# Patient Record
Sex: MALE | Race: WHITE | NOT HISPANIC OR LATINO | Employment: FULL TIME | URBAN - NONMETROPOLITAN AREA
[De-identification: names, ages, dates, MRNs, and addresses within clinical notes are randomized per-mention and may not be internally consistent; named-entity substitution may affect disease eponyms.]

---

## 2017-01-09 ENCOUNTER — OFFICE VISIT (OUTPATIENT)
Dept: CHIROPRACTIC MEDICINE | Facility: OTHER | Age: 42
End: 2017-01-09
Attending: NURSE PRACTITIONER
Payer: COMMERCIAL

## 2017-01-09 DIAGNOSIS — M99.03 SEGMENTAL AND SOMATIC DYSFUNCTION OF LUMBAR REGION: Primary | ICD-10-CM

## 2017-01-09 DIAGNOSIS — M99.02 SEGMENTAL AND SOMATIC DYSFUNCTION OF THORACIC REGION: ICD-10-CM

## 2017-01-09 DIAGNOSIS — M54.50 ACUTE BILATERAL LOW BACK PAIN WITHOUT SCIATICA: ICD-10-CM

## 2017-01-09 DIAGNOSIS — M99.01 SEGMENTAL AND SOMATIC DYSFUNCTION OF CERVICAL REGION: ICD-10-CM

## 2017-01-09 PROCEDURE — 98941 CHIROPRACT MANJ 3-4 REGIONS: CPT | Performed by: CHIROPRACTOR

## 2017-01-11 NOTE — PROGRESS NOTES
Subjective Finding:    Chief compalint: Patient presents with:  Back Pain  Neck Pain  , Pain Scale: 5/10, Intensity: dull and ache, Duration: 2 weeks, Radiating: no.    Date of injury:     Activities that the pain restricts:   Home/household/hobbies/social activities: yes.  Work duties: no.  Sleep: no.  Makes symptoms better: rest.  Makes symptoms worse: activity and lumbar flexion.  Have you seen anyone else for the symptoms? No.  Work related: no.  Automobile related injury: no.    Objective and Assessment:    Posture Analysis:   High shoulder: .  Head tilt: .  High iliac crest: .  Head carriage: neutral.  Thoracic Kyphosis: neutral.  Lumbar Lordosis: forward.    Lumbar Range of Motion: extension decreased.  Cervical Range of Motion: .  Thoracic Range of Motion: .  Extremity Range of Motion: .    Palpation:   Quad lumb: bilateral, referred pain: no    Segmental dysfunction pre-treatment and treatment area: C3, T6, T7, L3, L4 and L5.    Assessment post-treatment:  Cervical: .  Thoracic: ROM increased.  Lumbar: ROM increased.    Comments: .      Complicating Factors: .    Plan / Procedure:    Treatment plan: PRN.  Instructed patient: stretch as instructed at visit.  Short term goals: reduce pain and increase ROM.  Long term goals: restore normal function.  Prognosis: excellent.

## 2017-01-16 ENCOUNTER — APPOINTMENT (OUTPATIENT)
Dept: OCCUPATIONAL MEDICINE | Facility: OTHER | Age: 42
End: 2017-01-16

## 2017-03-03 DIAGNOSIS — M54.2 NECK PAIN: ICD-10-CM

## 2017-03-06 RX ORDER — HYDROCODONE BITARTRATE AND ACETAMINOPHEN 5; 325 MG/1; MG/1
TABLET ORAL
Qty: 60 TABLET | Refills: 0 | Status: SHIPPED | OUTPATIENT
Start: 2017-03-06 | End: 2017-07-31

## 2017-03-07 DIAGNOSIS — M54.2 NECK PAIN: ICD-10-CM

## 2017-03-07 NOTE — TELEPHONE ENCOUNTER
Patient would like Rx sent via  to the [facility] then walked to [facility]

## 2017-03-08 RX ORDER — HYDROCODONE BITARTRATE AND ACETAMINOPHEN 5; 325 MG/1; MG/1
TABLET ORAL
OUTPATIENT
Start: 2017-03-08

## 2017-04-07 ENCOUNTER — HOSPITAL ENCOUNTER (EMERGENCY)
Facility: HOSPITAL | Age: 42
Discharge: HOME | End: 2017-04-07
Attending: NURSE PRACTITIONER | Admitting: NURSE PRACTITIONER
Payer: COMMERCIAL

## 2017-04-07 VITALS
TEMPERATURE: 96.6 F | DIASTOLIC BLOOD PRESSURE: 80 MMHG | SYSTOLIC BLOOD PRESSURE: 131 MMHG | RESPIRATION RATE: 16 BRPM | OXYGEN SATURATION: 99 %

## 2017-04-07 DIAGNOSIS — J22 LOWER RESPIRATORY INFECTION (E.G., BRONCHITIS, PNEUMONIA, PNEUMONITIS, PULMONITIS): ICD-10-CM

## 2017-04-07 PROCEDURE — 99213 OFFICE O/P EST LOW 20 MIN: CPT | Mod: 25

## 2017-04-07 PROCEDURE — 99213 OFFICE O/P EST LOW 20 MIN: CPT | Performed by: NURSE PRACTITIONER

## 2017-04-07 PROCEDURE — 71020 ZZHC CHEST TWO VIEWS, FRONT/LAT: CPT | Mod: TC

## 2017-04-07 RX ORDER — PREDNISONE 20 MG/1
TABLET ORAL
Qty: 10 TABLET | Refills: 0 | Status: SHIPPED | OUTPATIENT
Start: 2017-04-07 | End: 2017-05-17

## 2017-04-07 RX ORDER — ALBUTEROL SULFATE 90 UG/1
2 AEROSOL, METERED RESPIRATORY (INHALATION) EVERY 4 HOURS PRN
Qty: 1 INHALER | Refills: 0 | Status: SHIPPED | OUTPATIENT
Start: 2017-04-07 | End: 2018-11-20

## 2017-04-07 RX ORDER — CODEINE PHOSPHATE AND GUAIFENESIN 10; 100 MG/5ML; MG/5ML
1-2 SOLUTION ORAL EVERY 4 HOURS PRN
Qty: 180 ML | Refills: 0 | Status: SHIPPED | OUTPATIENT
Start: 2017-04-07 | End: 2017-05-17

## 2017-04-07 RX ORDER — AZITHROMYCIN 250 MG/1
TABLET, FILM COATED ORAL
Qty: 6 TABLET | Refills: 0 | Status: SHIPPED | OUTPATIENT
Start: 2017-04-07 | End: 2017-04-12

## 2017-04-07 RX ORDER — BENZONATATE 200 MG/1
200 CAPSULE ORAL 3 TIMES DAILY PRN
Qty: 40 CAPSULE | Refills: 0 | Status: SHIPPED | OUTPATIENT
Start: 2017-04-07 | End: 2017-05-17

## 2017-04-07 ASSESSMENT — ENCOUNTER SYMPTOMS
CHILLS: 1
COUGH: 1
WHEEZING: 0
NAUSEA: 0
SINUS PRESSURE: 1
VOMITING: 0
APPETITE CHANGE: 0
FEVER: 1
FATIGUE: 1
DIARRHEA: 0
ABDOMINAL PAIN: 0
SHORTNESS OF BREATH: 1
SORE THROAT: 1
ARTHRALGIAS: 1
MYALGIAS: 1

## 2017-04-07 NOTE — ED PROVIDER NOTES
History     Chief Complaint   Patient presents with     URI     c/o cough and cold symptoms x 2 weeks     HPI  Jese Joe is a 42 year old male who presents today with a CC of cough, sore throat from coughing, mild shortness of breath, chills, sweats x 2+ weeks.  He notes that it started as a head cold and it has moved down to a dry hacking cough in his chest.  He has been using OTC cough medicine, elder berry, fisherman's friends.  He has been pushing fluids.  He was exposed to his family who was ill with the same symptoms.  His children were just treated with Azithromycin with improvement.      I have reviewed the Medications, Allergies, Past Medical and Surgical History, and Social History in the Epic system.    Review of Systems   Constitutional: Positive for chills, fatigue and fever. Negative for appetite change.   HENT: Positive for congestion, sinus pressure and sore throat. Negative for ear pain.    Respiratory: Positive for cough and shortness of breath. Negative for wheezing.    Gastrointestinal: Negative for abdominal pain, diarrhea, nausea and vomiting.   Musculoskeletal: Positive for arthralgias and myalgias.       Physical Exam   BP: 131/80  Heart Rate: 70  Temp: 96.6  F (35.9  C)  Resp: 16  SpO2: 99 %    Physical Exam   Constitutional: He is oriented to person, place, and time. He appears well-developed and well-nourished. He is cooperative.   HENT:   Head: Normocephalic and atraumatic.   Right Ear: Tympanic membrane, external ear and ear canal normal.   Left Ear: Tympanic membrane, external ear and ear canal normal.   Mouth/Throat: Uvula is midline. Posterior oropharyngeal edema and posterior oropharyngeal erythema present.   Eyes: Conjunctivae are normal.   Neck: Normal range of motion. Neck supple.   Cardiovascular: Normal rate and regular rhythm.    Pulmonary/Chest: Effort normal. He has wheezes.   Frequent harsh cough noted in exam room   Musculoskeletal: Normal range of motion.    Neurological: He is alert and oriented to person, place, and time.   Skin: Skin is warm and dry.   Psychiatric: He has a normal mood and affect. His behavior is normal.   Nursing note and vitals reviewed.      ED Course     ED Course     Procedures      Assessments & Plan (with Medical Decision Making)     I have reviewed the nursing notes.    I have reviewed the findings, diagnosis, plan and need for follow up with the patient.  ASSESSMENT / PLAN:  (J22) Lower respiratory infection (e.g., bronchitis, pneumonia, pneumonitis, pulmonitis)  Comment: symptomatic x 2+ weeks, symptoms continue to worsen  Plan:  Patient verbally educated and given appropriate education sheets for each of their diagnoses and has no questions.   Symptomatic treatments such as those listed below are recommended as needed:   Take OTC motrin or tylenol as directed on the bottle as needed.   Cool mist humidifier at bedside    May try safely elevating HOB or sleeping in recliner   Take OTC cold medicine as directed on bottle - check ingredients, many are multi symptom and may contain tylenol or motrin   Frequent sips of non-caffeine fluids, rest, wash hands often   Sinus rinses such as a netti pot may help with sinus symptoms   Return to ED/UC if symptoms worsen or concerns develop: shortness of breath,     chest pain, unable to control fever < 103 with medications, persistent vomiting, signs/symptoms of dehydration.   If symptoms persist follow up with PCP for re-evaluation.        Discharge Medication List as of 4/7/2017 12:33 PM      START taking these medications    Details   predniSONE (DELTASONE) 20 MG tablet Take two tablets (= 40mg) each day for 5 (five) days, Disp-10 tablet, R-0, E-Prescribe      guaiFENesin-codeine (ROBITUSSIN AC) 100-10 MG/5ML SOLN solution Take 5-10 mLs by mouth every 4 hours as needed for cough, Disp-180 mL, R-0, Local Print      azithromycin (ZITHROMAX Z-DENISE) 250 MG tablet Two tablets on the first day, then one  tablet daily for the next 4 days, Disp-6 tablet, R-0, E-Prescribe      albuterol (PROAIR HFA/PROVENTIL HFA/VENTOLIN HFA) 108 (90 BASE) MCG/ACT Inhaler Inhale 2 puffs into the lungs every 4 hours as needed for shortness of breath / dyspnea or wheezing, Disp-1 Inhaler, R-0, E-Prescribe      benzonatate (TESSALON) 200 MG capsule Take 1 capsule (200 mg) by mouth 3 times daily as needed for cough, Disp-40 capsule, R-0, E-Prescribe             Final diagnoses:   Lower respiratory infection (e.g., bronchitis, pneumonia, pneumonitis, pulmonitis)       4/7/2017   [facility]     Val Mcgee NP  04/09/17 3238

## 2017-04-07 NOTE — ED AVS SNAPSHOT
[facility]    [address] [address]    Phone:  [phone]                                       Jese Joe   MRN: [id number]    Department:  [facility]   Date of Visit:  4/7/2017           Patient Information     Date Of Birth          1975        Your diagnoses for this visit were:     Lower respiratory infection (e.g., bronchitis, pneumonia, pneumonitis, pulmonitis)        You were seen by Val Mcgee NP.      Follow-up Information     Follow up with [facility].    Specialty:  EMERGENCY MEDICINE    Why:  As needed, If symptoms worsen, or concerns develop    Contact information:    [address] [address] [phone]    Additional information:    From [facility]: Take [address]. Turn left at [address]. Turn left at the first stoplight on [address]. At the first stop sign, take a right onto [address]. Take a left into the parking lot and continue through until you reach the North enterance of the building.       From [place]: Take [address]. Take the [address] ramp towards Nursery. Turn left onto [address]. Take a slight right onto [address]. Turn left at the first stoplight on [address]. At the first stop sign, take a right onto [address]. Take a left into the parking lot and continue through until you reach the North enterance of the building.       From [place]: Take [address]. Take a right at [address]. At the first stop sign, take a right onto [address]. Take a left into the parking lot and continue through until you reach the North enterance of the building.         Follow up with Andrei Philip MD.    Specialty:  Family Practice    Why:  As needed, if symptoms do not improve    Contact information:    JENNIE VÁZQUEZ [facility]  [address]  [facility] [address]  [phone]        Discharge  References/Attachments     BRONCHITIS, ACUTE (ENGLISH)         Review of your medicines      START taking        Dose / Directions Last dose taken    albuterol 108 (90 BASE) MCG/ACT Inhaler   Commonly known as:  PROAIR HFA/PROVENTIL HFA/VENTOLIN HFA   Dose:  2 puff   Quantity:  1 Inhaler        Inhale 2 puffs into the lungs every 4 hours as needed for shortness of breath / dyspnea or wheezing   Refills:  0        azithromycin 250 MG tablet   Commonly known as:  ZITHROMAX Z-DENISE   Quantity:  6 tablet        Two tablets on the first day, then one tablet daily for the next 4 days   Refills:  0        benzonatate 200 MG capsule   Commonly known as:  TESSALON   Dose:  200 mg   Quantity:  40 capsule        Take 1 capsule (200 mg) by mouth 3 times daily as needed for cough   Refills:  0        guaiFENesin-codeine 100-10 MG/5ML Soln solution   Commonly known as:  ROBITUSSIN AC   Dose:  1-2 tsp.   Quantity:  180 mL        Take 5-10 mLs by mouth every 4 hours as needed for cough   Refills:  0        predniSONE 20 MG tablet   Commonly known as:  DELTASONE   Quantity:  10 tablet        Take two tablets (= 40mg) each day for 5 (five) days   Refills:  0          Our records show that you are taking the medicines listed below. If these are incorrect, please call your family doctor or clinic.        Dose / Directions Last dose taken    HYDROcodone-acetaminophen 5-325 MG per tablet   Commonly known as:  NORCO   Quantity:  60 tablet        TAKE 1 TABLET BY MOUTH EVERY 4-6 HOURS AS NEEDED FOR PAIN   Refills:  0        IBUPROFEN PO   Dose:  800 mg        Take 800 mg by mouth   Refills:  0        ondansetron 4 MG ODT tab   Commonly known as:  ZOFRAN-ODT   Quantity:  20 tablet        DISSOLVE 1 TABLET IN MOUTH EVERY 6 HOURS AS NEEDED FOR NAUSEA   Refills:  0        TYLENOL PO   Dose:  1000 mg        Take 1,000 mg by mouth   Refills:  0                Prescriptions were sent or printed at these locations (5 Prescriptions)                  "  [id number] - [place], [place] - [address] AT [facility] [address]   [address], [place] [address]    Telephone:  [phone]   Fax:  [phone]   Hours:                  E-Prescribed (4 of 5)         predniSONE (DELTASONE) 20 MG tablet               azithromycin (ZITHROMAX Z-DENISE) 250 MG tablet               albuterol (PROAIR HFA/PROVENTIL HFA/VENTOLIN HFA) 108 (90 BASE) MCG/ACT Inhaler               benzonatate (TESSALON) 200 MG capsule                 Printed at Department/Unit printer (1 of 5)         guaiFENesin-codeine (ROBITUSSIN AC) 100-10 MG/5ML SOLN solution                Procedures and tests performed during your visit     Chest XR,  PA & LAT      Orders Needing Specimen Collection     None      Pending Results     Date and Time Order Name Status Description    2017 1158 Chest XR,  PA & LAT In process             Pending Culture Results     No orders found from 2017 to 2017.            Thank you for choosing [facility]       Thank you for choosing [facility] for your care. Our goal is always to provide you with excellent care. Hearing back from our patients is one way we can continue to improve our services. Please take a few minutes to complete the written survey that you may receive in the mail after you visit with us. Thank you!        [employer] Information     [employer] lets you send messages to your doctor, view your test results, renew your prescriptions, schedule appointments and more. To sign up, go to [email or web address] . Click on \"Log in\" on the left side of the screen, which will take you to the Welcome page. Then click on \"Sign up Now\" on the right side of the page.     You will be asked to enter the access code listed below, as well as some personal information. Please follow the directions to create your username and password.     Your access code is: [email or web address]  Expires: 2017 12:31 PM     Your access code will  in 90 days. If you need help " or a new code, please call your [facility] or [phone].        Care EveryWhere ID     This is your Care EveryWhere ID. This could be used by other organizations to access your [facility] medical records  [id number]        After Visit Summary       This is your record. Keep this with you and show to your community pharmacist(s) and doctor(s) at your next visit.

## 2017-04-07 NOTE — ED AVS SNAPSHOT
[facility]    [address] [address]    Phone:  [phone]                                       Jese Joe   MRN: [id number]    Department:  [facility]   Date of Visit:  4/7/2017           After Visit Summary Signature Page     I have received my discharge instructions, and my questions have been answered. I have discussed any challenges I see with this plan with the nurse or doctor.    ..........................................................................................................................................  Patient/Patient Representative Signature      ..........................................................................................................................................  Patient Representative Print Name and Relationship to Patient    ..................................................               ................................................  Date                                            Time    ..........................................................................................................................................  Reviewed by Signature/Title    ...................................................              ..............................................  Date                                                            Time

## 2017-04-07 NOTE — ED NOTES
Patient presents with complaints of URI; cough/cold symptoms x 2 weeks.  States now it feels like it is in his chest; voice is hoarse, cough noted; mask given.

## 2017-04-07 NOTE — ED NOTES
Pt presents today alone for c/o a cough and congestion for about 2 weeks that keeps getting worse and says he feels it's moved it his chest.

## 2017-05-17 ENCOUNTER — HOSPITAL ENCOUNTER (EMERGENCY)
Facility: HOSPITAL | Age: 42
Discharge: HOME | End: 2017-05-17
Attending: NURSE PRACTITIONER | Admitting: NURSE PRACTITIONER
Payer: COMMERCIAL

## 2017-05-17 VITALS
RESPIRATION RATE: 18 BRPM | DIASTOLIC BLOOD PRESSURE: 94 MMHG | TEMPERATURE: 96.4 F | SYSTOLIC BLOOD PRESSURE: 139 MMHG | OXYGEN SATURATION: 98 %

## 2017-05-17 DIAGNOSIS — S61.412A LACERATION OF HAND, LEFT, INITIAL ENCOUNTER: ICD-10-CM

## 2017-05-17 PROCEDURE — 12001 RPR S/N/AX/GEN/TRNK 2.5CM/<: CPT | Performed by: NURSE PRACTITIONER

## 2017-05-17 PROCEDURE — 90715 TDAP VACCINE 7 YRS/> IM: CPT | Performed by: PHYSICIAN ASSISTANT

## 2017-05-17 PROCEDURE — 12001 RPR S/N/AX/GEN/TRNK 2.5CM/<: CPT

## 2017-05-17 PROCEDURE — 40000268 ZZH STATISTIC NO CHARGES

## 2017-05-17 PROCEDURE — 25000125 ZZHC RX 250: Performed by: PHYSICIAN ASSISTANT

## 2017-05-17 PROCEDURE — 90471 IMMUNIZATION ADMIN: CPT

## 2017-05-17 RX ORDER — CEPHALEXIN 500 MG/1
500 CAPSULE ORAL 4 TIMES DAILY
Qty: 28 CAPSULE | Refills: 0 | Status: SHIPPED | OUTPATIENT
Start: 2017-05-17 | End: 2017-05-24

## 2017-05-17 RX ADMIN — CLOSTRIDIUM TETANI TOXOID ANTIGEN (FORMALDEHYDE INACTIVATED), CORYNEBACTERIUM DIPHTHERIAE TOXOID ANTIGEN (FORMALDEHYDE INACTIVATED), BORDETELLA PERTUSSIS TOXOID ANTIGEN (GLUTARALDEHYDE INACTIVATED), BORDETELLA PERTUSSIS FILAMENTOUS HEMAGGLUTININ ANTIGEN (FORMALDEHYDE INACTIVATED), BORDETELLA PERTUSSIS PERTACTIN ANTIGEN, AND BORDETELLA PERTUSSIS FIMBRIAE 2/3 ANTIGEN 0.5 ML: 5; 2; 2.5; 5; 3; 5 INJECTION, SUSPENSION INTRAMUSCULAR at 11:38

## 2017-05-17 ASSESSMENT — ENCOUNTER SYMPTOMS
CONSTITUTIONAL NEGATIVE: 1
WOUND: 1

## 2017-05-17 NOTE — ED PROVIDER NOTES
History     Chief Complaint   Patient presents with     Laceration     lt hand lac     Patient is a 42 year old male presenting with skin laceration. The history is provided by the patient. No  was used.   Laceration   Location:  Hand  Hand laceration location:  L hand  Depth:  Through dermis  Quality: straight    Bleeding: controlled    Injury mechanism: Elastic cord.  Pain details:     Severity:  Mild  Foreign body present:  No foreign bodies  Tetanus status:  Out of date    I have reviewed the Medications, Allergies, Past Medical and Surgical History, and Social History in the Epic system.    Review of Systems   Constitutional: Negative.    Musculoskeletal:        AFROM left hand and fingers   Skin: Positive for wound.       Physical Exam   BP: 139/94  Heart Rate: 69  Temp: 96.4  F (35.8  C)  Resp: 18  SpO2: 98 %  Physical Exam   Constitutional: He is oriented to person, place, and time. He appears well-developed and well-nourished. No distress.   HENT:   Head: Normocephalic and atraumatic.   Eyes: Conjunctivae are normal. No scleral icterus.   Neck: Normal range of motion.   Cardiovascular: Normal rate.    Pulmonary/Chest: Effort normal.   Musculoskeletal:        Left hand: He exhibits tenderness and laceration. He exhibits no swelling. Normal sensation noted. Normal strength noted.        Hands:  Neurological: He is alert and oriented to person, place, and time.   Skin: He is not diaphoretic.   Psychiatric: He has a normal mood and affect. His behavior is normal.   Nursing note and vitals reviewed.      ED Course     Medications   Tdap (tetanus-diphtheria-acell pertussis) (ADACEL) injection 0.5 mL (0.5 mLs Intramuscular Given 5/17/17 1138)     ED Course     Laceration repair  Date/Time: 5/17/2017 11:40 AM  Performed by: FEI ALCAZAR  Authorized by: FEI ALCAZAR   Consent: Verbal consent obtained.  Risks and benefits: risks, benefits and alternatives were  discussed  Consent given by: patient  Patient understanding: patient states understanding of the procedure being performed  Patient identity confirmed: verbally with patient and arm band  Body area: upper extremity  Location details: left hand  Laceration length: 2.5 cm  Foreign bodies: no foreign bodies  Tendon involvement: none  Nerve involvement: none  Anesthesia: local infiltration    Anesthesia:  Anesthesia: local infiltration  Local Anesthetic: lidocaine 1% with epinephrine   Anesthetic total: 3 mL  Sedation:  Patient sedated: no    Preparation: Patient was prepped and draped in the usual sterile fashion.  Skin closure: 4-0 nylon  Technique: simple  Approximation: close  Approximation difficulty: simple  Dressing: large band-aid, secured with coban.  Patient tolerance: Patient tolerated the procedure well with no immediate complications        Assessments & Plan (with Medical Decision Making)     I have reviewed the nursing notes.  I have reviewed the findings, diagnosis, plan and need for follow up with the patient.  Wound care instructions given.   Plan: Keep clean and dry x 24 hours, then may shower as usual. Do not soak or swim  Take Tylenol per package directions for pain  Call tomorrow to make a follow up appointment for suture removal in 7 days  Watch for signs and symptoms of infection: Increasing redness, pain, warmth, fever and follow up here or with PCP if any arise  Due to location on hand, given written script for Keflex to start abx if redness, swelling or drainage develop.     Discharge Medication List as of 5/17/2017 12:03 PM      START taking these medications    Details   cephALEXin (KEFLEX) 500 MG capsule Take 1 capsule (500 mg) by mouth 4 times daily for 7 days, Disp-28 capsule, R-0, Local Print             Final diagnoses:   Laceration of hand, left, initial encounter       5/17/2017   [facility]     Ryanne Philip NP  05/17/17 2988

## 2017-05-17 NOTE — ED AVS SNAPSHOT
[facility]    [address] [address]    Phone:  [phone]                                       Jese Joe   MRN: [id number]    Department[facility]  [facility]   Date of Visit:  5/17/2017           After Visit Summary Signature Page     I have received my discharge instructions, and my questions have been answered. I have discussed any challenges I see with this plan with the nurse or doctor.    ..........................................................................................................................................  Patient/Patient Representative Signature      ..........................................................................................................................................  Patient Representative Print Name and Relationship to Patient    ..................................................               ................................................  Date                                            Time    ..........................................................................................................................................  Reviewed by Signature/Title    ...................................................              ..............................................  Date                                                            Time

## 2017-05-17 NOTE — ED AVS SNAPSHOT
[facility]    [address]    [facility] [address]    Phone:  [phone]                                       Jese Joe   MRN: [id number]    Department:  [facility]   Date of Visit:  5/17/2017           Patient Information     Date Of Birth          1975        Your diagnoses for this visit were:     Laceration of hand, left, initial encounter        You were seen by Ryanne Philip NP.      Follow-up Information     Follow up with Andrei Philip MD In 1 week.    Specialty:  Family Practice    Why:   For suture removal    Contact information:     [facility]  [address] [facility] [address] [phone]          Follow up with [facility].    Specialty:  EMERGENCY MEDICINE    Why:  If symptoms worsen    Contact information:    [address]  [facility] [address] [phone]    Additional information:    From [place] Area: Take [address]. Turn left at [address]. Turn left at the first stoplight on [address]. At the first stop sign, take a right onto [address]. Take a left into the parking lot and continue through until you reach the North enterance of the building.       From [place]: Take [address]. Take the [address] ramp towards [place]. Turn left onto [address]. Take a slight right onto [address]. Turn left at the first stoplight on [address]. At the first stop sign, take a right onto [address]. Take a left into the parking lot and continue through until you reach the North enterance of the building.       From [place]: Take [address]. Take a right at [address]. At the first stop sign, take a right onto [address]. Take a left into the parking lot and continue through until you reach the North enterance of the building.         Discharge Instructions         Keep hand clean and dry. For 2-3 days, apply thin layer of  abx ointment around the laceration, and cover with a bandage.   Avoid heavy lifting and straining the incision.   You had 6 sutures placed today, they will need to come out in 7-10 days.   Monitor for infection, follow up if concerns develop.       Hand Laceration: All Closures  A laceration is a cut through the skin. You have a cut on the hand. Deep cuts usually require stitches (sutures) or staples. Minor cuts may be closed with surgical tape or skin adhesive.        Home care    Your healthcare provider may prescribe an antibiotic. This is to help prevent infection. Follow all instructions for taking this medicine. Take the medicine every day until it is gone or you are told to stop. You should not have any left over.    The healthcare provider may prescribe medicines for pain. Follow instructions for taking them.    Follow the healthcare provider s instructions on how to care for the cut.    Keep the wound clean and dry. Do not get the wound wet until you are told it is okay to do so. If the bandage gets wet, remove it. Gently pat the wound dry with a clean cloth. Then put on a clean, dry bandage..    To help prevent infection, wash your hands with soap and water before and after caring for the wound.     Caring for sutures or staples: Once you no longer need to keep them dry, clean the wound daily. First, remove the bandage. Then wash the area gently with soap and warm water, or as directed by the health care provider. Use a wet cotton swab to loosen and remove any blood or crust that forms. After cleaning, apply a thin layer of antibiotic ointment if advised. Then put on a new bandage unless you are told not to.    Caring for skin glue: Don t put apply liquid, ointment, or cream on the wound while the glue is in place. Avoid activities that cause heavy sweating. Protect the wound from sunlight. Do not scratch, rub, or pick at the adhesive film. Do not place tape directly over the film. The glue should peel off  within 5 to 10 days.     Caring for surgical tape: Keep the area dry. If it gets wet, blot it dry with a clean towel. Surgical tape usually falls off within 7 to 10 days. If it has not fallen off after 10 days, you can take it off yourself. Put mineral oil or petroleum jelly on a cotton ball and gently rub the tape until it is removed.    Once you can get the wound wet, you may shower as usual but do not soak the wound in water (no tub baths or swimming)    Even with proper treatment, a wound infection may sometimes occur. Check the wound daily for signs of infection listed below.  Follow-up care  Follow up with your healthcare provider as advised. If you have stitches or staples, be sure to return as directed to have them removed.  When to seek medical advice  Call your healthcare provider right away if any of these occur:    Wound bleeding not controlled by direct pressure    Signs of infection, including increasing pain in the wound, increasing wound redness or swelling, or pus or bad odor coming from the wound    Fever of 100.4 F (38. C) or as directed by your health care provider    Stitches or staples come apart or fall out or surgical tape falls off before 7 days    Wound edges re-open    Wound changes colors    Numbness or weakness in the affected hand     Decreased movement of the hand    2280-2747 [employer]. [address] [place] [address]. All rights reserved. This information is not intended as a substitute for professional medical care. Always follow your healthcare professional's instructions.          Future Appointments        Provider Department Dept Phone Center    5/18/2017 1:15 PM Andrei Philip MD [facility] [phone] [place] [facility]         Review of your medicines      START taking        Dose / Directions Last dose taken    cephALEXin 500 MG capsule   Commonly known as:  KEFLEX   Dose:  500 mg   Quantity:  28 capsule        Take 1 capsule (500 mg)  by mouth 4 times daily for 7 days   Refills:  0          Our records show that you are taking the medicines listed below. If these are incorrect, please call your family doctor or clinic.        Dose / Directions Last dose taken    albuterol 108 (90 BASE) MCG/ACT Inhaler   Commonly known as:  PROAIR HFA/PROVENTIL HFA/VENTOLIN HFA   Dose:  2 puff   Quantity:  1 Inhaler        Inhale 2 puffs into the lungs every 4 hours as needed for shortness of breath / dyspnea or wheezing   Refills:  0        HYDROcodone-acetaminophen 5-325 MG per tablet   Commonly known as:  NORCO   Quantity:  60 tablet        TAKE 1 TABLET BY MOUTH EVERY 4-6 HOURS AS NEEDED FOR PAIN   Refills:  0        IBUPROFEN PO   Dose:  800 mg        Take 800 mg by mouth   Refills:  0        ondansetron 4 MG ODT tab   Commonly known as:  ZOFRAN-ODT   Quantity:  20 tablet        DISSOLVE 1 TABLET IN MOUTH EVERY 6 HOURS AS NEEDED FOR NAUSEA   Refills:  0        TYLENOL PO   Dose:  1000 mg        Take 1,000 mg by mouth   Refills:  0                Prescriptions were sent or printed at these locations (1 Prescription)                   Other Prescriptions                Printed at Department/Unit printer (1 of 1)         cephALEXin (KEFLEX) 500 MG capsule                Orders Needing Specimen Collection     None      Pending Results     No orders found from 5/15/2017 to 5/18/2017.            Pending Culture Results     No orders found from 5/15/2017 to 5/18/2017.            Thank you for choosing [facility]       Thank you for choosing [facility] for your care. Our goal is always to provide you with excellent care. Hearing back from our patients is one way we can continue to improve our services. Please take a few minutes to complete the written survey that you may receive in the mail after you visit with us. Thank you!        [employer] Information     [employer] lets you send messages to your doctor, view your test results, renew your prescriptions, schedule  "appointments and more. To sign up, go to [email or web address] . Click on \"Log in\" on the left side of the screen, which will take you to the Welcome page. Then click on \"Sign up Now\" on the right side of the page.     You will be asked to enter the access code listed below, as well as some personal information. Please follow the directions to create your username and password.     Your access code is: [email or web address]  Expires: 2017 12:31 PM     Your access code will  in 90 days. If you need help or a new code, please call your [facility] clinic or [phone].        Care EveryWhere ID     This is your Care EveryWhere ID. This could be used by other organizations to access your [facility] medical records  [id number]        After Visit Summary       This is your record. Keep this with you and show to your community pharmacist(s) and doctor(s) at your next visit.                  "

## 2017-05-17 NOTE — DISCHARGE INSTRUCTIONS
Keep hand clean and dry. For 2-3 days, apply thin layer of abx ointment around the laceration, and cover with a bandage.   Avoid heavy lifting and straining the incision.   You had 6 sutures placed today, they will need to come out in 7-10 days.   Monitor for infection, follow up if concerns develop.       Hand Laceration: All Closures  A laceration is a cut through the skin. You have a cut on the hand. Deep cuts usually require stitches (sutures) or staples. Minor cuts may be closed with surgical tape or skin adhesive.        Home care    Your healthcare provider may prescribe an antibiotic. This is to help prevent infection. Follow all instructions for taking this medicine. Take the medicine every day until it is gone or you are told to stop. You should not have any left over.    The healthcare provider may prescribe medicines for pain. Follow instructions for taking them.    Follow the healthcare provider s instructions on how to care for the cut.    Keep the wound clean and dry. Do not get the wound wet until you are told it is okay to do so. If the bandage gets wet, remove it. Gently pat the wound dry with a clean cloth. Then put on a clean, dry bandage..    To help prevent infection, wash your hands with soap and water before and after caring for the wound.     Caring for sutures or staples: Once you no longer need to keep them dry, clean the wound daily. First, remove the bandage. Then wash the area gently with soap and warm water, or as directed by the health care provider. Use a wet cotton swab to loosen and remove any blood or crust that forms. After cleaning, apply a thin layer of antibiotic ointment if advised. Then put on a new bandage unless you are told not to.    Caring for skin glue: Don t put apply liquid, ointment, or cream on the wound while the glue is in place. Avoid activities that cause heavy sweating. Protect the wound from sunlight. Do not scratch, rub, or pick at the adhesive film. Do  not place tape directly over the film. The glue should peel off within 5 to 10 days.     Caring for surgical tape: Keep the area dry. If it gets wet, blot it dry with a clean towel. Surgical tape usually falls off within 7 to 10 days. If it has not fallen off after 10 days, you can take it off yourself. Put mineral oil or petroleum jelly on a cotton ball and gently rub the tape until it is removed.    Once you can get the wound wet, you may shower as usual but do not soak the wound in water (no tub baths or swimming)    Even with proper treatment, a wound infection may sometimes occur. Check the wound daily for signs of infection listed below.  Follow-up care  Follow up with your healthcare provider as advised. If you have stitches or staples, be sure to return as directed to have them removed.  When to seek medical advice  Call your healthcare provider right away if any of these occur:    Wound bleeding not controlled by direct pressure    Signs of infection, including increasing pain in the wound, increasing wound redness or swelling, or pus or bad odor coming from the wound    Fever of 100.4 F (38. C) or as directed by your health care provider    Stitches or staples come apart or fall out or surgical tape falls off before 7 days    Wound edges re-open    Wound changes colors    Numbness or weakness in the affected hand     Decreased movement of the hand    7636-4918 [employer]. [address] [place] [address]. All rights reserved. This information is not intended as a substitute for professional medical care. Always follow your healthcare professional's instructions.

## 2017-05-24 ENCOUNTER — HOSPITAL ENCOUNTER (EMERGENCY)
Facility: HOSPITAL | Age: 42
Discharge: HOME | End: 2017-05-24
Payer: COMMERCIAL

## 2017-05-24 VITALS
SYSTOLIC BLOOD PRESSURE: 135 MMHG | DIASTOLIC BLOOD PRESSURE: 79 MMHG | HEART RATE: 75 BPM | TEMPERATURE: 97.9 F | OXYGEN SATURATION: 98 % | RESPIRATION RATE: 16 BRPM

## 2017-05-24 DIAGNOSIS — T81.31XA WOUND DEHISCENCE, INITIAL ENCOUNTER: ICD-10-CM

## 2017-05-24 PROCEDURE — 99213 OFFICE O/P EST LOW 20 MIN: CPT

## 2017-05-24 PROCEDURE — 99212 OFFICE O/P EST SF 10 MIN: CPT

## 2017-05-24 NOTE — ED AVS SNAPSHOT
[facility]    [address] [address]    Phone:  [phone]                                       Jese Joe   MRN: [id number]    Department:  [facility]   Date of Visit:  5/24/2017           Patient Information     Date Of Birth          1975        Your diagnoses for this visit were:     Wound dehiscence, initial encounter        You were seen by Kellee Devries APRN FNP.      Follow-up Information     Follow up with Andrei Philip MD In 1 week.    Specialty:  Family Practice    Why:  if not improving or back to baseline    Contact information:    [facility]  [address] [place] [address] [phone]          Follow up with [facility].    Specialty:  EMERGENCY MEDICINE    Why:  As needed, If symptoms worsen    Contact information:    [address] [address] [phone]    Additional information:    From [facility]: Take [address]. Turn left at [address]. Turn left at the first stoplight on [address]. At the first stop sign, take a right onto [address]. Take a left into the parking lot and continue through until you reach the North enterance of the building.       From [place]: Take [address]. Take the [address] ramp towards [place]. Turn left onto [address]. Take a slight right onto [address]. Turn left at the first stoplight on [address]. At the first stop sign, take a right onto [address]. Take a left into the parking lot and continue through until you reach the North enterance of the building.       From [place]: Take [address]. Take a right at [address]. At the first stop sign, take a right onto [address]. Take a left into the parking lot and continue through until you reach the North enterance of the building.         Discharge Instructions       Keep wound clean and dry  Cover with  bacitracin oint and coban - change dressing daily  Thumb spica until wound has healed  Take out remaining stitches in 5 days  Return to  as needed         Review of your medicines      Our records show that you are taking the medicines listed below. If these are incorrect, please call your family doctor or clinic.        Dose / Directions Last dose taken    albuterol 108 (90 BASE) MCG/ACT Inhaler   Commonly known as:  PROAIR HFA/PROVENTIL HFA/VENTOLIN HFA   Dose:  2 puff   Quantity:  1 Inhaler        Inhale 2 puffs into the lungs every 4 hours as needed for shortness of breath / dyspnea or wheezing   Refills:  0        cephALEXin 500 MG capsule   Commonly known as:  KEFLEX   Dose:  500 mg   Quantity:  28 capsule        Take 1 capsule (500 mg) by mouth 4 times daily for 7 days   Refills:  0        HYDROcodone-acetaminophen 5-325 MG per tablet   Commonly known as:  NORCO   Quantity:  60 tablet        TAKE 1 TABLET BY MOUTH EVERY 4-6 HOURS AS NEEDED FOR PAIN   Refills:  0        IBUPROFEN PO   Dose:  800 mg        Take 800 mg by mouth   Refills:  0        ondansetron 4 MG ODT tab   Commonly known as:  ZOFRAN-ODT   Quantity:  20 tablet        DISSOLVE 1 TABLET IN MOUTH EVERY 6 HOURS AS NEEDED FOR NAUSEA   Refills:  0        TYLENOL PO   Dose:  1000 mg        Take 1,000 mg by mouth   Refills:  0                Orders Needing Specimen Collection     None      Pending Results     No orders found from 5/22/2017 to 5/25/2017.            Pending Culture Results     No orders found from 5/22/2017 to 5/25/2017.            Thank you for choosing [facility]       Thank you for choosing [facility] for your care. Our goal is always to provide you with excellent care. Hearing back from our patients is one way we can continue to improve our services. Please take a few minutes to complete the written survey that you may receive in the mail after you visit with us. Thank you!        [employer] Information     [employer] lets you send messages  "to your doctor, view your test results, renew your prescriptions, schedule appointments and more. To sign up, go to [email or web address] . Click on \"Log in\" on the left side of the screen, which will take you to the Welcome page. Then click on \"Sign up Now\" on the right side of the page.     You will be asked to enter the access code listed below, as well as some personal information. Please follow the directions to create your username and password.     Your access code is: [email or web address]  Expires: 2017 12:31 PM     Your access code will  in 90 days. If you need help or a new code, please call your [facility] clinic or [phone].        Care EveryWhere ID     This is your Care EveryWhere ID. This could be used by other organizations to access your [facility] medical records  [id number]        After Visit Summary       This is your record. Keep this with you and show to your community pharmacist(s) and doctor(s) at your next visit.                  "

## 2017-05-24 NOTE — ED AVS SNAPSHOT
[facility]    [address] [address]    Phone:  [phone]                                       Jese Joe   MRN: [id number]    Department:  [facility]   Date of Visit:  5/24/2017           After Visit Summary Signature Page     I have received my discharge instructions, and my questions have been answered. I have discussed any challenges I see with this plan with the nurse or doctor.    ..........................................................................................................................................  Patient/Patient Representative Signature      ..........................................................................................................................................  Patient Representative Print Name and Relationship to Patient    ..................................................               ................................................  Date                                            Time    ..........................................................................................................................................  Reviewed by Signature/Title    ...................................................              ..............................................  Date                                                            Time

## 2017-05-25 NOTE — ED PROVIDER NOTES
History     Chief Complaint   Patient presents with     Wound Dehiscence     Left hand after injury 1 weeks ago     HPI  Jese Joe is a 42 year old male who presents to  for evaluation of wound left hand. He is 1 week out from laceration injury, repair per interrupted sutures. Today, while at work, the wound opened in the middle aspect, 3 of the sutures pulled through. Denies pain, no drainage.     I have reviewed the Medications, Allergies, Past Medical and Surgical History, and Social History in the Epic system.    Review of Systems   Constitutional: Negative for fever.   HENT: Negative for congestion.    Eyes: Negative for redness.   Respiratory: Negative for shortness of breath.    Cardiovascular: Negative for chest pain.   Gastrointestinal: Negative for abdominal pain.   Genitourinary: Negative for difficulty urinating.   Musculoskeletal: Negative for arthralgias and neck stiffness.   Skin: Positive for wound. Negative for color change.        Left thumb web space laceration, wound dehiscence, see HPI   Neurological: Negative for headaches.   Psychiatric/Behavioral: Negative for confusion.       Physical Exam   BP: 135/79  Pulse: 75  Temp: 97.9  F (36.6  C)  Resp: 16  SpO2: 98 %  Physical Exam   Constitutional: He is oriented to person, place, and time. No distress.   HENT:   Head: Normocephalic and atraumatic.   Eyes: Conjunctivae are normal.   Cardiovascular: Normal rate and regular rhythm.    Pulmonary/Chest: Effort normal. No respiratory distress.   Musculoskeletal: Normal range of motion.   Neurological: He is alert and oriented to person, place, and time.   Skin: Skin is warm and dry. Laceration noted. He is not diaphoretic.   2 cm laceration left thumb web space, 1 cm mid wound dehiscence. 3 un functioning suture removed, skin is pink, good granulation, no drainage.    Nursing note and vitals reviewed.      ED Course       Procedures       Assessments & Plan (with Medical Decision Making)   Pt  is 1 week s/p laceration repair left thumb web space with wound dehiscence. No infection, wound care provided, dressed, and splint given for protection. Epic discharge instructions given. Pt discharged in stable condition.     I have reviewed the nursing notes.    I have reviewed the findings, diagnosis, plan and need for follow up with the patient.    Discharge Medication List as of 5/24/2017  9:16 PM          Final diagnoses:   Wound dehiscence, initial encounter     Keep wound clean and dry  Cover with bacitracin oint and coban - change dressing daily  Thumb spica until wound has healed  Take out remaining stitches in 5 days  Return to  as needed    5/24/2017   [facility]     Kellee Devries APRN [facility]  05/30/17 0924

## 2017-05-30 ASSESSMENT — ENCOUNTER SYMPTOMS
COLOR CHANGE: 0
NECK STIFFNESS: 0
ARTHRALGIAS: 0
SHORTNESS OF BREATH: 0
CONFUSION: 0
DIFFICULTY URINATING: 0
FEVER: 0
HEADACHES: 0
WOUND: 1
EYE REDNESS: 0
ABDOMINAL PAIN: 0

## 2017-07-23 ENCOUNTER — HOSPITAL ENCOUNTER (EMERGENCY)
Facility: HOSPITAL | Age: 42
Discharge: HOME | End: 2017-07-23
Attending: NURSE PRACTITIONER | Admitting: NURSE PRACTITIONER
Payer: COMMERCIAL

## 2017-07-23 VITALS
TEMPERATURE: 97.3 F | RESPIRATION RATE: 16 BRPM | DIASTOLIC BLOOD PRESSURE: 87 MMHG | SYSTOLIC BLOOD PRESSURE: 127 MMHG | OXYGEN SATURATION: 99 %

## 2017-07-23 DIAGNOSIS — S46.911A SHOULDER STRAIN, RIGHT, INITIAL ENCOUNTER: ICD-10-CM

## 2017-07-23 DIAGNOSIS — M62.838 SPASM OF MUSCLE: ICD-10-CM

## 2017-07-23 PROCEDURE — 99214 OFFICE O/P EST MOD 30 MIN: CPT | Mod: 25

## 2017-07-23 PROCEDURE — 96372 THER/PROPH/DIAG INJ SC/IM: CPT

## 2017-07-23 PROCEDURE — 99213 OFFICE O/P EST LOW 20 MIN: CPT | Performed by: NURSE PRACTITIONER

## 2017-07-23 PROCEDURE — 25000128 H RX IP 250 OP 636: Performed by: NURSE PRACTITIONER

## 2017-07-23 PROCEDURE — 73030 X-RAY EXAM OF SHOULDER: CPT | Mod: TC,RT

## 2017-07-23 RX ORDER — PREDNISONE 20 MG/1
TABLET ORAL
Qty: 10 TABLET | Refills: 0 | Status: SHIPPED | OUTPATIENT
Start: 2017-07-23 | End: 2017-07-31

## 2017-07-23 RX ORDER — KETOROLAC TROMETHAMINE 30 MG/ML
60 INJECTION, SOLUTION INTRAMUSCULAR; INTRAVENOUS ONCE
Status: COMPLETED | OUTPATIENT
Start: 2017-07-23 | End: 2017-07-23

## 2017-07-23 RX ORDER — CYCLOBENZAPRINE HCL 10 MG
10 TABLET ORAL 3 TIMES DAILY PRN
Qty: 20 TABLET | Refills: 0 | Status: SHIPPED | OUTPATIENT
Start: 2017-07-23 | End: 2017-07-31

## 2017-07-23 RX ADMIN — KETOROLAC TROMETHAMINE 60 MG: 30 INJECTION, SOLUTION INTRAMUSCULAR; INTRAVENOUS at 12:55

## 2017-07-23 ASSESSMENT — ENCOUNTER SYMPTOMS
APPETITE CHANGE: 0
CHILLS: 0
FEVER: 0
ACTIVITY CHANGE: 1
NAUSEA: 0
DIARRHEA: 0
VOMITING: 0
DYSURIA: 0
NUMBNESS: 1
TROUBLE SWALLOWING: 0
PSYCHIATRIC NEGATIVE: 1

## 2017-07-23 NOTE — ED PROVIDER NOTES
"  History     Chief Complaint   Patient presents with     Shoulder Pain     c/o right shoulder pain     The history is provided by the patient. No  was used.     Jese Joe is a 42 year old male who presents with right shoulder pain. He has an old hockey injury to right shoulder that \"flares up.\" Pain is worse when his right arm is dependant. Pain has a sharp characteristic. No recent injury or trauma to right arm. He's taken ibuprofen and Norco with mild effectiveness. Last MRI of right shoulder was 3-4 years ago. Denies fever, chills, or night sweats. Eating and drinking well. Bowel and bladder are working well.     I have reviewed the Medications, Allergies, Past Medical and Surgical History, and Social History in the Epic system.      Review of Systems   Constitutional: Positive for activity change. Negative for appetite change, chills and fever.   HENT: Negative for trouble swallowing.    Gastrointestinal: Negative for diarrhea, nausea and vomiting.   Genitourinary: Negative for dysuria.   Musculoskeletal:        Right shoulder pain.    Skin: Negative for rash.   Neurological: Positive for numbness.        Numbness and tingling to right scapula down to right elbow region.    Psychiatric/Behavioral: Negative.        Physical Exam   BP: 127/87  Heart Rate: 104  Temp: 97.3  F (36.3  C)  Resp: 16  SpO2: 99 %  Physical Exam   Constitutional: He is oriented to person, place, and time. He appears well-developed and well-nourished. No distress.   HENT:   Head: Normocephalic.   Mouth/Throat: Oropharynx is clear and moist.   Neck: Normal range of motion. Neck supple.   Cardiovascular: Normal rate, regular rhythm, normal heart sounds and intact distal pulses.    No murmur heard.  Pulmonary/Chest: Effort normal. No respiratory distress. He has no wheezes. He has no rales.   Abdominal: Soft. He exhibits no distension.   Musculoskeletal: Normal range of motion. He exhibits tenderness. He exhibits no " edema or deformity.   CMS intact to right upper extremity. Limited ROM to 90 degrees on forward flexion and 60 degrees on backward extension. Negative can test. Tenderness with abduction. No tenderness with adduction. Right radial pulse +2. NO step offs to spinal column. NO TTP to spinal column. Tenderness to right scapula region that extends to the right sternocleidomastoid. Bicep tendon reflex intact.    Lymphadenopathy:     He has no cervical adenopathy.   Neurological: He is alert and oriented to person, place, and time.   Skin: Skin is warm and dry. No rash noted. He is not diaphoretic. No erythema.   No rash, erythema, bruising, or warmth to posterior back.    Psychiatric: He has a normal mood and affect. His behavior is normal.   Nursing note and vitals reviewed.      ED Course     ED Course     Procedures    I personally reviewed the x-rays and there is NO fracture or dislocation. Radiology review pending and nurse will notify patient if there is any change in the treatment plan.    Results for orders placed or performed during the hospital encounter of 07/23/17   XR Shoulder Right G/E 3 Views    Narrative    RIGHT SHOULDER FOUR VIEWS    HISTORY:  Pain.    FINDINGS:  The acromioclavicular joint is congruent.  The glenohumeral  joint is also congruent.  There is no evidence of fracture or  dislocation.  Soft tissues are unremarkable.    IMPRESSION:  NO EVIDENCE OF ACUTE OR SUBACUTE ABNORMALITY.  Exam Date: Jul 23, 2017 12:28:33 AM  Author: GERDA COTTON  This report is final and signed       Medications   ketorolac (TORADOL) injection 60 mg (60 mg Intramuscular Given 7/23/17 1255)     Monitored for 20 minutes and tolerated well.     Assessments & Plan (with Medical Decision Making)     Discussed plan of care. He verbalized understanding. All questions answered.     I have reviewed the nursing notes.    I have reviewed the findings, diagnosis, plan and need for follow up with the patient.  Discharged in  stable condition.     Discharge Medication List as of 7/23/2017  1:12 PM      START taking these medications    Details   predniSONE (DELTASONE) 20 MG tablet Take two tablets (= 40mg) each day for 5 (five) days, Disp-10 tablet, R-0, E-Prescribe      cyclobenzaprine (FLEXERIL) 10 MG tablet Take 1 tablet (10 mg) by mouth 3 times daily as needed for muscle spasms, Disp-20 tablet, R-0, E-Prescribe             Final diagnoses:   Shoulder strain, right, initial encounter   Spasm of muscle     Take tylenol and or ibuprofen for pain. Follow dosing on package.   Take Norco for pain not helped by tylenol and or ibuprofen. Do not drive or participate in activities that require alertness.   Take Flexeril as directed as needed for muscle spasm. Do not drive or participate in activities that require alertness.   Take Prednisone as directed. Take in the morning.   Apply ice to right side of back for 20 minutes every 1-2 hours. Protect skin.   Wear sling until follow up with PCP.   Work note.   Follow up with PCP this week.   Return to urgent care or emergency department with any increase in symptoms or concerns.     JUAN DIEGO Harrington  7/23/2017  12:33 PM  [facility]       Adilene Fuller NP  07/24/17 3518

## 2017-07-23 NOTE — DISCHARGE INSTRUCTIONS
Take tylenol and or ibuprofen for pain. Follow dosing on package.   Take Norco for pain not helped by tylenol and or ibuprofen. Do not drive or participate in activities that require alertness.   Take Flexeril as directed as needed for muscle spasm. Do not drive or participate in activities that require alertness.   Take Prednisone as directed. Take in the morning.   Apply ice to right side of back for 20 minutes every 1-2 hours. Protect skin.   Wear sling until follow up with PCP.   Work note.   Follow up with PCP this week.   Return to urgent care or emergency department with any increase in symptoms or concerns.

## 2017-07-23 NOTE — ED AVS SNAPSHOT
[facility]    [address] [address]    Phone:  [phone]                                       Jese Joe   MRN: [id number]    Department:  [facility]   Date of Visit:  7/23/2017           After Visit Summary Signature Page     I have received my discharge instructions, and my questions have been answered. I have discussed any challenges I see with this plan with the nurse or doctor.    ..........................................................................................................................................  Patient/Patient Representative Signature      ..........................................................................................................................................  Patient Representative Print Name and Relationship to Patient    ..................................................               ................................................  Date                                            Time    ..........................................................................................................................................  Reviewed by Signature/Title    ...................................................              ..............................................  Date                                                            Time

## 2017-07-23 NOTE — ED AVS SNAPSHOT
[facility]    [address] [address]    Phone:  [phone]                                       Jese Joe   MRN: [id number]    Department:  [facility]   Date of Visit:  7/23/2017           Patient Information     Date Of Birth          1975        Your diagnoses for this visit were:     Shoulder strain, right, initial encounter     Spasm of muscle        You were seen by Adilene Fuller NP.      Follow-up Information     Follow up with Andrei Philip MD In 1 week.    Specialty:  Family Practice    Why:  For re-evaluation.     Contact information:     [facility]  [address] [place] [address] [phone]          Follow up with [facility].    Specialty:  EMERGENCY MEDICINE    Why:  As needed, If symptoms worsen    Contact information:    [address] [address] [phone]    Additional information:    From [facility]: Take [address]. Turn left at [address]. Turn left at the first stoplight on [address]. At the first stop sign, take a right onto [address]. Take a left into the parking lot and continue through until you reach the North enterance of the building.       From [place]: Take [address]. Take the [address] ramp towards [place]. Turn left onto [address]. Take a slight right onto [address]. Turn left at the first stoplight on [address]. At the first stop sign, take a right onto [address]. Take a left into the parking lot and continue through until you reach the North enterance of the building.       From [place]: Take [address]. Take a right at [address]. At the first stop sign, take a right onto [address]. Take a left into the parking lot and continue through until you reach the North enterance of the building.         Discharge Instructions       Take tylenol and or ibuprofen for  pain. Follow dosing on package.   Take Norco for pain not helped by tylenol and or ibuprofen. Do not drive or participate in activities that require alertness.   Take Flexeril as directed as needed for muscle spasm. Do not drive or participate in activities that require alertness.   Take Prednisone as directed. Take in the morning.   Apply ice to right side of back for 20 minutes every 1-2 hours. Protect skin.   Wear sling until follow up with PCP.   Work note.   Follow up with PCP this week.   Return to urgent care or emergency department with any increase in symptoms or concerns.     Discharge References/Attachments     MUSCLE STRAIN, EXTREMITY (ENGLISH)    STRAINS AND SPRAINS, SELF-CARE FOR (ENGLISH)    STRAINS AND SPRAINS, TREATING (ENGLISH)    BACK SPASM, NO TRAUMA (ENGLISH)      Future Appointments        Provider Department Dept Phone Center    7/26/2017 8:45 AM Cuauhtemoc Aguirre MD [facility] [phone] [place]         Review of your medicines      START taking        Dose / Directions Last dose taken    cyclobenzaprine 10 MG tablet   Commonly known as:  FLEXERIL   Dose:  10 mg   Quantity:  20 tablet        Take 1 tablet (10 mg) by mouth 3 times daily as needed for muscle spasms   Refills:  0        predniSONE 20 MG tablet   Commonly known as:  DELTASONE   Quantity:  10 tablet        Take two tablets (= 40mg) each day for 5 (five) days   Refills:  0          Our records show that you are taking the medicines listed below. If these are incorrect, please call your family doctor or clinic.        Dose / Directions Last dose taken    albuterol 108 (90 BASE) MCG/ACT Inhaler   Commonly known as:  PROAIR HFA/PROVENTIL HFA/VENTOLIN HFA   Dose:  2 puff   Quantity:  1 Inhaler        Inhale 2 puffs into the lungs every 4 hours as needed for shortness of breath / dyspnea or wheezing   Refills:  0        HYDROcodone-acetaminophen 5-325 MG per tablet   Commonly known as:  NORCO   Quantity:  60 tablet      "   TAKE 1 TABLET BY MOUTH EVERY 4-6 HOURS AS NEEDED FOR PAIN   Refills:  0        IBUPROFEN PO   Dose:  800 mg        Take 800 mg by mouth   Refills:  0        ondansetron 4 MG ODT tab   Commonly known as:  ZOFRAN-ODT   Quantity:  20 tablet        DISSOLVE 1 TABLET IN MOUTH EVERY 6 HOURS AS NEEDED FOR NAUSEA   Refills:  0        TYLENOL PO   Dose:  1000 mg        Take 1,000 mg by mouth   Refills:  0                Prescriptions were sent or printed at these locations (2 Prescriptions)                   [id number] - [place] - [address] AT [facility] [address]   [address], [place] [address]    Telephone:  [phone]   Fax:  [phone]   Hours:                  E-Prescribed (2 of 2)         predniSONE (DELTASONE) 20 MG tablet               cyclobenzaprine (FLEXERIL) 10 MG tablet                Procedures and tests performed during your visit     Shoulder XR, G/E 3 views, left    Sling    XR Shoulder Right G/E 3 Views      Orders Needing Specimen Collection     None      Pending Results     Date and Time Order Name Status Description    7/23/2017 1219 XR Shoulder Right G/E 3 Views In process     7/23/2017 1205 Shoulder XR, G/E 3 views, left In process             Pending Culture Results     No orders found from 7/21/2017 to 7/24/2017.            Thank you for choosing [facility]       Thank you for choosing [facility] for your care. Our goal is always to provide you with excellent care. Hearing back from our patients is one way we can continue to improve our services. Please take a few minutes to complete the written survey that you may receive in the mail after you visit with us. Thank you!        Agenda Information     Agenda lets you send messages to your doctor, view your test results, renew your prescriptions, schedule appointments and more. To sign up, go to [email or web address] . Click on \"Log in\" on the left side of the screen, which will take you to the Welcome page. " "Then click on \"Sign up Now\" on the right side of the page.     You will be asked to enter the access code listed below, as well as some personal information. Please follow the directions to create your username and password.     Your access code is: [email or web address]  Expires: 10/21/2017  1:11 PM     Your access code will  in 90 days. If you need help or a new code, please call your [facility] clinic or [phone].        Care EveryWhere ID     This is your Care EveryWhere ID. This could be used by other organizations to access your [facility] medical records  [id number]        Equal Access to Services     [facility] Center[facility]ISHA : Yulissa Lala, nora french, archana deal, lazaro pepe. So [place] [phone].    ATENCIÓN: Si habla español, tiene a spangler disposición servicios gratuitos de asistencia lingüística. Llame al [phone].    We comply with applicable federal civil rights laws and [place] laws. We do not discriminate on the basis of race, color, national origin, age, disability sex, sexual orientation or gender identity.            After Visit Summary       This is your record. Keep this with you and show to your community pharmacist(s) and doctor(s) at your next visit.                  "

## 2017-07-23 NOTE — ED NOTES
Patient presents with complaints of right shoulder pain.  States he has an old surgery injury and on Thursday pain became much worse.  Unable to lift arm and is having back spasms with this as well.

## 2017-07-23 NOTE — LETTER
[facility]  [address] [address]  Phone: [phone]    July 23, 2017        Jese Joe  [address]   [address] [facility] [address]          To whom it may concern:    RE: Jese Yanceylibrado    Patient was seen and treated today at our clinic.    Please excuse from work on 7-22-17 & 7-23-17.     Sincerely,        JUAN DIEGO Harrington  7/23/2017  1:13 PM  URGENT CARE CLINIC

## 2017-07-23 NOTE — ED NOTES
Pt presents today alone for c/o right shoulder pain from an old hockey injury but has never had it hurt this bad says this last week has really bothering him.

## 2017-07-26 ENCOUNTER — OFFICE VISIT (OUTPATIENT)
Dept: CARE COORDINATION | Facility: OTHER | Age: 42
End: 2017-07-26
Attending: UROLOGY
Payer: COMMERCIAL

## 2017-07-26 ENCOUNTER — AMBULATORY - GICH (OUTPATIENT)
Dept: SCHEDULING | Facility: OTHER | Age: 42
End: 2017-07-26

## 2017-07-26 VITALS
HEIGHT: 72 IN | BODY MASS INDEX: 24.11 KG/M2 | SYSTOLIC BLOOD PRESSURE: 140 MMHG | TEMPERATURE: 98.4 F | DIASTOLIC BLOOD PRESSURE: 70 MMHG | WEIGHT: 178 LBS | HEART RATE: 76 BPM

## 2017-07-26 DIAGNOSIS — Z30.09 ENCOUNTER FOR VASECTOMY COUNSELING: Primary | ICD-10-CM

## 2017-07-26 PROCEDURE — 99213 OFFICE O/P EST LOW 20 MIN: CPT | Performed by: UROLOGY

## 2017-07-26 ASSESSMENT — PAIN SCALES - GENERAL: PAINLEVEL: MODERATE PAIN (4)

## 2017-07-26 NOTE — MR AVS SNAPSHOT
"              After Visit Summary   7/26/2017    Jese Joe    MRN: [id number]           Patient Information     Date Of Birth          1975        Visit Information        Provider Department      7/26/2017 8:45 AM Cuauhtemoc Aguirre MD [facility]        Today's Diagnoses     Encounter for vasectomy counseling    -  1       Follow-ups after your visit        Your next 10 appointments already scheduled     Jul 31, 2017  2:15 PM CDT   (Arrive by 2:00 PM)   SHORT with Andrei Philip MD   [facility]    [address]  [facility] [address]   [phone]              Who to contact     If you have questions or need follow up information about today's clinic visit or your schedule please contact [facility] directly at [phone].  Normal or non-critical lab and imaging results will be communicated to you by MyChart, letter or phone within 4 business days after the clinic has received the results. If you do not hear from us within 7 days, please contact the clinic through MyChart or phone. If you have a critical or abnormal lab result, we will notify you by phone as soon as possible.  Submit refill requests through "[employer] or call your pharmacy and they will forward the refill request to us. Please allow 3 business days for your refill to be completed.          Additional Information About Your Visit        MyChart Information     "[employer] lets you send messages to your doctor, view your test results, renew your prescriptions, schedule appointments and more. To sign up, go to [email or web address] . Click on \"Log in\" on the left side of the screen, which will take you to the Welcome page. Then click on \"Sign up Now\" on the right side of the page.     You will be asked to enter the access code listed below, as well as some personal information. Please follow the directions to create your username and password.     Your access code " "is: [email or web address]  Expires: 10/21/2017  1:11 PM     Your access code will  in 90 days. If you need help or a new code, please call your [facility] clinic or [phone].        Care EveryWhere ID     This is your Care EveryWhere ID. This could be used by other organizations to access your [facility] medical records  [id number]        Your Vitals Were     Pulse Temperature Height BMI (Body Mass Index)          76 98.4  F (36.9  C) (Tympanic) 1.816 m (5' 11.5\") 24.48 kg/m2         Blood Pressure from Last 3 Encounters:   17 140/70   17 127/87   17 135/79    Weight from Last 3 Encounters:   17 80.7 kg (178 lb)   04/23/15 86.2 kg (190 lb)   01/15/15 88.5 kg (195 lb)              Today, you had the following     No orders found for display       Primary Care Provider Office Phone # Fax #    Andrei Philip [phone]       [facility] [address] [facility] [address]  [facility] [address]        Equal Access to Services     [facility]ISHA AH: Hadii aad ku hadasho Soomaali, waaxda luqadaha, qaybta kaalmada adeegyada, waxay robbin hayarturon alton soto lajazmyn ah. So [facility] [phone].    ATENCIÓN: Si habla español, tiene a spangler disposición servicios gratuitos de asistencia lingüística. LlMercy Health Anderson Hospital [phone].    We comply with applicable federal civil rights laws and [place] laws. We do not discriminate on the basis of race, color, national origin, age, disability sex, sexual orientation or gender identity.            Thank you!     Thank you for choosing [facility] [place]  for your care. Our goal is always to provide you with excellent care. Hearing back from our patients is one way we can continue to improve our services. Please take a few minutes to complete the written survey that you may receive in the mail after your visit with us. Thank you!             Your Updated Medication List - Protect others around you: Learn how to safely use, store and throw away your medicines at " www.disposemymeds.org.          This list is accurate as of: 7/26/17  9:05 AM.  Always use your most recent med list.                   Brand Name Dispense Instructions for use Diagnosis    albuterol 108 (90 BASE) MCG/ACT Inhaler    PROAIR HFA/PROVENTIL HFA/VENTOLIN HFA    1 Inhaler    Inhale 2 puffs into the lungs every 4 hours as needed for shortness of breath / dyspnea or wheezing        cyclobenzaprine 10 MG tablet    FLEXERIL    20 tablet    Take 1 tablet (10 mg) by mouth 3 times daily as needed for muscle spasms        HYDROcodone-acetaminophen 5-325 MG per tablet    NORCO    60 tablet    TAKE 1 TABLET BY MOUTH EVERY 4-6 HOURS AS NEEDED FOR PAIN    Neck pain       IBUPROFEN PO      Take 800 mg by mouth        ondansetron 4 MG ODT tab    ZOFRAN-ODT    20 tablet    DISSOLVE 1 TABLET IN MOUTH EVERY 6 HOURS AS NEEDED FOR NAUSEA    Nausea       predniSONE 20 MG tablet    DELTASONE    10 tablet    Take two tablets (= 40mg) each day for 5 (five) days        TYLENOL PO      Take 1,000 mg by mouth

## 2017-07-26 NOTE — NURSING NOTE
"Chief Complaint   Patient presents with     Consult     Vasectomy.  Referred by coworker Juan A Laureano.       Initial /70 (BP Location: Right arm, Patient Position: Chair, Cuff Size: Adult Regular)  Pulse 76  Temp 98.4  F (36.9  C) (Tympanic)  Ht 5' 11.5\" (1.816 m)  Wt 178 lb (80.7 kg)  BMI 24.48 kg/m2 Estimated body mass index is 24.48 kg/(m^2) as calculated from the following:    Height as of this encounter: 5' 11.5\" (1.816 m).    Weight as of this encounter: 178 lb (80.7 kg).  Medication Reconciliation: complete   BERNARDO PIERCE    Review of Systems (currently do you have any of the following)    Weight loss No      Recent fever/chills No     Current skin rash No      Heat intolerance No      Chest pain No      Shortness of breath No     Constipation No      Nausea No       Kidney/side pain No      Frequent headaches No     Night sweats No  Recent hair loss No  Cold intolerance No  Palpitations No  Wheezing No  Diarrhea No  Vomiting No  Back pain Yes  Dizziness No  Calf pain No    Family History    Do you have parents, brothers or sisters with kidney cancer? No  Do you have parents, brothers or sisters with bladder cancer? No   BERNARDO PIERCE            "

## 2017-07-26 NOTE — PROGRESS NOTES
"Type of Visit  NPV    Chief Complaint  Elective sterilization    HPI  Mr. Joe is a 42 year old male who presents with desire for irreversible, elective sterilization.    He has 3 kids.    His significant other is supportive of this decision.    He has been considering this decision for 3 years.  He denies erectile dysfunction.      Past Medical History  None    Past Surgical History  He  has a past surgical history that includes NO SURGICAL HISTORY[.    Medications  He has a current medication list which includes the following prescription(s): prednisone, cyclobenzaprine, albuterol, hydrocodone-acetaminophen, acetaminophen, ondansetron, and ibuprofen.    Allergies  Allergies   Allergen Reactions     Cleocin Hives       Social History  He  reports that he quit smoking about 8 years ago. His smoking use included Cigarettes. He has a 4.00 pack-year smoking history. He has never used smokeless tobacco. He reports that he drinks alcohol. He reports that he does not use illicit drugs.  No drug abuse.    Family History  Family History   Problem Relation Age of Onset     DIABETES Maternal Grandfather      HEART DISEASE Paternal Grandfather      MI       Review of Systems  I personally reviewed the ROS with the patient.    Nursing Notes:   Bernardo Pierce LPN  7/26/2017  8:46 AM  Signed  Chief Complaint   Patient presents with     Consult     Vasectomy.  Referred by coworker Juan A Laureano.       Initial /70 (BP Location: Right arm, Patient Position: Chair, Cuff Size: Adult Regular)  Pulse 76  Temp 98.4  F (36.9  C) (Tympanic)  Ht 5' 11.5\" (1.816 m)  Wt 178 lb (80.7 kg)  BMI 24.48 kg/m2 Estimated body mass index is 24.48 kg/(m^2) as calculated from the following:    Height as of this encounter: 5' 11.5\" (1.816 m).    Weight as of this encounter: 178 lb (80.7 kg).  Medication Reconciliation: complete   BERNARDO PIERCE    Review of Systems (currently do you have any of the following)    Weight loss No      Recent " "fever/chills No     Current skin rash No      Heat intolerance No      Chest pain No      Shortness of breath No     Constipation No      Nausea No       Kidney/side pain No      Frequent headaches No     Night sweats No  Recent hair loss No  Cold intolerance No  Palpitations No  Wheezing No  Diarrhea No  Vomiting No  Back pain Yes  Dizziness No  Calf pain No    Family History    Do you have parents, brothers or sisters with kidney cancer? No  Do you have parents, brothers or sisters with bladder cancer? No   BERNARDO N STAN              Physical Exam  Vitals:    07/26/17 0843   BP: 140/70   BP Location: Right arm   Patient Position: Chair   Cuff Size: Adult Regular   Pulse: 76   Temp: 98.4  F (36.9  C)   TempSrc: Tympanic   Weight: 178 lb (80.7 kg)   Height: 5' 11.5\" (1.816 m)     Constitutional: NAD, WDWN.   Head: NCAT  Eyes: Conjunctivae normal  Cardiovascular: Regular rate.  Pulmonary/Chest: Respirations are even and non-labored bilaterally.  Abdominal: Soft. No distension, tenderness, masses or guarding. No CVA tenderness.  Neurological: A + O x 3.  Cranial Nerves II-XII grossly intact.  Extremities: MARV x 4, Warm. No clubbing.  No cyanosis.    Skin: Pink, warm and dry.  No rashes noted.  Psychiatric:  Normal mood and affect  Genitourinary:  Phallus normal without lesions, testicles descended bilaterally, no masses.    Bilateral vasa palpable    Assessment  Mr. Joe is a 42 year old male who presents today requesting permanent, irreversible surgical sterilization.  He was instructed to trim his pubic hair the night prior with a clippers.  The vasectomy was discussed in detail with the patient today including the risks which are failure of the procedure, infection, bleeding and/or development of chronic testicular pain.      Furthermore, the patient was told he would remain fertile following the procedure until he provided a semen analysis that met the definition of infertile (no sperm present or <100,000 " nonmotile sperm/mL)  This is usually planned for 3 months after the procedure.  The patient expressed understanding and desire to proceed.    Plan  Rx for Norco prior to and after the procedure.     He understands he needs a  the day of the procedure if he chooses to take the narcotic.  Provided vasectomy hand out again outlining the above risks and benefits as well as need for follow up semen analysis

## 2017-07-31 ENCOUNTER — OFFICE VISIT (OUTPATIENT)
Dept: FAMILY MEDICINE | Facility: OTHER | Age: 42
End: 2017-07-31
Attending: FAMILY MEDICINE
Payer: COMMERCIAL

## 2017-07-31 VITALS
BODY MASS INDEX: 24.24 KG/M2 | OXYGEN SATURATION: 98 % | SYSTOLIC BLOOD PRESSURE: 114 MMHG | TEMPERATURE: 96.7 F | HEIGHT: 72 IN | WEIGHT: 179 LBS | DIASTOLIC BLOOD PRESSURE: 72 MMHG | HEART RATE: 72 BPM

## 2017-07-31 DIAGNOSIS — M25.511 RIGHT SHOULDER PAIN, UNSPECIFIED CHRONICITY: ICD-10-CM

## 2017-07-31 DIAGNOSIS — M54.2 CERVICALGIA: Primary | ICD-10-CM

## 2017-07-31 DIAGNOSIS — M54.2 NECK PAIN: ICD-10-CM

## 2017-07-31 DIAGNOSIS — Z71.89 ACP (ADVANCE CARE PLANNING): Chronic | ICD-10-CM

## 2017-07-31 PROCEDURE — 99214 OFFICE O/P EST MOD 30 MIN: CPT | Performed by: FAMILY MEDICINE

## 2017-07-31 PROCEDURE — 72050 X-RAY EXAM NECK SPINE 4/5VWS: CPT | Mod: TC | Performed by: RADIOLOGY

## 2017-07-31 RX ORDER — CYCLOBENZAPRINE HCL 10 MG
10 TABLET ORAL 3 TIMES DAILY PRN
Qty: 60 TABLET | Refills: 0 | Status: SHIPPED | OUTPATIENT
Start: 2017-07-31 | End: 2018-11-20

## 2017-07-31 RX ORDER — HYDROCODONE BITARTRATE AND ACETAMINOPHEN 5; 325 MG/1; MG/1
TABLET ORAL
Qty: 60 TABLET | Refills: 0 | Status: SHIPPED | OUTPATIENT
Start: 2017-07-31 | End: 2017-09-11

## 2017-07-31 ASSESSMENT — ANXIETY QUESTIONNAIRES
5. BEING SO RESTLESS THAT IT IS HARD TO SIT STILL: NOT AT ALL
6. BECOMING EASILY ANNOYED OR IRRITABLE: NOT AT ALL
1. FEELING NERVOUS, ANXIOUS, OR ON EDGE: NOT AT ALL
2. NOT BEING ABLE TO STOP OR CONTROL WORRYING: NOT AT ALL
3. WORRYING TOO MUCH ABOUT DIFFERENT THINGS: NOT AT ALL

## 2017-07-31 ASSESSMENT — PATIENT HEALTH QUESTIONNAIRE - PHQ9: 5. POOR APPETITE OR OVEREATING: NOT AT ALL

## 2017-07-31 ASSESSMENT — PAIN SCALES - GENERAL: PAINLEVEL: SEVERE PAIN (7)

## 2017-07-31 NOTE — PROGRESS NOTES
Jese Joe    July 31, 2017    Chief Complaint   Patient presents with     RECHECK     U-C f/u; shoulder/back pain       SUBJECTIVE:  Here for f/u.  Has ongoing right shoulder and pain up in the neck. He is getting numb on the first 3 fingers in the right hand as well, which is new.  Longstanding shoulder issues. Strained the upper back/neck recently as well which may or may not be contributing.  See below.     No past medical history on file.    Past Surgical History:   Procedure Laterality Date     NO SURGICAL HISTORY[         Current Outpatient Prescriptions   Medication Sig Dispense Refill     HYDROcodone-acetaminophen (NORCO) 5-325 MG per tablet TAKE 1 TABLET BY MOUTH EVERY 4-6 HOURS AS NEEDED FOR PAIN 60 tablet 0     cyclobenzaprine (FLEXERIL) 10 MG tablet Take 1 tablet (10 mg) by mouth 3 times daily as needed for muscle spasms 60 tablet 0     albuterol (PROAIR HFA/PROVENTIL HFA/VENTOLIN HFA) 108 (90 BASE) MCG/ACT Inhaler Inhale 2 puffs into the lungs every 4 hours as needed for shortness of breath / dyspnea or wheezing 1 Inhaler 0     Acetaminophen (TYLENOL PO) Take 1,000 mg by mouth       ondansetron (ZOFRAN-ODT) 4 MG disintegrating tablet DISSOLVE 1 TABLET IN MOUTH EVERY 6 HOURS AS NEEDED FOR NAUSEA 20 tablet 0     IBUPROFEN PO Take 800 mg by mouth         Allergies   Allergen Reactions     Cleocin Hives       Family History   Problem Relation Age of Onset     DIABETES Maternal Grandfather      HEART DISEASE Paternal Grandfather      MI       Social History     Social History     Marital status:      Spouse name: N/A     Number of children: N/A     Years of education: N/A     Occupational History     Not on file.     Social History Main Topics     Smoking status: Former Smoker     Packs/day: 1.00     Years: 4.00     Types: Cigarettes     Quit date: 5/23/2009     Smokeless tobacco: Never Used     Alcohol use Yes      Comment: occasionally     Drug use: No     Sexual activity: Yes     Partners:  Female     Other Topics Concern      Service No     Blood Transfusions Yes     Permits if needed     Caffeine Concern Yes     coffee, soda > 6 cups a day     Occupational Exposure No     Hobby Hazards No     Sleep Concern No     Stress Concern No     Weight Concern No     Special Diet No     Back Care Yes     upper neck     Exercise No     Seat Belt Yes     Self-Exams Yes     Parent/Sibling W/ Cabg, Mi Or Angioplasty Before 65f 55m? No     Social History Narrative       5 point ROS negative except as noted above in HPI, including Gen., Resp., CV, GI &  system review.     OBJECTIVE:  B/P: 114/72, T: 96.7, P: 72, R: Data Unavailable    GENERAL APPEARANCE: Alert, no acute distress  MSK:   preserved bilaterally.  Pain with movement of the neck.  Significant pain in the right shoulder with abduction laterally.      SKIN: no suspicious lesions or rashes to visualized skin  NEURO: Alert, oriented x 3, speech and mentation normal    ASSESSMENT and PLAN:  (M54.2) Cervicalgia  (primary encounter diagnosis)  Comment: likely shoulder.   Plan: XR CERVICAL SPINE G/E 4 VIEWS (Clinic         Performed), cyclobenzaprine (FLEXERIL) 10 MG         tablet        Discussed.  Getting the baseline film and focusing on the shoulder.      (M25.511) Right shoulder pain, unspecified chronicity  Comment: very painful.  Plan: MR Shoulder Right w/o Contrast         MRI update.  This has been a chronic issue.     (M54.2) Neck pain  Comment: as above.   Plan: HYDROcodone-acetaminophen (NORCO) 5-325 MG per         tablet, cyclobenzaprine (FLEXERIL) 10 MG tablet        As above.  Sent the meds without issue.      He is off work until we can get this figured out.  Plan PT pending MRI results and xray results.

## 2017-07-31 NOTE — NURSING NOTE
"Chief Complaint   Patient presents with     RECHECK     U-C f/u; shoulder/back pain       Initial /72 (BP Location: Left arm, Cuff Size: Adult Regular)  Pulse 72  Temp 96.7  F (35.9  C) (Tympanic)  Ht 5' 11.5\" (1.816 m)  Wt 179 lb (81.2 kg)  SpO2 98%  BMI 24.62 kg/m2 Estimated body mass index is 24.62 kg/(m^2) as calculated from the following:    Height as of this encounter: 5' 11.5\" (1.816 m).    Weight as of this encounter: 179 lb (81.2 kg).  Medication Reconciliation: complete   Indira Santoro LPN      "

## 2017-07-31 NOTE — MR AVS SNAPSHOT
After Visit Summary   7/31/2017    Jese Joe    MRN: [id number]           Patient Information     Date Of Birth          1975        Visit Information        Provider Department      7/31/2017 2:15 PM Andrei Philip MD [facility]        Today's Diagnoses     Cervicalgia    -  1    ACP (advance care planning)        Right shoulder pain, unspecified chronicity        Neck pain          Care Instructions    F/u with ongoing concerns.           Follow-ups after your visit        Your next 10 appointments already scheduled     Aug 02, 2017  9:30 AM CDT   Radiology with HI MRI   HI MRI ([facility] )    [address] [address] [phone]            Aug 07, 2017  2:15 PM CDT   (Arrive by 2:00 PM)   SHORT with Andrei Philip MD   [facility] ([facility] )    [address]  [facility] [address]   [phone]              Who to contact     If you have questions or need follow up information about today's clinic visit or your schedule please contact [facility] directly at [phone].  Normal or non-critical lab and imaging results will be communicated to you by MyChart, letter or phone within 4 business days after the clinic has received the results. If you do not hear from us within 7 days, please contact the clinic through Red Seraphimhart or phone. If you have a critical or abnormal lab result, we will notify you by phone as soon as possible.  Submit refill requests through Instahealth or call your pharmacy and they will forward the refill request to us. Please allow 3 business days for your refill to be completed.          Additional Information About Your Visit        MyChart Information     [employer] gives you secure access to your electronic health record. If you see a primary care provider, you can also send messages to your care team and make appointments. If you have questions, please call your  "primary care clinic.  If you do not have a primary care provider, please call [phone] and they will assist you.        Care EveryWhere ID     This is your Care EveryWhere ID. This could be used by other organizations to access your [place] medical records  [id number]        Your Vitals Were     Pulse Temperature Height Pulse Oximetry BMI (Body Mass Index)       72 96.7  F (35.9  C) (Tympanic) 5' 11.5\" (1.816 m) 98% 24.62 kg/m2        Blood Pressure from Last 3 Encounters:   07/31/17 114/72   07/26/17 140/70   07/23/17 127/87    Weight from Last 3 Encounters:   07/31/17 179 lb (81.2 kg)   07/26/17 178 lb (80.7 kg)   04/23/15 190 lb (86.2 kg)              We Performed the Following     XR CERVICAL SPINE G/E 4 VIEWS (Clinic Performed)          Today's Medication Changes          These changes are accurate as of: 7/31/17  3:19 PM.  If you have any questions, ask your nurse or doctor.               These medicines have changed or have updated prescriptions.        Dose/Directions    HYDROcodone-acetaminophen 5-325 MG per tablet   Commonly known as:  NORCO   This may have changed:  See the new instructions.   Used for:  Neck pain   Changed by:  Andrei Philip MD        TAKE 1 TABLET BY MOUTH EVERY 4-6 HOURS AS NEEDED FOR PAIN   Quantity:  60 tablet   Refills:  0            Where to get your medicines      These medications [facility] to [facility] [address] [place] [address] [address] AT [facility] [address], [facility] [address]     Phone:  [phone]     cyclobenzaprine 10 MG tablet         Some of these will need a paper prescription and others can be bought over the counter.  Ask your nurse if you have questions.     Bring a paper prescription for each of these medications     HYDROcodone-acetaminophen 5-325 MG per tablet                Primary Care Provider Office Phone # Fax #    Andrei Philip [phone]       [place] [address]  [facility] [address]        Equal Access to Services     BRENNAN TELLO : Hadii tacos patel angelo Sokurtisali, waaxda luqadaha, qaybta kashilohkurt deal, lazaro connollymanitye pepe. So [facility] [phone].    ATENCIÓN: Si habla español, tiene a spangler disposición servicios gratuitos de asistencia lingüística. Luisaame al [phone].    We comply with applicable federal civil rights laws and [place] laws. We do not discriminate on the basis of race, color, national origin, age, disability sex, sexual orientation or gender identity.            Thank you!     Thank you for choosing [facility]  for your care. Our goal is always to provide you with excellent care. Hearing back from our patients is one way we can continue to improve our services. Please take a few minutes to complete the written survey that you may receive in the mail after your visit with us. Thank you!             Your Updated Medication List - Protect others around you: Learn how to safely use, store and throw away your medicines at www.disposemymeds.org.          This list is accurate as of: 7/31/17  3:19 PM.  Always use your most recent med list.                   Brand Name Dispense Instructions for use Diagnosis    albuterol 108 (90 BASE) MCG/ACT Inhaler    PROAIR HFA/PROVENTIL HFA/VENTOLIN HFA    1 Inhaler    Inhale 2 puffs into the lungs every 4 hours as needed for shortness of breath / dyspnea or wheezing        cyclobenzaprine 10 MG tablet    FLEXERIL    60 tablet    Take 1 tablet (10 mg) by mouth 3 times daily as needed for muscle spasms    Cervicalgia, Neck pain       HYDROcodone-acetaminophen 5-325 MG per tablet    NORCO    60 tablet    TAKE 1 TABLET BY MOUTH EVERY 4-6 HOURS AS NEEDED FOR PAIN    Neck pain       IBUPROFEN PO      Take 800 mg by mouth        ondansetron 4 MG ODT tab    ZOFRAN-ODT    20 tablet    DISSOLVE 1 TABLET IN MOUTH EVERY 6 HOURS AS NEEDED FOR NAUSEA    Nausea       TYLENOL PO      Take 1,000 mg by  mouth

## 2017-08-01 ASSESSMENT — PATIENT HEALTH QUESTIONNAIRE - PHQ9: SUM OF ALL RESPONSES TO PHQ QUESTIONS 1-9: 1

## 2017-08-02 ENCOUNTER — HOSPITAL ENCOUNTER (OUTPATIENT)
Dept: MRI IMAGING | Facility: HOSPITAL | Age: 42
Discharge: HOME | End: 2017-08-02
Attending: FAMILY MEDICINE | Admitting: FAMILY MEDICINE
Payer: COMMERCIAL

## 2017-08-02 PROCEDURE — 73221 MRI JOINT UPR EXTREM W/O DYE: CPT | Mod: TC,RT

## 2017-08-03 ENCOUNTER — OFFICE VISIT (OUTPATIENT)
Dept: CHIROPRACTIC MEDICINE | Facility: OTHER | Age: 42
End: 2017-08-03
Attending: CHIROPRACTOR
Payer: COMMERCIAL

## 2017-08-03 DIAGNOSIS — M99.01 SEGMENTAL AND SOMATIC DYSFUNCTION OF CERVICAL REGION: ICD-10-CM

## 2017-08-03 DIAGNOSIS — M99.03 SEGMENTAL AND SOMATIC DYSFUNCTION OF LUMBAR REGION: Primary | ICD-10-CM

## 2017-08-03 DIAGNOSIS — M54.50 ACUTE BILATERAL LOW BACK PAIN WITHOUT SCIATICA: ICD-10-CM

## 2017-08-03 DIAGNOSIS — M99.02 SEGMENTAL AND SOMATIC DYSFUNCTION OF THORACIC REGION: ICD-10-CM

## 2017-08-03 PROCEDURE — 98941 CHIROPRACT MANJ 3-4 REGIONS: CPT | Mod: AT | Performed by: CHIROPRACTOR

## 2017-08-03 NOTE — MR AVS SNAPSHOT
After Visit Summary   8/3/2017    Jese Joe    MRN: [id number]           Patient Information     Date Of Birth          1975        Visit Information        Provider Department      8/3/2017 4:30 PM Moisés Judd DC  [facility]        Today's Diagnoses     Segmental and somatic dysfunction of lumbar region    -  1    Acute bilateral low back pain without sciatica        Segmental and somatic dysfunction of thoracic region        Segmental and somatic dysfunction of cervical region           Follow-ups after your visit        Who to contact     If you have questions or need follow up information about today's clinic visit or your schedule please contact  [facility] directly at [phone].  Normal or non-critical lab and imaging results will be communicated to you by Biglionhart, letter or phone within 4 business days after the clinic has received the results. If you do not hear from us within 7 days, please contact the clinic through Biglionhart or phone. If you have a critical or abnormal lab result, we will notify you by phone as soon as possible.  Submit refill requests through Glisten or call your pharmacy and they will forward the refill request to us. Please allow 3 business days for your refill to be completed.          Additional Information About Your Visit        MyChart Information     [employer]listen gives you secure access to your electronic health record. If you see a primary care provider, you can also send messages to your care team and make appointments. If you have questions, please call your primary care clinic.  If you do not have a primary care provider, please call [phone] and they will assist you.        Care EveryWhere ID     This is your Care EveryWhere ID. This could be used by other organizations to access your [place] medical records  [id number]         Blood Pressure from Last 3 Encounters:   08/07/17 120/62   07/31/17 114/72   07/26/17 140/70     Weight from Last 3 Encounters:   08/07/17 179 lb (81.2 kg)   07/31/17 179 lb (81.2 kg)   07/26/17 178 lb (80.7 kg)              We Performed the Following     CHIROPRAC MANIP,SPINAL,3-4 REGIONS        Primary Care Provider Office Phone # Fax #    Andrei Philip [phone]       [address]  [facility] [address]        Equal Access to Services     DANG TELLO : Hadii aad ku hadasho Soomaali, waaxda luqadaha, qaybta kaalmada adeegyada, waxay idiin hayaan adeeg kharash la'aan . So [facility] [phone].    ATENCIÓN: Si habla español, tiene a spangler disposición servicios gratuitos de asistencia lingüística. [facility] [phone].    We comply with applicable federal civil rights laws and [place] laws. We do not discriminate on the basis of race, color, national origin, age, disability sex, sexual orientation or gender identity.            Thank you!     Thank you for choosing  [facility]  for your care. Our goal is always to provide you with excellent care. Hearing back from our patients is one way we can continue to improve our services. Please take a few minutes to complete the written survey that you may receive in the mail after your visit with us. Thank you!             Your Updated Medication List - Protect others around you: Learn how to safely use, store and throw away your medicines at www.disposemymeds.org.          This list is accurate as of: 8/3/17 11:59 PM.  Always use your most recent med list.                   Brand Name Dispense Instructions for use Diagnosis    albuterol 108 (90 BASE) MCG/ACT Inhaler    PROAIR HFA/PROVENTIL HFA/VENTOLIN HFA    1 Inhaler    Inhale 2 puffs into the lungs every 4 hours as needed for shortness of breath / dyspnea or wheezing        cyclobenzaprine 10 MG tablet    FLEXERIL    60 tablet    Take 1 tablet (10 mg) by mouth 3 times daily as needed for muscle spasms    Cervicalgia, Neck pain       HYDROcodone-acetaminophen  5-325 MG per tablet    NORCO    60 tablet    TAKE 1 TABLET BY MOUTH EVERY 4-6 HOURS AS NEEDED FOR PAIN    Neck pain       IBUPROFEN PO      Take 800 mg by mouth        ondansetron 4 MG ODT tab    ZOFRAN-ODT    20 tablet    DISSOLVE 1 TABLET IN MOUTH EVERY 6 HOURS AS NEEDED FOR NAUSEA    Nausea       TYLENOL PO      Take 1,000 mg by mouth

## 2017-08-07 ENCOUNTER — OFFICE VISIT (OUTPATIENT)
Dept: FAMILY MEDICINE | Facility: OTHER | Age: 42
End: 2017-08-07
Attending: FAMILY MEDICINE
Payer: COMMERCIAL

## 2017-08-07 VITALS
TEMPERATURE: 97.3 F | BODY MASS INDEX: 24.24 KG/M2 | SYSTOLIC BLOOD PRESSURE: 120 MMHG | DIASTOLIC BLOOD PRESSURE: 62 MMHG | WEIGHT: 179 LBS | HEIGHT: 72 IN | OXYGEN SATURATION: 96 % | HEART RATE: 80 BPM

## 2017-08-07 DIAGNOSIS — M25.511 ACUTE PAIN OF RIGHT SHOULDER: Primary | ICD-10-CM

## 2017-08-07 PROCEDURE — 99213 OFFICE O/P EST LOW 20 MIN: CPT | Performed by: FAMILY MEDICINE

## 2017-08-07 ASSESSMENT — PATIENT HEALTH QUESTIONNAIRE - PHQ9
SUM OF ALL RESPONSES TO PHQ QUESTIONS 1-9: 0
5. POOR APPETITE OR OVEREATING: NOT AT ALL

## 2017-08-07 ASSESSMENT — ANXIETY QUESTIONNAIRES
1. FEELING NERVOUS, ANXIOUS, OR ON EDGE: NOT AT ALL
7. FEELING AFRAID AS IF SOMETHING AWFUL MIGHT HAPPEN: NOT AT ALL
3. WORRYING TOO MUCH ABOUT DIFFERENT THINGS: NOT AT ALL
6. BECOMING EASILY ANNOYED OR IRRITABLE: NOT AT ALL
5. BEING SO RESTLESS THAT IT IS HARD TO SIT STILL: NOT AT ALL
2. NOT BEING ABLE TO STOP OR CONTROL WORRYING: NOT AT ALL
GAD7 TOTAL SCORE: 0

## 2017-08-07 ASSESSMENT — PAIN SCALES - GENERAL: PAINLEVEL: MODERATE PAIN (5)

## 2017-08-07 NOTE — PROGRESS NOTES
Jese RAUDEL Tatyana    August 7, 2017    Chief Complaint   Patient presents with     Musculoskeletal Problem     MRI results right shoulder       SUBJECTIVE:  Here for f/u.  Right arm still numb and very painful in the shoulder.  He can't really use the right arm at all.  He has been off work and we did [facility] paperwork.      No past medical history on file.    Past Surgical History:   Procedure Laterality Date     NO SURGICAL HISTORY[         Current Outpatient Prescriptions   Medication Sig Dispense Refill     HYDROcodone-acetaminophen (NORCO) 5-325 MG per tablet TAKE 1 TABLET BY MOUTH EVERY 4-6 HOURS AS NEEDED FOR PAIN 60 tablet 0     cyclobenzaprine (FLEXERIL) 10 MG tablet Take 1 tablet (10 mg) by mouth 3 times daily as needed for muscle spasms 60 tablet 0     albuterol (PROAIR HFA/PROVENTIL HFA/VENTOLIN HFA) 108 (90 BASE) MCG/ACT Inhaler Inhale 2 puffs into the lungs every 4 hours as needed for shortness of breath / dyspnea or wheezing 1 Inhaler 0     Acetaminophen (TYLENOL PO) Take 1,000 mg by mouth       ondansetron (ZOFRAN-ODT) 4 MG disintegrating tablet DISSOLVE 1 TABLET IN MOUTH EVERY 6 HOURS AS NEEDED FOR NAUSEA 20 tablet 0     IBUPROFEN PO Take 800 mg by mouth         Allergies   Allergen Reactions     Cleocin Hives       Family History   Problem Relation Age of Onset     DIABETES Maternal Grandfather      HEART DISEASE Paternal Grandfather      MI       Social History     Social History     Marital status:      Spouse name: N/A     Number of children: N/A     Years of education: N/A     Occupational History     Not on file.     Social History Main Topics     Smoking status: Former Smoker     Packs/day: 1.00     Years: 4.00     Types: Cigarettes     Quit date: 5/23/2009     Smokeless tobacco: Never Used     Alcohol use Yes      Comment: occasionally     Drug use: No     Sexual activity: Yes     Partners: Female     Other Topics Concern      Service No     Blood Transfusions Yes     Permits if  needed     Caffeine Concern Yes     coffee, soda > 6 cups a day     Occupational Exposure No     Hobby Hazards No     Sleep Concern No     Stress Concern No     Weight Concern No     Special Diet No     Back Care Yes     upper neck     Exercise No     Seat Belt Yes     Self-Exams Yes     Parent/Sibling W/ Cabg, Mi Or Angioplasty Before 65f 55m? No     Social History Narrative       5 point ROS negative except as noted above in HPI, including Gen., Resp., CV, GI &  system review.     OBJECTIVE:  B/P: 120/62, T: 97.3, P: 80, R: Data Unavailable    GENERAL APPEARANCE: Alert, no acute distress  MSK:  Pain with any shoulder motion on the right.  SKIN: no suspicious lesions or rashes to visualized skin  NEURO: Alert, oriented x 3, speech and mentation normal    ASSESSMENT and PLAN:  (M22.284) Acute pain of right shoulder  (primary encounter diagnosis)  Comment: partial tear and distal acromion abnormality.    Plan: ORTHOPEDICS ADULT REFERRAL        Discussed.  I didn't want to inject for fear of a full tear.  Asking Dr. Loaiza to see.

## 2017-08-07 NOTE — MR AVS SNAPSHOT
After Visit Summary   8/7/2017    Jese Joe    MRN: [id number]           Patient Information     Date Of Birth          1975        Visit Information        Provider Department      8/7/2017 2:15 PM Andrei Philip MD [facility]        Today's Diagnoses     Acute pain of right shoulder    -  1      Care Instructions    F/u with ongoing concerns.           Follow-ups after your visit        Additional Services     ORTHOPEDICS ADULT REFERRAL       Your provider has referred you to: Dr. Loaiza in [facility]    Please be aware that coverage of these services is subject to the terms and limitations of your health insurance plan.  Call member services at your health plan with any benefit or coverage questions.      Please bring the following to your appointment:    >>   Any x-rays, CTs or MRIs which have been performed.  Contact the facility where they were done to arrange for  prior to your scheduled appointment.    >>   List of current medications   >>   This referral request   >>   Any documents/labs given to you for this referral                  Who to contact     If you have questions or need follow up information about today's clinic visit or your schedule please contact [facility] directly at [phone].  Normal or non-critical lab and imaging results will be communicated to you by MyChart, letter or phone within 4 business days after the clinic has received the results. If you do not hear from us within 7 days, please contact the clinic through YourPlacehart or phone. If you have a critical or abnormal lab result, we will notify you by phone as soon as possible.  Submit refill requests through [employer] or call your pharmacy and they will forward the refill request to us. Please allow 3 business days for your refill to be completed.          Additional Information About Your Visit        MyChart Information     [employer] gives you secure access to your  "electronic health record. If you see a primary care provider, you can also send messages to your care team and make appointments. If you have questions, please call your primary care clinic.  If you do not have a primary care provider, please call [phone] and they will assist you.        Care EveryWhere ID     This is your Care EveryWhere ID. This could be used by other organizations to access your [place] medical records  [id number]        Your Vitals Were     Pulse Temperature Height Pulse Oximetry BMI (Body Mass Index)       80 97.3  F (36.3  C) (Tympanic) 5' 11.5\" (1.816 m) 96% 24.62 kg/m2        Blood Pressure from Last 3 Encounters:   08/07/17 120/62   07/31/17 114/72   07/26/17 140/70    Weight from Last 3 Encounters:   08/07/17 179 lb (81.2 kg)   07/31/17 179 lb (81.2 kg)   07/26/17 178 lb (80.7 kg)              We Performed the Following     ORTHOPEDICS ADULT REFERRAL        Primary Care Provider Office Phone # Fax #    Andrei Philip [phone]       [facility] [address]  [facility] [address]        Equal Access to Services     BRENNAN TELLO : Hadii aad ku hadasho Soomaali, waaxda luqadaha, qaybta kaalmada adeegyada, waxay idiin hayaan adeeg charles lajazmyn . So [facility] [phone].    ATENCIÓN: Si habla español, tiene a spangler disposición servicios gratuitos de asistencia lingüística. Llame al [phone].    We comply with applicable federal civil rights laws and [place] laws. We do not discriminate on the basis of race, color, national origin, age, disability sex, sexual orientation or gender identity.            Thank you!     Thank you for choosing [facility]3]  for your care. Our goal is always to provide you with excellent care. Hearing back from our patients is one way we can continue to improve our services. Please take a few minutes to complete the written survey that you may receive in the mail after your visit with us. Thank you!           "   Your Updated Medication List - Protect others around you: Learn how to safely use, store and throw away your medicines at www.disposemymeds.org.          This list is accurate as of: 8/7/17  2:27 PM.  Always use your most recent med list.                   Brand Name Dispense Instructions for use Diagnosis    albuterol 108 (90 BASE) MCG/ACT Inhaler    PROAIR HFA/PROVENTIL HFA/VENTOLIN HFA    1 Inhaler    Inhale 2 puffs into the lungs every 4 hours as needed for shortness of breath / dyspnea or wheezing        cyclobenzaprine 10 MG tablet    FLEXERIL    60 tablet    Take 1 tablet (10 mg) by mouth 3 times daily as needed for muscle spasms    Cervicalgia, Neck pain       HYDROcodone-acetaminophen 5-325 MG per tablet    NORCO    60 tablet    TAKE 1 TABLET BY MOUTH EVERY 4-6 HOURS AS NEEDED FOR PAIN    Neck pain       IBUPROFEN PO      Take 800 mg by mouth        ondansetron 4 MG ODT tab    ZOFRAN-ODT    20 tablet    DISSOLVE 1 TABLET IN MOUTH EVERY 6 HOURS AS NEEDED FOR NAUSEA    Nausea       TYLENOL PO      Take 1,000 mg by mouth

## 2017-08-08 ASSESSMENT — ANXIETY QUESTIONNAIRES: GAD7 TOTAL SCORE: 0

## 2017-08-09 ENCOUNTER — TRANSFERRED RECORDS (OUTPATIENT)
Dept: HEALTH INFORMATION MANAGEMENT | Facility: HOSPITAL | Age: 42
End: 2017-08-09

## 2017-08-16 ENCOUNTER — HOSPITAL ENCOUNTER (OUTPATIENT)
Dept: PHYSICAL THERAPY | Facility: HOSPITAL | Age: 42
Setting detail: THERAPIES SERIES
End: 2017-08-16
Attending: ORTHOPAEDIC SURGERY
Payer: COMMERCIAL

## 2017-08-16 PROCEDURE — 97110 THERAPEUTIC EXERCISES: CPT | Mod: GP | Performed by: PHYSICAL THERAPIST

## 2017-08-16 PROCEDURE — 40000718 ZZHC STATISTIC PT DEPARTMENT ORTHO VISIT: Performed by: PHYSICAL THERAPIST

## 2017-08-16 PROCEDURE — 97161 PT EVAL LOW COMPLEX 20 MIN: CPT | Mod: GP | Performed by: PHYSICAL THERAPIST

## 2017-08-16 PROCEDURE — 97140 MANUAL THERAPY 1/> REGIONS: CPT | Mod: GP | Performed by: PHYSICAL THERAPIST

## 2017-08-18 NOTE — PROGRESS NOTES
08/16/17 0822   General Information   Type of Visit Initial OP Ortho PT Evaluation   Start of Care Date 08/16/17   Referring Physician Dr. Loaiza   Patient/Family Goals Statement regain full strength/function of R UE and be able to return to work   Orders Evaluate and Treat   Orders Comment Right shoulder pain/neck pain/referred pain right upper extremity.  C-spine manual therapy, scapular strengthening, rotator cuff strengthening   Date of Order 08/09/17   Insurance Type Other   Insurance Comments/Visits Authorized Preferred One, no limits   Medical Diagnosis Right shoulder pain   Surgical/Medical history reviewed Yes   Body Part(s)   Body Part(s) Cervical Spine;Shoulder   Presentation and Etiology   Pertinent history of current problem (include personal factors and/or comorbidities that impact the POC) Pt injure his shoulder in the past with ongoing issues off and on and did therapy in 2015 with good relief.  Pt woke up around July 22nd with R UE in spasm, severe numbness and tingling, and swollen and couldn't move it well especially above shoulder height.  Pt saw Dr. Philip first then saw Moisés Barney for adjustment with some relief and then had injection of R shoulder by Dr. Loaiza on 8/8/17 with significant relief of pain however still has severe numbness/tingling throughout entire arm and difficulty maintaining  on objects.  Most severe in first 2 fingers also and pt states that he has difficulty holding onto and grasping objects.  Pt has been off work since onset of symptoms.     Impairments A. Pain;D. Decreased ROM;E. Decreased flexibility;F. Decreased strength and endurance;J. Burning;K. Numbness;L. Tingling   Functional Limitations perform required work activities;perform activities of daily living;perform desired leisure / sports activities   Symptom Location R shoulder, neck, throughout R UE   How/Where did it occur Other  (Initial injury when he was 12 then had off/on trouble )   Onset date of  current episode/exacerbation 07/22/17   Chronicity Recurrent   Pain rating (0-10 point scale) Best (/10);Worst (/10)   Best (/10) 2   Worst (/10) 9   Pain quality A. Sharp;C. Aching;D. Burning   Frequency of pain/symptoms A. Constant   Pain/symptoms are: The same all the time   Pain/symptoms exacerbated by A. Sitting;B. Walking;C. Lifting;D. Carrying;E. Rest;G. Certain positions;H. Overhead reach;K. Home tasks;L. Work tasks   Pain/symptoms eased by C. Rest;H. Cold;I. OTC medication(s)   Progression of symptoms since onset: Other   Pain progression comment pain is improved, numbness/tingling worse   Current / Previous Interventions   Diagnostic Tests: X-ray;MRI   X-ray Results Results   X-ray results DEGENERATIVE DISK DISEASE OF MILD-TO-MODERATE SEVERITY AT THE C5-C6 AND C6-C7 LEVELS.   MRI Results Results   MRI results PROGRESSIVE PARTIAL THICKNESS TEARING OF THE SUPRASPINATUS TENDON, NOW INVOLVING BOTH SUPERFICIAL FIBERS, AS WELL AS ANTERIOR INSERTIONAL FIBERS.  MILD UNDERSURFACE IRREGULARITY OF THE DISTAL ACROMION IS AGAIN SEEN.  MODERATE HYPERTROPHY OF THE ACROMIOCLAVICULAR JOINT IS AGAIN PRESENT.  NO EVIDENCE OF A FULL THICKNESS ROTATOR CUFF INJURY.   Prior Level of Function   Prior Level of Function-Mobility independent   Prior Level of Function-ADLs independent   Current Level of Function   Patient role/employment history A. Employed   Employment Comments RN at [employer]   Fall Risk Screen   Fall screen completed by PT   Per patient - Fall 2 or more times in past year? No   Per patient - Fall with injury in past year? No   Is patient a fall risk? No   Cervical Spine   Posture slight forward head   Cervical Flexion ROM WNL   Cervical Extension ROM WNL   Cervical Right Side Bending ROM limited 50%   Cervical Left Side Bending ROM WNL   Cervical Right Rotation ROM limited 25%   Cervical Left Rotation ROM WNL   Thoracic Extension ROM hypomobility present   Shoulder Shrug  (C2-C4) Strength 5/5   Upper Trapezius Flexibility tension present   Levator Scapula Flexibility tensionion present   Spurling Test positive   Neurological Testing Comments ULTT I had strongest response to testing   ULTT I (Median and Anterior Interosseous) Positive   ULTT II (Median and Musculocutaneous and Axillary) Positive   ULTT III (Radial) Positive   ULTT IV (Ulnar) Positive   Shoulder Objective Findings   Side (if bilateral, select both right and left) Right   Posture slight forward head and rounded shoulders   Cervical Screen (ROM, quadrant) see above   Shoulder ROM Comment Pt reports a combination of pain and just not able to move it further with AROM testing, pain not present with PROM into end rnage   Pec Minor (supine) Flexibility tightness present   Neer's Test positive   Coulter-West Test positive   Shoulder Special Tests Comments reporting mild pain with  special testing   Palpation tension noted through upper trap/LS with knots present as well as significant tissue tension present in rhomboids.  Tender to palpation in distal AC and around greater tuberosity.   Right Shoulder Flexion AROM 133   Right Shoulder Flexion PROM full   Right Shoulder Abduction AROM 108   Right Shoulder Abduction PROM full   Right Shoulder ER AROM 54   Right Shoulder ER PROM full   Right Shoulder IR AROM L1   Right Shoulder IR PROM 36   Right Shoulder Flexion Strength 4/5   Right Shoulder Abduction Strength 4/5 with pain   Right Shoulder ER Strength 4-/5   Right Shoulder IR Strength 4-/5   Planned Therapy Interventions   Planned Therapy Interventions joint mobilization;manual therapy;neuromuscular re-education;ROM;strengthening;stretching   Planned Modality Interventions   Planned Modality Interventions Electrical stimulation;Iontophoresis;Ultrasound;Traction  (US with phonophoresis)   Clinical Impression   Criteria for Skilled Therapeutic Interventions Met yes, treatment indicated   PT Diagnosis R shoulder pain with  radicular symtoms throughout R UE   Influenced by the following impairments increased tissue tension, decreased ROM, decreased strength, positive neurological involvement   Functional limitations due to impairments pain with ADLS, inability to carry out work duties, difficulty sleeping   Clinical Presentation Evolving/Changing   Clinical Presentation Rationale ongoing neurological symptoms post injection with pain relief   Clinical Decision Making (Complexity) Low complexity   Therapy Frequency other (see comments)  (1-2x/week prn)   Predicted Duration of Therapy Intervention (days/wks) 8 weeks   Risk & Benefits of therapy have been explained Yes   Patient, Family & other staff in agreement with plan of care Yes   Clinical Impression Comments Pt presents with R shoulder pain as well as paresthesias, numbness, and poor strength of R UE limiting ability to carry out ADLs and work duties.  Pt likely has some RTC involvement but also a strong neurological involvement as well.     Education Assessment   Preferred Learning Style Listening   Barriers to Learning No barriers   ORTHO GOALS   PT Ortho Eval Goals 1;2;3   Ortho Goal 1   Goal Identifier STG 1   Goal Description Pt to be indepednent and compliant with HEP.   Target Date 08/30/17   Ortho Goal 2   Goal Identifier LTG 1   Goal Description Pt to demonstrat improved AROM of R shoulder to full in all motions to be able to carry out ADLs without difficulty.   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   Target Date 10/11/17   Total Evaluation Time   Total Evaluation Time 35   I certify the need for these services furnished under this plan of treatment and while under my care. (Physician co-signature of this document indicates review and certification of the  therapy plan).      _____________________________     __________________________    ____________  Physician's Signature                 Date               Time

## 2017-08-21 ENCOUNTER — HOSPITAL ENCOUNTER (OUTPATIENT)
Dept: PHYSICAL THERAPY | Facility: HOSPITAL | Age: 42
Setting detail: THERAPIES SERIES
End: 2017-08-21
Attending: ORTHOPAEDIC SURGERY
Payer: COMMERCIAL

## 2017-08-21 PROCEDURE — 97110 THERAPEUTIC EXERCISES: CPT | Mod: GP

## 2017-08-21 PROCEDURE — 97012 MECHANICAL TRACTION THERAPY: CPT | Mod: GP

## 2017-08-21 PROCEDURE — 40000718 ZZHC STATISTIC PT DEPARTMENT ORTHO VISIT

## 2017-08-22 ENCOUNTER — TRANSFERRED RECORDS (OUTPATIENT)
Dept: HEALTH INFORMATION MANAGEMENT | Facility: HOSPITAL | Age: 42
End: 2017-08-22

## 2017-08-24 ENCOUNTER — HOSPITAL ENCOUNTER (OUTPATIENT)
Dept: PHYSICAL THERAPY | Facility: HOSPITAL | Age: 42
Setting detail: THERAPIES SERIES
End: 2017-08-24
Attending: ORTHOPAEDIC SURGERY
Payer: COMMERCIAL

## 2017-08-24 PROCEDURE — 97012 MECHANICAL TRACTION THERAPY: CPT | Mod: GP

## 2017-08-24 PROCEDURE — 40000718 ZZHC STATISTIC PT DEPARTMENT ORTHO VISIT

## 2017-08-24 PROCEDURE — 97110 THERAPEUTIC EXERCISES: CPT | Mod: GP

## 2017-08-28 ENCOUNTER — TELEPHONE (OUTPATIENT)
Dept: FAMILY MEDICINE | Facility: OTHER | Age: 42
End: 2017-08-28

## 2017-08-28 NOTE — TELEPHONE ENCOUNTER
Pt calls he would like to go back to work. The week of 09/04/17. Pt was advised to have surgery per Dr Loaiza. He is going to wait to do this. Pt would like to know if you can fill out a permit for [employer] use? Letter provided.

## 2017-08-28 NOTE — LETTER
August 28, 2017      Jese Joe     [address] [facility] [address]        To Whom It May Concern:      Jese Joe was seen in our clinic. He may return to work on 09/04/17 without restrictions.      Sincerely,        Andrei Philip MD

## 2017-08-28 NOTE — TELEPHONE ENCOUNTER
9:56 AM    Reason for Call: Phone Call    Description: Jese needs a return to work done. Does he have to be seen or can it just be sent to  at [place]. Please call Jese to advise    Was an appointment offered for this call?  No    Preferred method for responding to this message: Phone  What is your phone number [phone]    If we cannot reach you directly, may we leave a detailed response at the number you provided? Yes      Annamaria Espana

## 2017-08-29 ENCOUNTER — OFFICE VISIT (OUTPATIENT)
Dept: CHIROPRACTIC MEDICINE | Facility: OTHER | Age: 42
End: 2017-08-29
Attending: CHIROPRACTOR
Payer: COMMERCIAL

## 2017-08-29 DIAGNOSIS — M99.02 SEGMENTAL AND SOMATIC DYSFUNCTION OF THORACIC REGION: ICD-10-CM

## 2017-08-29 DIAGNOSIS — M99.03 SEGMENTAL AND SOMATIC DYSFUNCTION OF LUMBAR REGION: Primary | ICD-10-CM

## 2017-08-29 DIAGNOSIS — M54.50 ACUTE BILATERAL LOW BACK PAIN WITHOUT SCIATICA: ICD-10-CM

## 2017-08-29 DIAGNOSIS — M99.01 SEGMENTAL AND SOMATIC DYSFUNCTION OF CERVICAL REGION: ICD-10-CM

## 2017-08-29 PROCEDURE — 98941 CHIROPRACT MANJ 3-4 REGIONS: CPT | Mod: AT | Performed by: CHIROPRACTOR

## 2017-08-29 NOTE — MR AVS SNAPSHOT
After Visit Summary   8/29/2017    Jese Joe    MRN: [id number]           Patient Information     Date Of Birth          1975        Visit Information        Provider Department      8/29/2017 3:20 PM Moisés Judd DC  [facility]        Today's Diagnoses     Segmental and somatic dysfunction of lumbar region    -  1    Acute bilateral low back pain without sciatica        Segmental and somatic dysfunction of thoracic region        Segmental and somatic dysfunction of cervical region           Follow-ups after your visit        Your next 10 appointments already scheduled     Aug 31, 2017  3:00 PM CDT   Treatment with Kelly Tornow, PTA   [facility] )    [address] [address]   [phone]            Sep 05, 2017  1:00 PM CDT   Treatment with Kelly Tornow, PTA   [facility] ([facility] )    [address] [address]   [phone]            Sep 07, 2017  1:00 PM CDT   Treatment with Anna Lo, PT   [facility] ([facility] )    [address] [address]   [phone]            Sep 12, 2017  2:30 PM CDT   Treatment with Kelly Tornow, PTA   [facility] ([facility] )    [address] [address]   [phone]            Sep 15, 2017  1:00 PM CDT   Treatment with Anna Lo, PT   [facility] ([facility] )    [address] [address]   [phone]              Who to contact     If you have questions or need follow up information about today's clinic visit or your schedule please contact  [facility] directly at [phone].  Normal or non-critical lab and imaging results will be communicated to you by MyChart, letter or phone within 4 business days after the clinic has received the results. If you do not hear from us within 7 days, please contact the clinic through Scale[employer]  or phone. If you have a critical or abnormal lab result, we will notify you by phone as soon as possible.  Submit refill requests through [employer] or call your pharmacy and they will forward the refill request to us. Please allow 3 business days for your refill to be completed.          Additional Information About Your Visit        D[employer]hart Information     [employer] gives you secure access to your electronic health record. If you see a primary care provider, you can also send messages to your care team and make appointments. If you have questions, please call your primary care clinic.  If you do not have a primary care provider, please call [phone] and they will assist you.        Care EveryWhere ID     This is your Care EveryWhere ID. This could be used by other organizations to access your [place] medical records  [id number]         Blood Pressure from Last 3 Encounters:   08/07/17 120/62   07/31/17 114/72   07/26/17 140/70    Weight from Last 3 Encounters:   08/07/17 179 lb (81.2 kg)   07/31/17 179 lb (81.2 kg)   07/26/17 178 lb (80.7 kg)              We Performed the Following     CHIROPRAC MANIP,SPINAL,3-4 REGIONS        Primary Care Provider Office Phone # Fax #    Andrei Philip [phone]       [address] [address]        Equal Access to Services     BRENNAN TELLO : Hadii aad ku hadasho Soomaali, waaxda luqadaha, qaybta kaalmada adeegyada, waxay robbin hayeri pfeiffer . So [facility] [phone].    ATENCIÓN: Si habla español, tiene a spangler disposición servicios gratuitos de asistencia lingüística. Sergio al [phone].    We comply with applicable federal civil rights laws and [place] laws. We do not discriminate on the basis of race, color, national origin, age, disability sex, sexual orientation or gender identity.            Thank you!     [facility]  [facility]. Our goal is always to provide you  with excellent care. Hearing back from our patients is one way we can continue to improve our services. Please take a few minutes to complete the written survey that you may receive in the mail after your visit with us. Thank you!             Your Updated Medication List - Protect others around you: Learn how to safely use, store and throw away your medicines at www.disposemymeds.org.          This list is accurate as of: 8/29/17 11:59 PM.  Always use your most recent med list.                   Brand Name Dispense Instructions for use Diagnosis    albuterol 108 (90 BASE) MCG/ACT Inhaler    PROAIR HFA/PROVENTIL HFA/VENTOLIN HFA    1 Inhaler    Inhale 2 puffs into the lungs every 4 hours as needed for shortness of breath / dyspnea or wheezing        cyclobenzaprine 10 MG tablet    FLEXERIL    60 tablet    Take 1 tablet (10 mg) by mouth 3 times daily as needed for muscle spasms    Cervicalgia, Neck pain       HYDROcodone-acetaminophen 5-325 MG per tablet    NORCO    60 tablet    TAKE 1 TABLET BY MOUTH EVERY 4-6 HOURS AS NEEDED FOR PAIN    Neck pain       IBUPROFEN PO      Take 800 mg by mouth        ondansetron 4 MG ODT tab    ZOFRAN-ODT    20 tablet    DISSOLVE 1 TABLET IN MOUTH EVERY 6 HOURS AS NEEDED FOR NAUSEA    Nausea       TYLENOL PO      Take 1,000 mg by mouth

## 2017-08-31 ENCOUNTER — HOSPITAL ENCOUNTER (OUTPATIENT)
Dept: PHYSICAL THERAPY | Facility: HOSPITAL | Age: 42
Setting detail: THERAPIES SERIES
End: 2017-08-31
Attending: ORTHOPAEDIC SURGERY
Payer: COMMERCIAL

## 2017-08-31 PROCEDURE — 97012 MECHANICAL TRACTION THERAPY: CPT | Mod: GP

## 2017-08-31 PROCEDURE — 97110 THERAPEUTIC EXERCISES: CPT | Mod: GP

## 2017-08-31 PROCEDURE — 40000718 ZZHC STATISTIC PT DEPARTMENT ORTHO VISIT

## 2017-09-05 ENCOUNTER — HOSPITAL ENCOUNTER (OUTPATIENT)
Dept: PHYSICAL THERAPY | Facility: HOSPITAL | Age: 42
Setting detail: THERAPIES SERIES
End: 2017-09-05
Attending: ORTHOPAEDIC SURGERY
Payer: COMMERCIAL

## 2017-09-05 PROCEDURE — 97012 MECHANICAL TRACTION THERAPY: CPT | Mod: GP

## 2017-09-05 PROCEDURE — 97110 THERAPEUTIC EXERCISES: CPT | Mod: GP

## 2017-09-05 PROCEDURE — 40000718 ZZHC STATISTIC PT DEPARTMENT ORTHO VISIT

## 2017-09-07 ENCOUNTER — HOSPITAL ENCOUNTER (OUTPATIENT)
Dept: PHYSICAL THERAPY | Facility: HOSPITAL | Age: 42
Setting detail: THERAPIES SERIES
End: 2017-09-07
Attending: ORTHOPAEDIC SURGERY
Payer: COMMERCIAL

## 2017-09-07 PROCEDURE — 97012 MECHANICAL TRACTION THERAPY: CPT | Mod: GP

## 2017-09-07 PROCEDURE — 97033 APP MDLTY 1+IONTPHRSIS EA 15: CPT | Mod: GP

## 2017-09-07 PROCEDURE — 40000718 ZZHC STATISTIC PT DEPARTMENT ORTHO VISIT

## 2017-09-11 ENCOUNTER — HOSPITAL ENCOUNTER (OUTPATIENT)
Dept: PHYSICAL THERAPY | Facility: HOSPITAL | Age: 42
Setting detail: THERAPIES SERIES
End: 2017-09-11
Attending: ORTHOPAEDIC SURGERY
Payer: COMMERCIAL

## 2017-09-11 DIAGNOSIS — M54.2 NECK PAIN: ICD-10-CM

## 2017-09-11 PROCEDURE — 40000718 ZZHC STATISTIC PT DEPARTMENT ORTHO VISIT

## 2017-09-11 PROCEDURE — 97033 APP MDLTY 1+IONTPHRSIS EA 15: CPT | Mod: GP

## 2017-09-11 PROCEDURE — 97012 MECHANICAL TRACTION THERAPY: CPT | Mod: GP

## 2017-09-11 NOTE — TELEPHONE ENCOUNTER
Controlled Substance Refill Request for Norco  Problem List Complete:  No     PROVIDER TO CONSIDER COMPLETION OF PROBLEM LIST AND OVERVIEW/CONTROLLED SUBSTANCE AGREEMENT    Last Written Prescription Date:  7.31.17  Last Fill Quantity: 60,   # refills: 0    Last Office Visit with [facility] primary care provider: 8.7.17    Future Office visit:     Controlled substance agreement on file: No.     Processing:  Patient will  in clinic     checked in past 6 months?  No, route to RN

## 2017-09-12 RX ORDER — HYDROCODONE BITARTRATE AND ACETAMINOPHEN 5; 325 MG/1; MG/1
TABLET ORAL
Qty: 60 TABLET | Refills: 0 | Status: SHIPPED | OUTPATIENT
Start: 2017-09-12 | End: 2017-11-22

## 2017-09-12 NOTE — TELEPHONE ENCOUNTER
Patient requests RX to be couriered to [facility].

## 2017-09-13 ENCOUNTER — HOSPITAL ENCOUNTER (OUTPATIENT)
Dept: PHYSICAL THERAPY | Facility: HOSPITAL | Age: 42
Setting detail: THERAPIES SERIES
End: 2017-09-13
Attending: ORTHOPAEDIC SURGERY
Payer: COMMERCIAL

## 2017-09-13 PROCEDURE — 97116 GAIT TRAINING THERAPY: CPT | Mod: GP

## 2017-09-13 PROCEDURE — 97033 APP MDLTY 1+IONTPHRSIS EA 15: CPT | Mod: GP

## 2017-09-13 PROCEDURE — 40000718 ZZHC STATISTIC PT DEPARTMENT ORTHO VISIT

## 2017-09-21 ENCOUNTER — HOSPITAL ENCOUNTER (OUTPATIENT)
Dept: PHYSICAL THERAPY | Facility: HOSPITAL | Age: 42
Setting detail: THERAPIES SERIES
End: 2017-09-21
Attending: ORTHOPAEDIC SURGERY
Payer: COMMERCIAL

## 2017-09-21 PROCEDURE — 97012 MECHANICAL TRACTION THERAPY: CPT | Mod: GP

## 2017-09-21 PROCEDURE — 40000718 ZZHC STATISTIC PT DEPARTMENT ORTHO VISIT

## 2017-09-21 PROCEDURE — 97033 APP MDLTY 1+IONTPHRSIS EA 15: CPT | Mod: GP

## 2017-09-25 ENCOUNTER — HOSPITAL ENCOUNTER (OUTPATIENT)
Dept: PHYSICAL THERAPY | Facility: HOSPITAL | Age: 42
Setting detail: THERAPIES SERIES
End: 2017-09-25
Attending: ORTHOPAEDIC SURGERY
Payer: COMMERCIAL

## 2017-09-25 PROCEDURE — 97012 MECHANICAL TRACTION THERAPY: CPT | Mod: GP

## 2017-09-25 PROCEDURE — 97110 THERAPEUTIC EXERCISES: CPT | Mod: GP

## 2017-09-25 PROCEDURE — 40000718 ZZHC STATISTIC PT DEPARTMENT ORTHO VISIT

## 2017-09-28 ENCOUNTER — HOSPITAL ENCOUNTER (OUTPATIENT)
Dept: PHYSICAL THERAPY | Facility: HOSPITAL | Age: 42
Setting detail: THERAPIES SERIES
End: 2017-09-28
Attending: ORTHOPAEDIC SURGERY
Payer: COMMERCIAL

## 2017-09-28 PROCEDURE — 97110 THERAPEUTIC EXERCISES: CPT | Mod: GP

## 2017-09-28 PROCEDURE — 40000718 ZZHC STATISTIC PT DEPARTMENT ORTHO VISIT

## 2017-09-28 PROCEDURE — 97012 MECHANICAL TRACTION THERAPY: CPT | Mod: GP

## 2017-09-29 ENCOUNTER — OFFICE VISIT (OUTPATIENT)
Dept: CHIROPRACTIC MEDICINE | Facility: OTHER | Age: 42
End: 2017-09-29
Attending: CHIROPRACTOR
Payer: COMMERCIAL

## 2017-09-29 DIAGNOSIS — M99.03 SEGMENTAL AND SOMATIC DYSFUNCTION OF LUMBAR REGION: ICD-10-CM

## 2017-09-29 DIAGNOSIS — M99.01 SEGMENTAL AND SOMATIC DYSFUNCTION OF CERVICAL REGION: Primary | ICD-10-CM

## 2017-09-29 DIAGNOSIS — M54.2 CERVICALGIA: ICD-10-CM

## 2017-09-29 DIAGNOSIS — M99.02 SEGMENTAL AND SOMATIC DYSFUNCTION OF THORACIC REGION: ICD-10-CM

## 2017-09-29 PROCEDURE — 98941 CHIROPRACT MANJ 3-4 REGIONS: CPT | Mod: AT | Performed by: CHIROPRACTOR

## 2017-09-29 NOTE — MR AVS SNAPSHOT
After Visit Summary   9/29/2017    Jese Joe    MRN: [id number]           Patient Information     Date Of Birth          1975        Visit Information        Provider Department      9/29/2017 8:10 AM Moisés Judd DC  [facility]        Today's Diagnoses     Segmental and somatic dysfunction of cervical region    -  1    Cervicalgia        Segmental and somatic dysfunction of lumbar region        Segmental and somatic dysfunction of thoracic region           Follow-ups after your visit        Your next 10 appointments already scheduled     Oct 03, 2017 10:00 AM CDT   Treatment with Kelly Peraza, PTA   [facility] )    [address] [address]   [phone]            Oct 06, 2017  7:30 AM CDT   Treatment with Anna Lo, PT   [facility] ([facility] )    [address] [address]   [phone]            Oct 11, 2017  8:00 AM CDT   Treatment with Anna Lo, PT   [facility] )    [address] [address]   [phone]            Oct 13, 2017  1:30 PM CDT   Treatment with Anna Lo, PT   [facility] ([facility] )    [address] [address]   [phone]              Who to contact     If you have questions or need follow up information about today's clinic visit or your schedule please contact  [facility] directly at [phone].  Normal or non-critical lab and imaging results will be communicated to you by MyChart, letter or phone within 4 business days after the clinic has received the results. If you do not hear from us within 7 days, please contact the clinic through MyChart or phone. If you have a critical or abnormal lab result, we will notify you by phone as soon as possible.  Submit refill requests through [employer] or call your pharmacy and they will forward the refill request  to us. Please allow 3 business days for your refill to be completed.          Additional Information About Your Visit        MyChart Information     [employer] gives you secure access to your electronic health record. If you see a primary care provider, you can also send messages to your care team and make appointments. If you have questions, please call your primary care clinic.  If you do not have a primary care provider, please call [phone] and they will assist you.        Care EveryWhere ID     This is your Care EveryWhere ID. This could be used by other organizations to access your [place] medical records  [id number]         Blood Pressure from Last 3 Encounters:   08/07/17 120/62   07/31/17 114/72   07/26/17 140/70    Weight from Last 3 Encounters:   08/07/17 179 lb (81.2 kg)   07/31/17 179 lb (81.2 kg)   07/26/17 178 lb (80.7 kg)              We Performed the Following     CHIROPRAC MANIP,SPINAL,3-4 REGIONS        Primary Care Provider Office Phone # Fax #    Andrei Philip [phone]       [facility] [address] [facility] [address]        Equal Access to Services     [facility] : Hadii aad ku hadasho Soomaali, waaxda luqadaha, qaybta kaalmada adeegyada, waxay idiin hayaan adealicia connollyaratye lajazmyn ah. So [facility] [phone].    ATENCIÓN: Si habla español, tiene a spangler disposición servicios gratuitos de asistencia lingüística. Llame al [phone].    We comply with applicable federal civil rights laws and [place] laws. We do not discriminate on the basis of race, color, national origin, age, disability, sex, sexual orientation, or gender identity.            Thank you!     Thank you for choosing  [facility]  for your care. Our goal is always to provide you with excellent care. Hearing back from our patients is one way we can continue to improve our services. Please take a few minutes to complete the written survey that you may receive in the mail after your  visit with us. Thank you!             Your Updated Medication List - Protect others around you: Learn how to safely use, store and throw away your medicines at www.disposemymeds.org.          This list is accurate as of: 9/29/17 11:59 PM.  Always use your most recent med list.                   Brand Name Dispense Instructions for use Diagnosis    albuterol 108 (90 BASE) MCG/ACT Inhaler    PROAIR HFA/PROVENTIL HFA/VENTOLIN HFA    1 Inhaler    Inhale 2 puffs into the lungs every 4 hours as needed for shortness of breath / dyspnea or wheezing        cyclobenzaprine 10 MG tablet    FLEXERIL    60 tablet    Take 1 tablet (10 mg) by mouth 3 times daily as needed for muscle spasms    Cervicalgia, Neck pain       HYDROcodone-acetaminophen 5-325 MG per tablet    NORCO    60 tablet    TAKE 1 TABLET BY MOUTH EVERY 4-6 HOURS AS NEEDED FOR PAIN    Neck pain       IBUPROFEN PO      Take 800 mg by mouth        ondansetron 4 MG ODT tab    ZOFRAN-ODT    20 tablet    DISSOLVE 1 TABLET IN MOUTH EVERY 6 HOURS AS NEEDED FOR NAUSEA    Nausea       TYLENOL PO      Take 1,000 mg by mouth

## 2017-10-03 NOTE — PROGRESS NOTES
Subjective Finding:    Chief compalint: Patient presents with:  Back Pain  Neck Pain: stiffness in c spine with ROM  , Pain Scale: 5/10, Intensity: dull and ache, Duration: 2 weeks, Radiating: no.    Date of injury:     Activities that the pain restricts:   Home/household/hobbies/social activities: yes.  Work duties: no.  Sleep: no.  Makes symptoms better: rest.  Makes symptoms worse: activity and lumbar flexion.  Have you seen anyone else for the symptoms? No.  Work related: no.  Automobile related injury: no.    Objective and Assessment:    Posture Analysis:   High shoulder: .  Head tilt: .  High iliac crest: .  Head carriage: neutral.  Thoracic Kyphosis: neutral.  Lumbar Lordosis: forward.    Lumbar Range of Motion: extension decreased.  Cervical Range of Motion: .  Thoracic Range of Motion: .  Extremity Range of Motion: .    Palpation:   Quad lumb: bilateral, referred pain: no    Segmental dysfunction pre-treatment and treatment area: C3, T6, T7, L3, L4 and L5.    Assessment post-treatment:  Cervical: .  Thoracic: ROM increased.  Lumbar: ROM increased.    Comments: .      Complicating Factors: .    Plan / Procedure:    Treatment plan: PRN.  Instructed patient: stretch as instructed at visit.  Short term goals: reduce pain and increase ROM.  Long term goals: restore normal function.  Prognosis: excellent.

## 2017-10-11 ENCOUNTER — HOSPITAL ENCOUNTER (OUTPATIENT)
Dept: PHYSICAL THERAPY | Facility: HOSPITAL | Age: 42
Setting detail: THERAPIES SERIES
End: 2017-10-11
Attending: ORTHOPAEDIC SURGERY
Payer: COMMERCIAL

## 2017-10-11 PROCEDURE — 40000718 ZZHC STATISTIC PT DEPARTMENT ORTHO VISIT

## 2017-10-11 PROCEDURE — 97110 THERAPEUTIC EXERCISES: CPT | Mod: GP

## 2017-10-11 PROCEDURE — 97012 MECHANICAL TRACTION THERAPY: CPT | Mod: GP

## 2017-10-16 ENCOUNTER — OFFICE VISIT (OUTPATIENT)
Dept: CHIROPRACTIC MEDICINE | Facility: OTHER | Age: 42
End: 2017-10-16
Attending: CHIROPRACTOR
Payer: COMMERCIAL

## 2017-10-16 DIAGNOSIS — M99.03 SEGMENTAL AND SOMATIC DYSFUNCTION OF LUMBAR REGION: ICD-10-CM

## 2017-10-16 DIAGNOSIS — M54.2 CERVICALGIA: ICD-10-CM

## 2017-10-16 DIAGNOSIS — M99.01 SEGMENTAL AND SOMATIC DYSFUNCTION OF CERVICAL REGION: Primary | ICD-10-CM

## 2017-10-16 DIAGNOSIS — M99.02 SEGMENTAL AND SOMATIC DYSFUNCTION OF THORACIC REGION: ICD-10-CM

## 2017-10-16 PROCEDURE — 98941 CHIROPRACT MANJ 3-4 REGIONS: CPT | Mod: AT | Performed by: CHIROPRACTOR

## 2017-10-16 NOTE — MR AVS SNAPSHOT
After Visit Summary   10/16/2017    Jese Joe    MRN: [id number]           Patient Information     Date Of Birth          1975        Visit Information        Provider Department      10/16/2017 4:10 PM Moisés Judd DC  [facility]        Today's Diagnoses     Segmental and somatic dysfunction of cervical region    -  1    Cervicalgia        Segmental and somatic dysfunction of lumbar region        Segmental and somatic dysfunction of thoracic region           Follow-ups after your visit        Who to contact     If you have questions or need follow up information about today's clinic visit or your schedule please contact  [facility] directly at [phone].  Normal or non-critical lab and imaging results will be communicated to you by [employer], letter or phone within 4 business days after the clinic has received the results. If you do not hear from us within 7 days, please contact the clinic through [employer] or phone. If you have a critical or abnormal lab result, we will notify you by phone as soon as possible.  Submit refill requests through [employer] or call your pharmacy and they will forward the refill request to us. Please allow 3 business days for your refill to be completed.          Additional Information About Your Visit        MyChart Information     [employer] gives you secure access to your electronic health record. If you see a primary care provider, you can also send messages to your care team and make appointments. If you have questions, please call your primary care clinic.  If you do not have a primary care provider, please call [phone] and they will assist you.        Care EveryWhere ID     This is your Care EveryWhere ID. This could be used by other organizations to access your [place] medical records  [id number]         Blood Pressure from Last 3 Encounters:   08/07/17 120/62   07/31/17 114/72   07/26/17 140/70    Weight from Last 3  Encounters:   08/07/17 179 lb (81.2 kg)   07/31/17 179 lb (81.2 kg)   07/26/17 178 lb (80.7 kg)              We Performed the Following     CHIROPRAC MANIP,SPINAL,3-4 REGIONS        Primary Care Provider Office Phone # Fax #    Andrei Philip [phone]       [facility] [address]  [facility] [address]        Equal Access to Services     [facility] : Hadii aad ku hadasho Soomaali, waaxda luqadaha, qaybta kaalmada adeegyada, waxay idiin hayaan adeeg kharash la'aan . So [facility] [phone].    ATENCIÓN: Si indrala espgene, tiene a spangler disposición servicios gratuitos de asistencia lingüística. [facility] [phone].    We comply with applicable federal civil rights laws and [place] laws. We do not discriminate on the basis of race, color, national origin, age, disability, sex, sexual orientation, or gender identity.            Thank you!     Thank you for choosing  [facility]  for your care. Our goal is always to provide you with excellent care. Hearing back from our patients is one way we can continue to improve our services. Please take a few minutes to complete the written survey that you may receive in the mail after your visit with us. Thank you!             Your Updated Medication List - Protect others around you: Learn how to safely use, store and throw away your medicines at www.disposemymeds.org.          This list is accurate as of: 10/16/17 11:59 PM.  Always use your most recent med list.                   Brand Name Dispense Instructions for use Diagnosis    albuterol 108 (90 BASE) MCG/ACT Inhaler    PROAIR HFA/PROVENTIL HFA/VENTOLIN HFA    1 Inhaler    Inhale 2 puffs into the lungs every 4 hours as needed for shortness of breath / dyspnea or wheezing        cyclobenzaprine 10 MG tablet    FLEXERIL    60 tablet    Take 1 tablet (10 mg) by mouth 3 times daily as needed for muscle spasms    Cervicalgia, Neck pain       HYDROcodone-acetaminophen 5-325 MG per tablet     NORCO    60 tablet    TAKE 1 TABLET BY MOUTH EVERY 4-6 HOURS AS NEEDED FOR PAIN    Neck pain       IBUPROFEN PO      Take 800 mg by mouth        ondansetron 4 MG ODT tab    ZOFRAN-ODT    20 tablet    DISSOLVE 1 TABLET IN MOUTH EVERY 6 HOURS AS NEEDED FOR NAUSEA    Nausea       TYLENOL PO      Take 1,000 mg by mouth

## 2017-10-25 NOTE — PROGRESS NOTES
Subjective Finding:    Chief compalint: Patient presents with:  Neck Pain: stiffness with dec ROM  Back Pain  , Pain Scale: 5/10, Intensity: dull and ache, Duration: 2 weeks, Radiating: no.    Date of injury:     Activities that the pain restricts:   Home/household/hobbies/social activities: yes.  Work duties: no.  Sleep: no.  Makes symptoms better: rest.  Makes symptoms worse: activity and lumbar flexion.  Have you seen anyone else for the symptoms? No.  Work related: no.  Automobile related injury: no.    Objective and Assessment:    Posture Analysis:   High shoulder: .  Head tilt: .  High iliac crest: .  Head carriage: neutral.  Thoracic Kyphosis: neutral.  Lumbar Lordosis: forward.    Lumbar Range of Motion: extension decreased.  Cervical Range of Motion: .  Thoracic Range of Motion: .  Extremity Range of Motion: .    Palpation:   Quad lumb: bilateral, referred pain: no    Segmental dysfunction pre-treatment and treatment area: C3, T6, T7, L3, L4 and L5.    Assessment post-treatment:  Cervical: .  Thoracic: ROM increased.  Lumbar: ROM increased.    Comments: .      Complicating Factors: .    Plan / Procedure:    Treatment plan: PRN.  Instructed patient: stretch as instructed at visit.  Short term goals: reduce pain and increase ROM.  Long term goals: restore normal function.  Prognosis: excellent.

## 2017-11-22 DIAGNOSIS — M54.2 NECK PAIN: ICD-10-CM

## 2017-11-24 NOTE — TELEPHONE ENCOUNTER
HYDROCODONE-APAP       Last Written Prescription Date: 9/12/17  Last Fill Quantity: 60,  # refills: 0   Last Office Visit with G, [facility] or [facility] prescribing provider: 8/7/17

## 2017-11-27 RX ORDER — HYDROCODONE BITARTRATE AND ACETAMINOPHEN 5; 325 MG/1; MG/1
TABLET ORAL
Qty: 60 TABLET | Refills: 0 | Status: SHIPPED | OUTPATIENT
Start: 2017-11-27 | End: 2018-02-20

## 2017-11-27 NOTE — TELEPHONE ENCOUNTER
Controlled Substance Refill Request for Norco  Problem List Complete:  No     PROVIDER TO CONSIDER COMPLETION OF PROBLEM LIST AND OVERVIEW/CONTROLLED SUBSTANCE AGREEMENT    Last Written Prescription Date:  09/12/17  Last Fill Quantity: 60,   # refills: 0    Last Office Visit with [facility] primary care provider: 08/07/17    Future Office visit:     Controlled substance agreement on file: No.     Processing:  Staff will hand deliver Rx to on-site pharmacy

## 2018-01-07 ENCOUNTER — HOSPITAL ENCOUNTER (EMERGENCY)
Facility: HOSPITAL | Age: 43
Discharge: HOME | End: 2018-01-07
Attending: NURSE PRACTITIONER | Admitting: NURSE PRACTITIONER

## 2018-01-07 VITALS
OXYGEN SATURATION: 98 % | DIASTOLIC BLOOD PRESSURE: 76 MMHG | TEMPERATURE: 97 F | HEART RATE: 74 BPM | SYSTOLIC BLOOD PRESSURE: 132 MMHG | RESPIRATION RATE: 16 BRPM

## 2018-01-07 DIAGNOSIS — Z77.21 HISTORY OF POTENTIALLY HAZARDOUS BODY FLUID EXPOSURE: ICD-10-CM

## 2018-01-07 PROCEDURE — G0463 HOSPITAL OUTPT CLINIC VISIT: HCPCS

## 2018-01-07 PROCEDURE — 99212 OFFICE O/P EST SF 10 MIN: CPT | Performed by: NURSE PRACTITIONER

## 2018-01-07 ASSESSMENT — ENCOUNTER SYMPTOMS
FEVER: 0
CONSTITUTIONAL NEGATIVE: 1
COUGH: 0

## 2018-01-07 NOTE — ED AVS SNAPSHOT
[facility]    [address]    [facility] [address]    Phone:  [phone]                                       Jese Joe   MRN: [id number]    Department:  [facility]   Date of Visit:  1/7/2018           Patient Information     Date Of Birth          1975        Your diagnoses for this visit were:     History of potentially hazardous body fluid exposure        You were seen by Ryanne Philip NP.      Follow-up Information     Follow up with Andrei Philip MD In 3 days.    Specialty:  Family Practice    Why:  for re-evaluation    Contact information:     [facility]  [address] [place] [address] [phone]          Follow up with [facility].    Specialty:  EMERGENCY MEDICINE    Why:  If symptoms worsen    Contact information:    [address] [address] [phone]    Additional information:    From [place] Area: Take [address]. Turn left at [address]. Turn left at the first stoplight on [address]. At the first stop sign, take a right onto [address]. Take a left into the parking lot and continue through until you reach the North enterance of the building.       From [place]: Take [address]. Take the [address] ramp towards [place]. Turn left onto [address]. Take a slight right onto [address]. Turn left at the first stoplight on [address]. At the first stop sign, take a right onto [address]. Take a left into the parking lot and continue through until you reach the North enterance of the building.       From [place]: Take [address]. Take a right at [address]. At the first stop sign, take a right onto [address]. Take a left into the parking lot and continue through until you reach the North enterance of the building.         Discharge Instructions       Decisions regarding administration of prophylaxis  depend on the degree of exposure and the immune status of the contact. Significant exposures include close family members, patients who are kissing or sleeping in the same bed, and caretakers who spends many hours daily with an infected individual. Prophylaxis is also warranted for contacts who are immunosuppressed, pregnant, have had recent surgery, or have any type of open wound.  The regimen for prophylaxis consists of penicillin  mg orally per day for five to seven days.  The risk of a close contact developing a secondary case of GAS invasive infection such as bacteremia or necrotizing fasciitis is increased about 50-fold    Return tomorrow for the remaining prescription.              Review of your medicines      Our records show that you are taking the medicines listed below. If these are incorrect, please call your family doctor or clinic.        Dose / Directions Last dose taken    albuterol 108 (90 BASE) MCG/ACT Inhaler   Commonly known as:  PROAIR HFA/PROVENTIL HFA/VENTOLIN HFA   Dose:  2 puff   Quantity:  1 Inhaler        Inhale 2 puffs into the lungs every 4 hours as needed for shortness of breath / dyspnea or wheezing   Refills:  0        cyclobenzaprine 10 MG tablet   Commonly known as:  FLEXERIL   Dose:  10 mg   Quantity:  60 tablet        Take 1 tablet (10 mg) by mouth 3 times daily as needed for muscle spasms   Refills:  0        HYDROcodone-acetaminophen 5-325 MG per tablet   Commonly known as:  NORCO   Quantity:  60 tablet        TAKE 1 TABLET BY MOUTH EVERY 4 TO 6 HOURS AS NEEDED FOR PAIN   Refills:  0        IBUPROFEN PO   Dose:  800 mg        Take 800 mg by mouth   Refills:  0        ondansetron 4 MG ODT tab   Commonly known as:  ZOFRAN-ODT   Quantity:  20 tablet        DISSOLVE 1 TABLET IN MOUTH EVERY 6 HOURS AS NEEDED FOR NAUSEA   Refills:  0        TYLENOL PO   Dose:  1000 mg        Take 1,000 mg by mouth   Refills:  0                Orders Needing Specimen Collection     None       Pending Results     No orders found from 1/5/2018 to 1/8/2018.            Pending Culture Results     No orders found from 1/5/2018 to 1/8/2018.            Thank you for choosing [facility]       Thank you for choosing [facility] for your care. Our goal is always to provide you with excellent care. Hearing back from our patients is one way we can continue to improve our services. Please take a few minutes to complete the written survey that you may receive in the mail after you visit with us. Thank you!        [employer] Information     [employer] gives you secure access to your electronic health record. If you see a primary care provider, you can also send messages to your care team and make appointments. If you have questions, please call your primary care clinic.  If you do not have a primary care provider, please call [phone] and they will assist you.        Care EveryWhere ID     This is your Care EveryWhere ID. This could be used by other organizations to access your [place] medical records  [id number]        Equal Access to Services     BRENNAN TELLO AH: Yulissa burnso Soeri, waaxda gillian, qaalejandrata kaalmakurt deal, lazaro pfeiffer . So [facility] [phone].    ATENCIÓN: Si habla español, tiene a spangler disposición servicios gratuitos de asistencia lingüística. Llame al [phone].    We comply with applicable federal civil rights laws and [place] laws. We do not discriminate on the basis of race, color, national origin, age, disability, sex, sexual orientation, or gender identity.            After Visit Summary       This is your record. Keep this with you and show to your community pharmacist(s) and doctor(s) at your next visit.

## 2018-01-07 NOTE — ED AVS SNAPSHOT
[facility]    [address] [address]    Phone:  [phone]                                       Jese Joe   MRN: [id number]    Department:  [facility]   Date of Visit:  1/7/2018           After Visit Summary Signature Page     I have received my discharge instructions, and my questions have been answered. I have discussed any challenges I see with this plan with the nurse or doctor.    ..........................................................................................................................................  Patient/Patient Representative Signature      ..........................................................................................................................................  Patient Representative Print Name and Relationship to Patient    ..................................................               ................................................  Date                                            Time    ..........................................................................................................................................  Reviewed by Signature/Title    ...................................................              ..............................................  Date                                                            Time

## 2018-01-08 NOTE — ED PROVIDER NOTES
History     Chief Complaint   Patient presents with     Body Fluid Exposure     exposed to infectious Group A strep at work     The history is provided by the patient. No  was used.     Jese Joe is a 42 year old male who presents for body fluid exposure that occurred 2 nights in the ED. No illness since. Has a slight sinus drainage for a week, no fever, slight cough. 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 Was advised to present for prophylactic treatment by [facility], Dr. Krishna.    Problem List:    Patient Active Problem List    Diagnosis Date Noted     ACP (advance care planning) 07/31/2017     Priority: Medium     Advance Care Planning 7/31/2017: ACP Review of Chart / Resources Provided:  Reviewed chart for advance care plan.  Jese Joe has no plan or code status on file. Discussed available resources and provided with information.   Added by Indira Santoro                  Past Medical History:    History reviewed. No pertinent past medical history.    Past Surgical History:    Past Surgical History:   Procedure Laterality Date     NO SURGICAL HISTORY[         Family History:    Family History   Problem Relation Age of Onset     DIABETES Maternal Grandfather      HEART DISEASE Paternal Grandfather      MI       Social History:  Marital Status:   [2]  Social History   Substance Use Topics     Smoking status: Former Smoker     Packs/day: 1.00     Years: 4.00     Types: Cigarettes     Quit date: 5/23/2009     Smokeless tobacco: Never Used     Alcohol use Yes      Comment: occasionally        Medications:      HYDROcodone-acetaminophen (NORCO) 5-325 MG per tablet   cyclobenzaprine (FLEXERIL) 10 MG tablet   albuterol (PROAIR HFA/PROVENTIL HFA/VENTOLIN HFA) 108 (90 BASE) MCG/ACT Inhaler   Acetaminophen (TYLENOL PO)   ondansetron (ZOFRAN-ODT) 4 MG disintegrating tablet   IBUPROFEN PO         Review of Systems    Constitutional: Negative.  Negative for fever.   HENT: Negative.    Respiratory: Negative for cough.        Physical Exam   BP: 132/76  Pulse: 74  Temp: 97  F (36.1  C)  Resp: 16  SpO2: 98 %      Physical Exam   Constitutional: He appears well-developed and well-nourished. No distress.   HENT:   Head: Normocephalic and atraumatic.   Right Ear: Tympanic membrane and external ear normal.   Left Ear: Tympanic membrane and external ear normal.   Nose: Nose normal.   Mouth/Throat: Uvula is midline, oropharynx is clear and moist and mucous membranes are normal. No oropharyngeal exudate.   Eyes: Conjunctivae and lids are normal. Right eye exhibits no discharge. Left eye exhibits no discharge. No scleral icterus.   Neck: Normal range of motion. Neck supple.   Cardiovascular: Normal rate, regular rhythm and normal heart sounds.    Pulmonary/Chest: Effort normal and breath sounds normal. No respiratory distress. He has no decreased breath sounds. He has no wheezes.   Skin: Skin is warm, dry and intact. He is not diaphoretic.   Nursing note and vitals reviewed.      ED Course     ED Course     Procedures     Labs Ordered and Resulted from Time of ED Arrival Up to the Time of Departure from the ED - No data to display    Assessments & Plan (with Medical Decision Making)     I have reviewed the nursing notes.  I have reviewed the findings, diagnosis, plan and need for follow up with the patient.    Take prescription medications as directed.  Return to ED/UC if symptoms worsen or concerns develop  Follow up with PCP for re-evaluation in 3 days or sooner as needed.  Dispensed PenVK 500 mg tablet which was given to nursing staff by supervisor.   Prescription written for PenVK 250 mg tablets PO daily x 5 days, sent via tube system down to pharmacy.     Final diagnoses:   History of potentially hazardous body fluid exposure       1/7/2018   [facility]     Ryanne Philip NP  01/07/18 [id number]

## 2018-01-08 NOTE — DISCHARGE INSTRUCTIONS
Decisions regarding administration of prophylaxis depend on the degree of exposure and the immune status of the contact. Significant exposures include close family members, patients who are kissing or sleeping in the same bed, and caretakers who spends many hours daily with an infected individual. Prophylaxis is also warranted for contacts who are immunosuppressed, pregnant, have had recent surgery, or have any type of open wound.  The regimen for prophylaxis consists of penicillin  mg orally per day for five to seven days.  The risk of a close contact developing a secondary case of GAS invasive infection such as bacteremia or necrotizing fasciitis is increased about 50-fold    Return tomorrow for the remaining prescription.

## 2018-01-11 ENCOUNTER — HOSPITAL ENCOUNTER (EMERGENCY)
Facility: HOSPITAL | Age: 43
Discharge: HOME | End: 2018-01-11
Attending: NURSE PRACTITIONER | Admitting: NURSE PRACTITIONER
Payer: OTHER MISCELLANEOUS

## 2018-01-11 VITALS
DIASTOLIC BLOOD PRESSURE: 78 MMHG | OXYGEN SATURATION: 98 % | TEMPERATURE: 97.5 F | RESPIRATION RATE: 16 BRPM | HEART RATE: 78 BPM | SYSTOLIC BLOOD PRESSURE: 136 MMHG

## 2018-01-11 DIAGNOSIS — R07.0 THROAT PAIN: ICD-10-CM

## 2018-01-11 LAB
DEPRECATED S PYO AG THROAT QL EIA: NORMAL
SPECIMEN SOURCE: NORMAL

## 2018-01-11 PROCEDURE — 87081 CULTURE SCREEN ONLY: CPT | Performed by: FAMILY MEDICINE

## 2018-01-11 PROCEDURE — 25000125 ZZHC RX 250: Performed by: NURSE PRACTITIONER

## 2018-01-11 PROCEDURE — G0463 HOSPITAL OUTPT CLINIC VISIT: HCPCS

## 2018-01-11 PROCEDURE — 99213 OFFICE O/P EST LOW 20 MIN: CPT | Performed by: NURSE PRACTITIONER

## 2018-01-11 PROCEDURE — 87880 STREP A ASSAY W/OPTIC: CPT | Performed by: FAMILY MEDICINE

## 2018-01-11 RX ORDER — PREDNISONE 20 MG/1
40 TABLET ORAL DAILY
Qty: 8 TABLET | Refills: 0 | Status: SHIPPED | OUTPATIENT
Start: 2018-01-11 | End: 2018-01-15

## 2018-01-11 RX ORDER — PREDNISONE 20 MG/1
40 TABLET ORAL ONCE
Status: COMPLETED | OUTPATIENT
Start: 2018-01-11 | End: 2018-01-11

## 2018-01-11 RX ADMIN — PREDNISONE 40 MG: 20 TABLET ORAL at 22:34

## 2018-01-11 NOTE — ED AVS SNAPSHOT
[facility]    [address] [address]    Phone:  [phone]                                       Jese Joe   MRN: [id number]    Department:  [facility]   Date of Visit:  1/11/2018           After Visit Summary Signature Page     I have received my discharge instructions, and my questions have been answered. I have discussed any challenges I see with this plan with the nurse or doctor.    ..........................................................................................................................................  Patient/Patient Representative Signature      ..........................................................................................................................................  Patient Representative Print Name and Relationship to Patient    ..................................................               ................................................  Date                                            Time    ..........................................................................................................................................  Reviewed by Signature/Title    ...................................................              ..............................................  Date                                                            Time

## 2018-01-11 NOTE — ED AVS SNAPSHOT
[facility]    [address]    [facility] [address]    Phone:  [phone]                                       Jese Joe   MRN: [id number]    Department:  [facility]   Date of Visit:  1/11/2018           Patient Information     Date Of Birth          1975        Your diagnoses for this visit were:     Throat pain        You were seen by Adilene Fuller NP.      Follow-up Information     Follow up with Andrei Philip MD.    Specialty:  Family Practice    Why:  As needed, If symptoms worsen    Contact information:     [facility]  [address] [place] [address] [phone]          Follow up with [facility].    Specialty:  EMERGENCY MEDICINE    Why:  As needed, If symptoms worsen    Contact information:    [address]  [facility] [place] [facility] [address] [phone]    Additional information:    From [facility]: Take [address]. Turn left at [address]. Turn left at the first stoplight on [address]. At the first stop sign, take a right onto [address]. Take a left into the parking lot and continue through until you reach the North enterance of the building.       From [place]: Take [address]. Take the [address] ramp towards [place]. Turn left onto [address]. Take a slight right onto [address]. Turn left at the first stoplight on [address]. At the first stop sign, take a right onto [address]. Take a left into the parking lot and continue through until you reach the North enterance of the building.       From [place]: Take [address]. Take a right at [address]. At the first stop sign, take a right onto [address]. Take a left into the parking lot and continue through until you reach the North enterance of the building.         Discharge Instructions       Take Prednisone as ordered. Take with food. Take in the morning.   Gargle salt  water.   Use throat lozenges.   Rapid strep is negative. If culture is positive, we will call you.   Follow up with PCP with any increase in symptoms or concerns.   Return to urgent care or emergency department with any increase in symptoms or concerns.     Discharge References/Attachments     SORE THROAT, WHEN YOU HAVE A (ENGLISH)    SORE THROATS, SELF-CARE FOR (ENGLISH)         Review of your medicines      START taking        Dose / Directions Last dose taken    predniSONE 20 MG tablet   Commonly known as:  DELTASONE   Dose:  40 mg   Quantity:  8 tablet        Take 2 tablets (40 mg) by mouth daily for 4 days   Refills:  0          Our records show that you are taking the medicines listed below. If these are incorrect, please call your family doctor or clinic.        Dose / Directions Last dose taken    albuterol 108 (90 BASE) MCG/ACT Inhaler   Commonly known as:  PROAIR HFA/PROVENTIL HFA/VENTOLIN HFA   Dose:  2 puff   Quantity:  1 Inhaler        Inhale 2 puffs into the lungs every 4 hours as needed for shortness of breath / dyspnea or wheezing   Refills:  0        cyclobenzaprine 10 MG tablet   Commonly known as:  FLEXERIL   Dose:  10 mg   Quantity:  60 tablet        Take 1 tablet (10 mg) by mouth 3 times daily as needed for muscle spasms   Refills:  0        HYDROcodone-acetaminophen 5-325 MG per tablet   Commonly known as:  NORCO   Quantity:  60 tablet        TAKE 1 TABLET BY MOUTH EVERY 4 TO 6 HOURS AS NEEDED FOR PAIN   Refills:  0        IBUPROFEN PO   Dose:  800 mg        Take 800 mg by mouth   Refills:  0        ondansetron 4 MG ODT tab   Commonly known as:  ZOFRAN-ODT   Quantity:  20 tablet        DISSOLVE 1 TABLET IN MOUTH EVERY 6 HOURS AS NEEDED FOR NAUSEA   Refills:  0        TYLENOL PO   Dose:  1000 mg        Take 1,000 mg by mouth   Refills:  0                Prescriptions were sent or printed at these locations (1 Prescription)                   [facility] - [address]  [place]   [address] [place] [address]    Telephone:  [phone]   Fax:  [phone]   Hours:                  E-Prescribed (1 of 1)         predniSONE (DELTASONE) 20 MG tablet                Procedures and tests performed during your visit     Beta strep group A culture    Rapid strep screen      Orders Needing Specimen Collection     None      Pending Results     Date and Time Order Name Status Description    1/11/2018 2051 Beta strep group A culture In process             Pending Culture Results     Date and Time Order Name Status Description    1/11/2018 2051 Beta strep group A culture In process             Thank you for choosing [facility]       Thank you for choosing [facility] for your care. Our goal is always to provide you with excellent care. Hearing back from our patients is one way we can continue to improve our services. Please take a few minutes to complete the written survey that you may receive in the mail after you visit with us. Thank you!        [facility] Information     [facility] gives you secure access to your electronic health record. If you see a primary care provider, you can also send messages to your care team and make appointments. If you have questions, please call your primary care clinic.  If you do not have a primary care provider, please call [phone] and they will assist you.        Care EveryWhere ID     This is your Care EveryWhere ID. This could be used by other organizations to access your [facility] medical records  [id number]        Equal Access to Services     BRENNAN TELLO : Hadii tacos stephens Soeri, waaxda luqadaha, qaybta kaalmada adechen, lazaro pepe. So [facility] [phone].    ATENCIÓN: Si habla español, tiene a spangler disposición servicios gratuitos de asistencia lingüística. Llame al [phone].    We comply with applicable federal civil rights laws and [place] laws. We do not discriminate on the basis of race, color, national origin,  age, disability, sex, sexual orientation, or gender identity.            After Visit Summary       This is your record. Keep this with you and show to your community pharmacist(s) and doctor(s) at your next visit.

## 2018-01-13 ENCOUNTER — HOSPITAL ENCOUNTER (EMERGENCY)
Facility: HOSPITAL | Age: 43
Discharge: HOME | End: 2018-01-13
Attending: PHYSICIAN ASSISTANT | Admitting: PHYSICIAN ASSISTANT
Payer: COMMERCIAL

## 2018-01-13 VITALS
RESPIRATION RATE: 18 BRPM | DIASTOLIC BLOOD PRESSURE: 86 MMHG | TEMPERATURE: 96 F | OXYGEN SATURATION: 98 % | SYSTOLIC BLOOD PRESSURE: 139 MMHG

## 2018-01-13 DIAGNOSIS — J02.0 ACUTE STREPTOCOCCAL PHARYNGITIS: ICD-10-CM

## 2018-01-13 LAB
BACTERIA SPEC CULT: ABNORMAL
BACTERIA SPEC CULT: ABNORMAL
SPECIMEN SOURCE: ABNORMAL

## 2018-01-13 PROCEDURE — 96372 THER/PROPH/DIAG INJ SC/IM: CPT

## 2018-01-13 PROCEDURE — 99213 OFFICE O/P EST LOW 20 MIN: CPT | Performed by: PHYSICIAN ASSISTANT

## 2018-01-13 PROCEDURE — G0463 HOSPITAL OUTPT CLINIC VISIT: HCPCS | Mod: 25

## 2018-01-13 PROCEDURE — 25000128 H RX IP 250 OP 636: Performed by: PHYSICIAN ASSISTANT

## 2018-01-13 RX ORDER — CEFDINIR 300 MG/1
300 CAPSULE ORAL 2 TIMES DAILY
Qty: 20 CAPSULE | Refills: 0 | Status: SHIPPED | OUTPATIENT
Start: 2018-01-13 | End: 2018-01-13

## 2018-01-13 RX ADMIN — PENICILLIN G BENZATHINE 1.2 MILLION UNITS: 1200000 INJECTION, SUSPENSION INTRAMUSCULAR at 19:08

## 2018-01-13 ASSESSMENT — ENCOUNTER SYMPTOMS
SINUS PRESSURE: 0
SINUS PAIN: 0
NUMBNESS: 0
NAUSEA: 0
TROUBLE SWALLOWING: 0
DIARRHEA: 0
RHINORRHEA: 0
STRIDOR: 0
DYSURIA: 0
VOMITING: 0
NECK PAIN: 0
WHEEZING: 0
HEADACHES: 0
CHILLS: 1
FEVER: 0
EYE REDNESS: 0
LIGHT-HEADEDNESS: 0
VOMITING: 0
NAUSEA: 0
APPETITE CHANGE: 0
CARDIOVASCULAR NEGATIVE: 1
ACTIVITY CHANGE: 0
VOICE CHANGE: 0
DIARRHEA: 0
SHORTNESS OF BREATH: 0
TROUBLE SWALLOWING: 0
ABDOMINAL PAIN: 0
FATIGUE: 1
COUGH: 0
APPETITE CHANGE: 0
SINUS PRESSURE: 0
DIZZINESS: 0
PSYCHIATRIC NEGATIVE: 1
FEVER: 0
ABDOMINAL PAIN: 0
SORE THROAT: 1
NECK STIFFNESS: 0
SORE THROAT: 1
VOICE CHANGE: 0
EYE DISCHARGE: 0
PSYCHIATRIC NEGATIVE: 1
COUGH: 0
CHILLS: 1

## 2018-01-13 NOTE — ED AVS SNAPSHOT
[facility]    [address] [address]    Phone:  [phone]                                       Jese Joe   MRN: [id number]    Department:  [facility]   Date of Visit:  1/13/2018           After Visit Summary Signature Page     I have received my discharge instructions, and my questions have been answered. I have discussed any challenges I see with this plan with the nurse or doctor.    ..........................................................................................................................................  Patient/Patient Representative Signature      ..........................................................................................................................................  Patient Representative Print Name and Relationship to Patient    ..................................................               ................................................  Date                                            Time    ..........................................................................................................................................  Reviewed by Signature/Title    ...................................................              ..............................................  Date                                                            Time

## 2018-01-13 NOTE — PROGRESS NOTES
Strep Culture has come back positive for Group A strep. I have ordered cefdinir 300 mg po BID x 10 days. 0RF. Order sent to [facility] in [place].  I have called and left a voice message for him to call me at Urgent Care.  Haley Simon Certified  Physician Assistant  1/13/2018  1:41 PM  URGENT CARE CLINIC

## 2018-01-14 NOTE — ED PROVIDER NOTES
"  History     Chief Complaint   Patient presents with     Pharyngitis     sore throat     The history is provided by the patient. No  was used.     Jese Joe is a 42 year old male who has 4 days of sore throat, fatigue, chills. Was seen 2 days ago and the Rapid strep was negative. However, Culture came back positive earlier today. I had ordered Cefdinir to be picked up at [facility], but the pt would rather get a Bicillin injection. No n/v/d. Has been warm. No ear/sinus pain/pressure    11Jan 2018  \" He is currently taking a 5 day course of Pen  mg prophalactlly after body fluid exposure while working in the ED on 1-5-18. He was started on Pen VK on 1-7-18. 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 He's taken ibuprofen for symptom management of a sore throat.\"        Review of Systems   Constitutional: Positive for chills and fatigue. Negative for appetite change and fever.   HENT: Positive for congestion and sore throat. Negative for ear pain, sinus pressure, trouble swallowing and voice change.    Eyes: Negative for discharge and redness.   Respiratory: Negative for cough.    Cardiovascular: Negative.    Gastrointestinal: Negative for abdominal pain, diarrhea, nausea and vomiting.   Genitourinary: Negative.    Musculoskeletal: Negative for neck pain and neck stiffness.   Skin: Negative for rash.   Neurological: Negative for dizziness, light-headedness and headaches.   Psychiatric/Behavioral: Negative.        Physical Exam   BP: 139/86  Heart Rate: 81  Temp: 96  F (35.6  C)  Resp: 18  SpO2: 98 %      Physical Exam   Constitutional: He is oriented to person, place, and time. He appears well-developed and well-nourished. No distress.   HENT:   Head: Normocephalic and atraumatic.   Right Ear: External ear normal.   Left Ear: External ear normal.   Mouth/Throat: " Oropharynx is clear and moist.   Bilateral TMs/canals clear/wnl  No sinus TTP     Eyes: Conjunctivae and EOM are normal. Right eye exhibits no discharge. Left eye exhibits no discharge.   Neck: Normal range of motion. Neck supple.   Cardiovascular: Normal rate, regular rhythm and normal heart sounds.    Pulmonary/Chest: Effort normal and breath sounds normal. No respiratory distress.   Abdominal: Soft. Bowel sounds are normal. He exhibits no distension. There is no tenderness.   Neurological: He is alert and oriented to person, place, and time.   Skin: Skin is warm and dry. No rash noted. He is not diaphoretic.   Psychiatric: He has a normal mood and affect.   Nursing note and vitals reviewed.      ED Course     ED Course     Procedures           1/11/18  8:51 PM [id number]    Component Results   Component Collected Lab   Specimen Description 01/11/2018  8:51 PM [facility]   Throat   Culture Micro (Abnormal) 01/11/2018  8:51 PM [facility]   Positive: Beta Hemolytic Streptococcus Group A isolated   Culture Micro 01/11/2018  8:51 PM [facility]     Medications   penicillin G benzathine (BICILLIN L-A) injection 1.2 Million Units (1.2 Million Units Intramuscular Given 1/13/18 1908)     Pt observed x 20 minutes. Pt tolerated well    Assessments & Plan (with Medical Decision Making)     I have reviewed the nursing notes.    I have reviewed the findings, diagnosis, plan and need for follow up with the patient.      Final diagnoses:   Acute streptococcal pharyngitis         Wear mask while at work over next 2 days or per ID policy.  Patient verbally educated and given appropriate education sheets for each of the diagnoses and has no questions.  Take OTC motrin or tylenol as directed on the bottle as needed.  Increase fluids, wash hands often.  Follow up with your provider if symptoms increase or if concerns develop, return to the ER.  Haley Simon Certified   Physician Assistant  1/13/2018  7:07 PM  URGENT CARE CLINIC    1/13/2018   [facility]     Haley Simon PA  01/13/18 1936       Haley Simon PA  01/13/18 1936

## 2018-01-14 NOTE — ED PROVIDER NOTES
History     Chief Complaint   Patient presents with     Pharyngitis     sore throat, recently exposed to strep A     The history is provided by the patient. No  was used.     Jese Joe is a 42 year old male who presents with a sore throat that started 3 days ago. He is currently taking a 5 day course of Pen  mg prophalactlly after body fluid exposure while working in the ED on 1-5-18. He was started on Pen VK on 1-7-18. 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 He's taken ibuprofen for symptom management of a sore throat. Denies any coughing or blood tinged sputum. Denies fever. Positive for chills and night sweats. Eating and drinking well. Bowel and bladder are working well.       Problem List:    Patient Active Problem List    Diagnosis Date Noted     ACP (advance care planning) 07/31/2017     Priority: Medium     Advance Care Planning 7/31/2017: ACP Review of Chart / Resources Provided:  Reviewed chart for advance care plan.  Jese Joe has no plan or code status on file. Discussed available resources and provided with information.   Added by Indira Santoro                  Past Medical History:    No past medical history on file.    Past Surgical History:    Past Surgical History:   Procedure Laterality Date     NO SURGICAL HISTORY[         Family History:    Family History   Problem Relation Age of Onset     DIABETES Maternal Grandfather      HEART DISEASE Paternal Grandfather      MI       Social History:  Marital Status:   [2]  Social History   Substance Use Topics     Smoking status: Former Smoker     Packs/day: 1.00     Years: 4.00     Types: Cigarettes     Quit date: 5/23/2009     Smokeless tobacco: Never Used     Alcohol use Yes      Comment: occasionally        Medications:      cefdinir (OMNICEF) 300 MG capsule   predniSONE (DELTASONE) 20  MG tablet   HYDROcodone-acetaminophen (NORCO) 5-325 MG per tablet   cyclobenzaprine (FLEXERIL) 10 MG tablet   albuterol (PROAIR HFA/PROVENTIL HFA/VENTOLIN HFA) 108 (90 BASE) MCG/ACT Inhaler   Acetaminophen (TYLENOL PO)   IBUPROFEN PO   ondansetron (ZOFRAN-ODT) 4 MG disintegrating tablet         Review of Systems   Constitutional: Positive for chills. Negative for activity change, appetite change and fever.   HENT: Positive for congestion, ear pain and sore throat. Negative for ear discharge, rhinorrhea, sinus pain, sinus pressure, trouble swallowing and voice change.         Right ear pain.    Respiratory: Negative for cough, shortness of breath, wheezing and stridor.    Gastrointestinal: Negative for abdominal pain, diarrhea, nausea and vomiting.   Genitourinary: Negative for dysuria.   Skin: Negative for rash.   Neurological: Negative for numbness.   Psychiatric/Behavioral: Negative.        Physical Exam   BP: 136/78  Pulse: 78  Temp: 97.5  F (36.4  C)  Resp: 16  SpO2: 98 %      Physical Exam   Constitutional: He is oriented to person, place, and time. He appears well-developed and well-nourished. No distress.   HENT:   Head: Normocephalic.   Right Ear: External ear normal.   Left Ear: External ear normal.   Mouth/Throat: Oropharyngeal exudate present.   Oropharynx with erythema and exudate to right side of oropharynx. Tonsils +2. Uvula midline.    Neck: Normal range of motion. Neck supple.   Cardiovascular: Normal rate, regular rhythm and normal heart sounds.    No murmur heard.  Pulmonary/Chest: Effort normal. No respiratory distress. He has no wheezes. He has no rales.   Abdominal: Soft. He exhibits no distension.   Musculoskeletal: Normal range of motion.   Lymphadenopathy:     He has cervical adenopathy.   Neurological: He is alert and oriented to person, place, and time. He exhibits normal muscle tone.   Skin: Skin is warm and dry. No rash noted. He is not diaphoretic.   Psychiatric: He has a normal mood and  affect. His behavior is normal.   Nursing note and vitals reviewed.      ED Course     ED Course     Procedures    Rapid strep negative.     Culture pending.     Medications   predniSONE (DELTASONE) tablet 40 mg (40 mg Oral Given 1/11/18 2234)       Assessments & Plan (with Medical Decision Making)     Discussed plan of care. He verbalized understanding. All questions answered.     I have reviewed the nursing notes.    I have reviewed the findings, diagnosis, plan and need for follow up with the patient.  Discharged in stable condition.     Discharge Medication List as of 1/11/2018 10:34 PM      START taking these medications    Details   predniSONE (DELTASONE) 20 MG tablet Take 2 tablets (40 mg) by mouth daily for 4 days, Disp-8 tablet, R-0, E-Prescribe             Final diagnoses:   Throat pain     Take Prednisone as ordered. Take with food. Take in the morning.   Gargle salt water.   Use throat lozenges.   Rapid strep is negative. If culture is positive, we will call you.   Follow up with PCP with any increase in symptoms or concerns.   Return to urgent care or emergency department with any increase in symptoms or concerns.     JUAN DIEGO Harrington  1/11/2018  UR[facility]ENT CARE CLINIC       Adilene Fuller NP  01/13/18 4139

## 2018-01-14 NOTE — DISCHARGE INSTRUCTIONS
Take Prednisone as ordered. Take with food. Take in the morning.   Gargle salt water.   Use throat lozenges.   Rapid strep is negative. If culture is positive, we will call you.   Follow up with PCP with any increase in symptoms or concerns.   Return to urgent care or emergency department with any increase in symptoms or concerns.

## 2018-01-16 ENCOUNTER — HOSPITAL ENCOUNTER (EMERGENCY)
Facility: HOSPITAL | Age: 43
Discharge: HOME | End: 2018-01-16
Attending: NURSE PRACTITIONER | Admitting: NURSE PRACTITIONER
Payer: OTHER MISCELLANEOUS

## 2018-01-16 ENCOUNTER — APPOINTMENT (OUTPATIENT)
Dept: GENERAL RADIOLOGY | Facility: HOSPITAL | Age: 43
End: 2018-01-16
Attending: NURSE PRACTITIONER
Payer: OTHER MISCELLANEOUS

## 2018-01-16 VITALS
TEMPERATURE: 98.3 F | DIASTOLIC BLOOD PRESSURE: 84 MMHG | OXYGEN SATURATION: 98 % | RESPIRATION RATE: 16 BRPM | SYSTOLIC BLOOD PRESSURE: 141 MMHG

## 2018-01-16 DIAGNOSIS — J02.0 ACUTE STREPTOCOCCAL PHARYNGITIS: ICD-10-CM

## 2018-01-16 DIAGNOSIS — Z77.21 HISTORY OF POTENTIALLY HAZARDOUS BODY FLUID EXPOSURE: ICD-10-CM

## 2018-01-16 LAB
ALBUMIN SERPL-MCNC: 3.8 G/DL (ref 3.4–5)
ALP SERPL-CCNC: 74 U/L (ref 40–150)
ALT SERPL W P-5'-P-CCNC: 40 U/L (ref 0–70)
ANION GAP SERPL CALCULATED.3IONS-SCNC: 9 MMOL/L (ref 3–14)
AST SERPL W P-5'-P-CCNC: 18 U/L (ref 0–45)
BASOPHILS # BLD AUTO: 0.1 10E9/L (ref 0–0.2)
BASOPHILS NFR BLD AUTO: 0.5 %
BILIRUB SERPL-MCNC: 0.2 MG/DL (ref 0.2–1.3)
BUN SERPL-MCNC: 15 MG/DL (ref 7–30)
CALCIUM SERPL-MCNC: 8.9 MG/DL (ref 8.5–10.1)
CHLORIDE SERPL-SCNC: 104 MMOL/L (ref 94–109)
CO2 SERPL-SCNC: 27 MMOL/L (ref 20–32)
CREAT SERPL-MCNC: 0.84 MG/DL (ref 0.66–1.25)
DIFFERENTIAL METHOD BLD: ABNORMAL
EOSINOPHIL # BLD AUTO: 0 10E9/L (ref 0–0.7)
EOSINOPHIL NFR BLD AUTO: 0.1 %
ERYTHROCYTE [DISTWIDTH] IN BLOOD BY AUTOMATED COUNT: 12.4 % (ref 10–15)
FLUAV+FLUBV AG SPEC QL: NEGATIVE
FLUAV+FLUBV AG SPEC QL: NEGATIVE
GFR SERPL CREATININE-BSD FRML MDRD: >90 ML/MIN/1.7M2
GLUCOSE SERPL-MCNC: 115 MG/DL (ref 70–99)
HCT VFR BLD AUTO: 40.7 % (ref 40–53)
HGB BLD-MCNC: 13.8 G/DL (ref 13.3–17.7)
IMM GRANULOCYTES # BLD: 0.3 10E9/L (ref 0–0.4)
IMM GRANULOCYTES NFR BLD: 2 %
LYMPHOCYTES # BLD AUTO: 2.3 10E9/L (ref 0.8–5.3)
LYMPHOCYTES NFR BLD AUTO: 15.3 %
MCH RBC QN AUTO: 30 PG (ref 26.5–33)
MCHC RBC AUTO-ENTMCNC: 33.9 G/DL (ref 31.5–36.5)
MCV RBC AUTO: 89 FL (ref 78–100)
MONOCYTES # BLD AUTO: 0.5 10E9/L (ref 0–1.3)
MONOCYTES NFR BLD AUTO: 3.1 %
NEUTROPHILS # BLD AUTO: 11.9 10E9/L (ref 1.6–8.3)
NEUTROPHILS NFR BLD AUTO: 79 %
NRBC # BLD AUTO: 0 10*3/UL
NRBC BLD AUTO-RTO: 0 /100
PLATELET # BLD AUTO: 409 10E9/L (ref 150–450)
POTASSIUM SERPL-SCNC: 4.4 MMOL/L (ref 3.4–5.3)
PROT SERPL-MCNC: 7.9 G/DL (ref 6.8–8.8)
RBC # BLD AUTO: 4.6 10E12/L (ref 4.4–5.9)
SODIUM SERPL-SCNC: 140 MMOL/L (ref 133–144)
SPECIMEN SOURCE: NORMAL
WBC # BLD AUTO: 15.1 10E9/L (ref 4–11)

## 2018-01-16 PROCEDURE — G0463 HOSPITAL OUTPT CLINIC VISIT: HCPCS | Mod: 25

## 2018-01-16 PROCEDURE — 87631 RESP VIRUS 3-5 TARGETS: CPT | Performed by: NURSE PRACTITIONER

## 2018-01-16 PROCEDURE — 87804 INFLUENZA ASSAY W/OPTIC: CPT | Performed by: FAMILY MEDICINE

## 2018-01-16 PROCEDURE — 71046 X-RAY EXAM CHEST 2 VIEWS: CPT | Mod: TC

## 2018-01-16 PROCEDURE — 99214 OFFICE O/P EST MOD 30 MIN: CPT | Performed by: NURSE PRACTITIONER

## 2018-01-16 PROCEDURE — 85025 COMPLETE CBC W/AUTO DIFF WBC: CPT | Performed by: NURSE PRACTITIONER

## 2018-01-16 PROCEDURE — 80053 COMPREHEN METABOLIC PANEL: CPT | Performed by: NURSE PRACTITIONER

## 2018-01-16 PROCEDURE — 36415 COLL VENOUS BLD VENIPUNCTURE: CPT | Performed by: NURSE PRACTITIONER

## 2018-01-16 RX ORDER — OSELTAMIVIR PHOSPHATE 75 MG/1
75 CAPSULE ORAL 2 TIMES DAILY
Qty: 10 CAPSULE | Refills: 0 | Status: SHIPPED | OUTPATIENT
Start: 2018-01-16 | End: 2018-01-21

## 2018-01-16 RX ORDER — CETIRIZINE HYDROCHLORIDE 10 MG/1
10 TABLET ORAL DAILY
COMMUNITY

## 2018-01-16 RX ORDER — AMOXICILLIN 500 MG/1
500 TABLET, FILM COATED ORAL 3 TIMES DAILY
Qty: 21 TABLET | Refills: 0 | Status: SHIPPED | OUTPATIENT
Start: 2018-01-16 | End: 2018-01-23

## 2018-01-16 NOTE — ED AVS SNAPSHOT
[facility]    [address] [address]    Phone:  [phone]                                       Jese Joe   MRN: [id number]    Department:  [facility]   Date of Visit:  1/16/2018           After Visit Summary Signature Page     I have received my discharge instructions, and my questions have been answered. I have discussed any challenges I see with this plan with the nurse or doctor.    ..........................................................................................................................................  Patient/Patient Representative Signature      ..........................................................................................................................................  Patient Representative Print Name and Relationship to Patient    ..................................................               ................................................  Date                                            Time    ..........................................................................................................................................  Reviewed by Signature/Title    ...................................................              ..............................................  Date                                                            Time

## 2018-01-16 NOTE — ED AVS SNAPSHOT
[facility]    [address] [address]    Phone:  [phone]                                       Jese Joe   MRN: [id number]    Department:  [facility]   Date of Visit:  1/16/2018           Patient Information     Date Of Birth          1975        Your diagnoses for this visit were:     Acute streptococcal pharyngitis     History of potentially hazardous body fluid exposure        You were seen by Ryanne Philip NP.      Follow-up Information     Follow up with Andrei Philip MD On 1/18/2018.    Specialty:  Family Practice    Why:  at 3:30 for re-evaluation    Contact information:    JENNIE VÁZQUEZ [facility]  [address] [place] [id number] [phone]          Follow up with [facility].    Specialty:  EMERGENCY MEDICINE    Why:  If symptoms worsen    Contact information:    [address] [address] [phone]    Additional information:    From [place]: Take [address]. Turn left at [address]. Turn left at the first stoplight on [address]. At the first stop sign, take a right onto [address]. Take a left into the parking lot and continue through until you reach the North enterance of the building.       From [place]: Take [address]. Take the [address] ramp towards [place]. Turn left onto [address]. Take a slight right onto [address]. Turn left at the first stoplight on [address]. At the first stop sign, take a right onto [address]. Take a left into the parking lot and continue through until you reach the North enterance of the building.       From [place]: Take [address]. Take a right at [address]. At the first stop sign, take a right onto [address]. Take a left into the parking lot and continue through until you reach the North enterance of the building.         Discharge Instructions       Rest,  push fluids.   Take medications as ordered.   Off work until re-evaluated by PCP as scheduled.         Pharyngitis: Strep (Confirmed)    You have had a positive test for strep throat. Strep throat is a contagious illness. It is spread by coughing, kissing or by touching others after touching your mouth or nose. Symptoms include throat pain that is worse with swallowing, aching all over, headache, and fever. It is treated with antibiotic medicine. This should help you start to feel better in 1 to 2 days.  Home care    Rest at home. Drink plenty of fluids to you won't get dehydrated.    No work or school for the first 2 days of taking the antibiotics. After this time, you will not be contagious. You can then return to school or work if you are feeling better.     Take antibiotic medicine for the full 10 days, even if you feel better. This is very important to ensure the infection is treated. It is also important to prevent medicine-resistant germs from developing. If you were given an antibiotic shot, you don't need any more antibiotics.    You may use acetaminophen or ibuprofen to control pain or fever, unless another medicine was prescribed for this. Talk with your doctor before taking these medicines if you have chronic liver or kidney disease. Also talk with your doctor if you have had a stomach ulcer or GI bleeding.    Throat lozenges or sprays help reduce pain. Gargling with warm saltwater will also reduce throat pain. Dissolve 1/2 teaspoon of salt in 1 glass of warm water. This may be useful just before meals.     Soft foods are OK. Avoid salty or spicy foods.  Follow-up care  Follow up with your healthcare provider or our staff if you don't get better over the next week.  When to seek medical advice  Call your healthcare provider right away if any of these occur:    Fever of 100.4 F (38 C) or higher, or as directed by your healthcare provider    New or worsening ear pain, sinus pain, or headache    Painful  lumps in the back of neck    Stiff neck    Lymph nodes getting larger or becoming soft in the middle    You can't swallow liquids or you can't open your mouth wide because of throat pain    Signs of dehydration. These include very dark urine or no urine, sunken eyes, and dizziness.    Trouble breathing or noisy breathing    Muffled voice    Rash  Date Last Reviewed: 4/13/2015 2000-2017 The "[employer]. [address] [place] [address]. All rights reserved. This information is not intended as a substitute for professional medical care. Always follow your healthcare professional's instructions.          Future Appointments        Provider Department Dept Phone Center    1/18/2018 3:45 PM Andrei Philip MD [facility] [phone] [facility]         Review of your medicines      START taking        Dose / Directions Last dose taken    amoxicillin 500 MG tablet   Commonly known as:  AMOXIL   Dose:  500 mg   Quantity:  21 tablet        Take 1 tablet (500 mg) by mouth 3 times daily for 7 days   Refills:  0        oseltamivir 75 MG capsule   Commonly known as:  TAMIFLU   Dose:  75 mg   Quantity:  10 capsule        Take 1 capsule (75 mg) by mouth 2 times daily for 5 days   Refills:  0          Our records show that you are taking the medicines listed below. If these are incorrect, please call your family doctor or clinic.        Dose / Directions Last dose taken    albuterol 108 (90 BASE) MCG/ACT Inhaler   Commonly known as:  PROAIR HFA/PROVENTIL HFA/VENTOLIN HFA   Dose:  2 puff   Quantity:  1 Inhaler        Inhale 2 puffs into the lungs every 4 hours as needed for shortness of breath / dyspnea or wheezing   Refills:  0        cetirizine 10 MG tablet   Commonly known as:  zyrTEC   Dose:  10 mg        Take 10 mg by mouth daily   Refills:  0        cyclobenzaprine 10 MG tablet   Commonly known as:  FLEXERIL   Dose:  10 mg   Quantity:  60 tablet        Take 1 tablet (10 mg) by mouth 3  times daily as needed for muscle spasms   Refills:  0        HYDROcodone-acetaminophen 5-325 MG per tablet   Commonly known as:  NORCO   Quantity:  60 tablet        TAKE 1 TABLET BY MOUTH EVERY 4 TO 6 HOURS AS NEEDED FOR PAIN   Refills:  0        IBUPROFEN PO   Dose:  800 mg        Take 800 mg by mouth   Refills:  0        ondansetron 4 MG ODT tab   Commonly known as:  ZOFRAN-ODT   Quantity:  20 tablet        DISSOLVE 1 TABLET IN MOUTH EVERY 6 HOURS AS NEEDED FOR NAUSEA   Refills:  0        TYLENOL PO   Dose:  1000 mg        Take 1,000 mg by mouth   Refills:  0          ASK your doctor about these medications        Dose / Directions Last dose taken    predniSONE 20 MG tablet   Commonly known as:  DELTASONE   Dose:  40 mg   Quantity:  8 tablet   Ask about: Should I take this medication?        Take 2 tablets (40 mg) by mouth daily for 4 days   Refills:  0                Prescriptions were sent or printed at these locations (2 Prescriptions)                   [facility] [place] - [address]   [address] [place] [address]    Telephone:  [phone]   Fax:  [phone]   Hours:                  E-Prescribed (2 of 2)         amoxicillin (AMOXIL) 500 MG tablet               oseltamivir (TAMIFLU) 75 MG capsule                Procedures and tests performed during your visit     CBC with platelets differential    Chest XR,  PA & LAT    Comprehensive metabolic panel    Influenza A and B and RSV PCR    Influenza A/B antigen      Orders Needing Specimen Collection     None      Pending Results     Date and Time Order Name Status Description    1/16/2018 1445 Influenza A and B and RSV PCR In process             Pending Culture Results     Date and Time Order Name Status Description    1/16/2018 1445 Influenza A and B and RSV PCR In process             Thank you for choosing [facility]       Thank you for choosing [facility] for your care. Our goal is always to provide you with excellent care.  Hearing back from our patients is one way we can continue to improve our services. Please take a few minutes to complete the written survey that you may receive in the mail after you visit with us. Thank you!        [employer] Information     [facility] gives you secure access to your electronic health record. If you see a primary care provider, you can also send messages to your care team and make appointments. If you have questions, please call your primary care clinic.  If you do not have a primary care provider, please call [phone] and they will assist you.        Care EveryWhere ID     This is your Care EveryWhere ID. This could be used by other organizations to access your [facility] medical records  [id number]        Equal Access to Services     BRENNAN TELLO : Yulissa Lala, nora french, archana deal, lazaro pepe. So [facility] [phone].    ATENCIÓN: Si habla español, tiene a spangler disposición servicios gratuitos de asistencia lingüística. Llame al [phone].    We comply with applicable federal civil rights laws and [place] laws. We do not discriminate on the basis of race, color, national origin, age, disability, sex, sexual orientation, or gender identity.            After Visit Summary       This is your record. Keep this with you and show to your community pharmacist(s) and doctor(s) at your next visit.

## 2018-01-16 NOTE — DISCHARGE INSTRUCTIONS
Rest, push fluids.   Take medications as ordered.   Off work until re-evaluated by PCP as scheduled.         Pharyngitis: Strep (Confirmed)    You have had a positive test for strep throat. Strep throat is a contagious illness. It is spread by coughing, kissing or by touching others after touching your mouth or nose. Symptoms include throat pain that is worse with swallowing, aching all over, headache, and fever. It is treated with antibiotic medicine. This should help you start to feel better in 1 to 2 days.  Home care    Rest at home. Drink plenty of fluids to you won't get dehydrated.    No work or school for the first 2 days of taking the antibiotics. After this time, you will not be contagious. You can then return to school or work if you are feeling better.     Take antibiotic medicine for the full 10 days, even if you feel better. This is very important to ensure the infection is treated. It is also important to prevent medicine-resistant germs from developing. If you were given an antibiotic shot, you don't need any more antibiotics.    You may use acetaminophen or ibuprofen to control pain or fever, unless another medicine was prescribed for this. Talk with your doctor before taking these medicines if you have chronic liver or kidney disease. Also talk with your doctor if you have had a stomach ulcer or GI bleeding.    Throat lozenges or sprays help reduce pain. Gargling with warm saltwater will also reduce throat pain. Dissolve 1/2 teaspoon of salt in 1 glass of warm water. This may be useful just before meals.     Soft foods are OK. Avoid salty or spicy foods.  Follow-up care  Follow up with your healthcare provider or our staff if you don't get better over the next week.  When to seek medical advice  Call your healthcare provider right away if any of these occur:    Fever of 100.4 F (38 C) or higher, or as directed by your healthcare provider    New or worsening ear pain, sinus pain, or  headache    Painful lumps in the back of neck    Stiff neck    Lymph nodes getting larger or becoming soft in the middle    You can't swallow liquids or you can't open your mouth wide because of throat pain    Signs of dehydration. These include very dark urine or no urine, sunken eyes, and dizziness.    Trouble breathing or noisy breathing    Muffled voice    Rash  Date Last Reviewed: 4/13/2015 2000-2017 [employer]. [address] [place] [address], [place] [id number]. All rights reserved. This information is not intended as a substitute for professional medical care. Always follow your healthcare professional's instructions.

## 2018-01-16 NOTE — ED PROVIDER NOTES
History     Chief Complaint   Patient presents with     Generalized Body Aches     Headache     Pharyngitis     The history is provided by the patient. No  was used.     Jese Joe is a 42 year old male who presents with body aches, headache and sore throat since 1/8/18. Taking ibuprofen for his symptoms.   Was exposed to body fluids of a patient with Group A strep that was in the  ED when Dougie was working on 1/5/18. 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   Initially started on a 5 day course of Pen  mg prophalactlly after body fluid exposure per recommendation by Dr. Gamez at [facility].   The patient that he was exposed to was positive for necrotizing pneumonia with group A strep cultured in his sputum.  Dougie was seen here on 1/11/18 for illness symptoms, initial rapid strep was negative and he was treated with steroids. His culture came back positive for Group A strep on 1/13/18 and treated with a bicillin LA injection.   Today continues to feel poorly, clammy, sore throat with body aches. Family members are not ill at home. He is a non-smoker.     Review of Systems   Constitutional: Positive for chills, diaphoresis and fatigue. Negative for appetite change.   HENT: Positive for sore throat. Negative for ear pain and rhinorrhea.    Respiratory: Negative for cough, shortness of breath and wheezing.    Gastrointestinal: Negative for abdominal pain.   Musculoskeletal:        Generalized body aches   Skin: Positive for pallor. Negative for rash.       Physical Exam   BP: 141/84  Heart Rate: 77  Temp: 98.3  F (36.8  C)  Resp: 16  SpO2: 98 %      Physical Exam   Constitutional: He appears well-developed and well-nourished. No distress.   HENT:   Head: Normocephalic and atraumatic.   Right Ear: Tympanic membrane and external ear normal.   Left Ear: Tympanic membrane and external ear normal.   Nose: Nose normal.   Mouth/Throat: Uvula is midline and mucous membranes are normal.  No trismus in the jaw. Posterior oropharyngeal edema and posterior oropharyngeal erythema present. No oropharyngeal exudate or tonsillar abscesses.   Eyes: Conjunctivae and lids are normal. No scleral icterus.   Neck: Normal range of motion. Neck supple. No rigidity. No edema, no erythema and normal range of motion present.   Cardiovascular: Normal rate, regular rhythm and normal heart sounds.    Pulmonary/Chest: Effort normal and breath sounds normal. He has no decreased breath sounds. He has no wheezes.   Lymphadenopathy:        Head (right side): Tonsillar adenopathy present. No submental, no submandibular, no preauricular, no posterior auricular and no occipital adenopathy present.        Head (left side): Tonsillar adenopathy present. No submental, no submandibular, no preauricular, no posterior auricular and no occipital adenopathy present.   Skin: Skin is warm, dry and intact. He is not diaphoretic.   Psychiatric: He has a normal mood and affect. His speech is normal.   Nursing note and vitals reviewed.      ED Course     ED Course     Procedures    XR CHEST 2 VW     Clinical history:Male, 42 years, illness;   Comparison: 4/7/2017     Findings: The cardiac silhouette is normal. The pulmonary vasculature  is normal.   The lungs are clear. Bony structures demonstrate mild degenerative  changes throughout the thoracic spine.       Impression:  1.   No evidence of acute or active disease.     GERDA COTTON MD    Labs Ordered and Resulted from Time of ED Arrival Up to the Time of Departure from the ED   CBC WITH PLATELETS DIFFERENTIAL - Abnormal; Notable for the following:        Result Value    WBC 15.1 (*)     Absolute Neutrophil 11.9 (*)     All other components within normal limits   COMPREHENSIVE METABOLIC PANEL - Abnormal; Notable for the following:     Glucose 115 (*)     All other components within normal limits   INFLUENZA A/B ANTIGEN     Consulted with Dr. Gamez at [facility] who had initially advised  of the prophylactic treatment , to be safe can treat with Augmentin. Reassuring that influenza screening is negative. He will discuss with Infectious Disease and call back if recommendations are different.     Dr. Gamez did call back later after speaking with a colleague, they advised to treat with Amoxicillin to avoid SE of Augmentin as it will cover the strep. Should treat with Tamiflu to cover for possible false negative influenza.     Assessments & Plan (with Medical Decision Making)     I have reviewed the nursing notes.  I have reviewed the findings, diagnosis, plan and need for follow up with the patient.  Elevated white count, likely due to strep throat.  Ordered viral culture d/t risk of complicated illness. Will notify of results.   Off work for 2 days. Scheduled f/u with PCP for re-evaluation.   Take medications as ordered. Rest, push fluids.   Analgesics for fever and pain.   Return here if symptoms worse.     Discharge Medication List as of 1/16/2018  3:29 PM      START taking these medications    Details   amoxicillin (AMOXIL) 500 MG tablet Take 1 tablet (500 mg) by mouth 3 times daily for 7 days, Disp-21 tablet, R-0, E-Prescribe      oseltamivir (TAMIFLU) 75 MG capsule Take 1 capsule (75 mg) by mouth 2 times daily for 5 days, Disp-10 capsule, R-0, E-Prescribe           Final diagnoses:   Acute streptococcal pharyngitis   History of potentially hazardous body fluid exposure       1/16/2018   [facility]     Ryanne Philip NP  01/20/18 1750

## 2018-01-17 LAB
FLUAV+FLUBV RNA SPEC QL NAA+PROBE: NEGATIVE
FLUAV+FLUBV RNA SPEC QL NAA+PROBE: NEGATIVE
RSV RNA SPEC NAA+PROBE: NEGATIVE
SPECIMEN SOURCE: NORMAL

## 2018-01-20 ASSESSMENT — ENCOUNTER SYMPTOMS
CHILLS: 1
RHINORRHEA: 0
ABDOMINAL PAIN: 0
DIAPHORESIS: 1
WHEEZING: 0
APPETITE CHANGE: 0
SORE THROAT: 1
SHORTNESS OF BREATH: 0
FATIGUE: 1
COUGH: 0

## 2018-01-24 ENCOUNTER — OFFICE VISIT (OUTPATIENT)
Dept: FAMILY MEDICINE | Facility: OTHER | Age: 43
End: 2018-01-24
Attending: FAMILY MEDICINE
Payer: OTHER MISCELLANEOUS

## 2018-01-24 VITALS
TEMPERATURE: 97.7 F | SYSTOLIC BLOOD PRESSURE: 126 MMHG | DIASTOLIC BLOOD PRESSURE: 74 MMHG | BODY MASS INDEX: 24.65 KG/M2 | HEIGHT: 72 IN | OXYGEN SATURATION: 98 % | WEIGHT: 182 LBS | HEART RATE: 68 BPM

## 2018-01-24 DIAGNOSIS — Z77.21 HISTORY OF POTENTIALLY HAZARDOUS BODY FLUID EXPOSURE: Primary | ICD-10-CM

## 2018-01-24 PROCEDURE — 99213 OFFICE O/P EST LOW 20 MIN: CPT | Performed by: FAMILY MEDICINE

## 2018-01-24 ASSESSMENT — ANXIETY QUESTIONNAIRES
2. NOT BEING ABLE TO STOP OR CONTROL WORRYING: SEVERAL DAYS
IF YOU CHECKED OFF ANY PROBLEMS ON THIS QUESTIONNAIRE, HOW DIFFICULT HAVE THESE PROBLEMS MADE IT FOR YOU TO DO YOUR WORK, TAKE CARE OF THINGS AT HOME, OR GET ALONG WITH OTHER PEOPLE: NOT DIFFICULT AT ALL
GAD7 TOTAL SCORE: 3
7. FEELING AFRAID AS IF SOMETHING AWFUL MIGHT HAPPEN: NOT AT ALL
3. WORRYING TOO MUCH ABOUT DIFFERENT THINGS: NOT AT ALL
4. TROUBLE RELAXING: NOT AT ALL
1. FEELING NERVOUS, ANXIOUS, OR ON EDGE: SEVERAL DAYS
5. BEING SO RESTLESS THAT IT IS HARD TO SIT STILL: SEVERAL DAYS
6. BECOMING EASILY ANNOYED OR IRRITABLE: NOT AT ALL

## 2018-01-24 ASSESSMENT — PAIN SCALES - GENERAL: PAINLEVEL: MILD PAIN (3)

## 2018-01-24 NOTE — NURSING NOTE
"Chief Complaint   Patient presents with     Work Comp       Initial /74  Pulse 68  Temp 97.7  F (36.5  C)  Ht 5' 11.5\" (1.816 m)  Wt 182 lb (82.6 kg)  SpO2 98%  BMI 25.03 kg/m2 Estimated body mass index is 25.03 kg/(m^2) as calculated from the following:    Height as of this encounter: 5' 11.5\" (1.816 m).    Weight as of this encounter: 182 lb (82.6 kg).  Medication Reconciliation: complete   Soo Turcios    "

## 2018-01-24 NOTE — PROGRESS NOTES
"Occupational Visit     Subjective:  Jese Joe, 43 year old, male is seen today for work comp.  The date of injury is 1/16/18.  The patient is employed at [employer].  He had a body fluid exposure.  He was ill for some time, but now is well again and ready to go back to work.      Allergies   Allergen Reactions     Cleocin Hives         Review of Systems:  All negative.       OBJECTIVE:  Vitals: /74  Pulse 68  Temp 97.7  F (36.5  C)  Ht 5' 11.5\" (1.816 m)  Wt 182 lb (82.6 kg)  SpO2 98%  BMI 25.03 kg/m2    Exam:  HEENT:  Normal.   CV:  RRR   Lungs:  CTAB      Labs:none indicated.       ASSESSMENT/PLAN:    (Z77.21) History of potentially hazardous body fluid exposure  (primary encounter diagnosis)  Comment: resolved.   Plan: illness resolved.  Back to work without restriction.  MMI has been met.       "

## 2018-01-24 NOTE — MR AVS SNAPSHOT
After Visit Summary   1/24/2018    Jese Joe    MRN: [id number]           Patient Information     Date Of Birth          1975        Visit Information        Provider Department      1/24/2018 3:45 PM Andrei Philip MD [facility]        Today's Diagnoses     History of potentially hazardous body fluid exposure    -  1      Care Instructions    F/u with ongoing concerns.           Follow-ups after your visit        Your next 10 appointments already scheduled     Jan 24, 2018  3:45 PM CST   (Arrive by 3:30 PM)   Office Visit with Andrei Philip MD   [facility] ([facility] )    [address]  [facility] [address]   [phone]           Bring a current list of meds and any records pertaining to this visit.  For Physicals, please bring immunization records and any forms needing to be filled out.  Please arrive 15 minutes early to complete paperwork and register.              Who to contact     If you have questions or need follow up information about today's clinic visit or your schedule please contact [facility] directly at [phone].  Normal or non-critical lab and imaging results will be communicated to you by [employer], letter or phone within 4 business days after the clinic has received the results. If you do not hear from us within 7 days, please contact the clinic through [employer] or phone. If you have a critical or abnormal lab result, we will notify you by phone as soon as possible.  Submit refill requests through [employer] or call your pharmacy and they will forward the refill request to us. Please allow 3 business days for your refill to be completed.          Additional Information About Your Visit        MyChart Information     [employer] gives you secure access to your electronic health record. If you see a primary care provider, you can also send messages to your care team and make appointments. If you have  "questions, please call your primary care clinic.  If you do not have a primary care provider, please call [phone] and they will assist you.        Care EveryWhere ID     This is your Care EveryWhere ID. This could be used by other organizations to access your [facility] medical records  [id number]        Your Vitals Were     Pulse Temperature Height Pulse Oximetry BMI (Body Mass Index)       68 97.7  F (36.5  C) 5' 11.5\" (1.816 m) 98% 25.03 kg/m2        Blood Pressure from Last 3 Encounters:   01/24/18 126/74   01/16/18 141/84   01/13/18 139/86    Weight from Last 3 Encounters:   01/24/18 182 lb (82.6 kg)   08/07/17 179 lb (81.2 kg)   07/31/17 179 lb (81.2 kg)              Today, you had the following     No orders found for display       Primary Care Provider Office Phone # Fax #    Andrei Philip [phone]       [place] [address] [address] [facility] [address]        Equal Access to Services     [facility]: Hadii aad ku hadasho Soomaali, waaxda luqadaha, qaybta kaalmada adeegyada, lazaro pfeiffer . So [facility] [phone].    ATENCIÓN: Si habla español, tiene a spangler disposición servicios gratuitos de asistencia lingüística. Sergio al [phone].    We comply with applicable federal civil rights laws and [place] laws. We do not discriminate on the basis of race, color, national origin, age, disability, sex, sexual orientation, or gender identity.            Thank you!     Thank you for choosing [facility]  for your care. Our goal is always to provide you with excellent care. Hearing back from our patients is one way we can continue to improve our services. Please take a few minutes to complete the written survey that you may receive in the mail after your visit with us. Thank you!             Your Updated Medication List - Protect others around you: Learn how to safely use, store and throw away your medicines at www.disposemymeds.org.    "       This list is accurate as of 1/24/18  2:53 PM.  Always use your most recent med list.                   Brand Name Dispense Instructions for use Diagnosis    albuterol 108 (90 BASE) MCG/ACT Inhaler    PROAIR HFA/PROVENTIL HFA/VENTOLIN HFA    1 Inhaler    Inhale 2 puffs into the lungs every 4 hours as needed for shortness of breath / dyspnea or wheezing        cetirizine 10 MG tablet    zyrTEC     Take 10 mg by mouth daily        cyclobenzaprine 10 MG tablet    FLEXERIL    60 tablet    Take 1 tablet (10 mg) by mouth 3 times daily as needed for muscle spasms    Cervicalgia, Neck pain       HYDROcodone-acetaminophen 5-325 MG per tablet    NORCO    60 tablet    TAKE 1 TABLET BY MOUTH EVERY 4 TO 6 HOURS AS NEEDED FOR PAIN    Neck pain       IBUPROFEN PO      Take 800 mg by mouth        ondansetron 4 MG ODT tab    ZOFRAN-ODT    20 tablet    DISSOLVE 1 TABLET IN MOUTH EVERY 6 HOURS AS NEEDED FOR NAUSEA    Nausea       TYLENOL PO      Take 1,000 mg by mouth

## 2018-01-25 ASSESSMENT — PATIENT HEALTH QUESTIONNAIRE - PHQ9: SUM OF ALL RESPONSES TO PHQ QUESTIONS 1-9: 1

## 2018-01-25 ASSESSMENT — ANXIETY QUESTIONNAIRES: GAD7 TOTAL SCORE: 3

## 2018-02-19 ENCOUNTER — DOCUMENTATION ONLY (OUTPATIENT)
Dept: FAMILY MEDICINE | Facility: OTHER | Age: 43
End: 2018-02-19

## 2018-02-20 ENCOUNTER — HOSPITAL ENCOUNTER (EMERGENCY)
Facility: HOSPITAL | Age: 43
Discharge: HOME | End: 2018-02-20
Attending: PHYSICIAN ASSISTANT | Admitting: PHYSICIAN ASSISTANT
Payer: COMMERCIAL

## 2018-02-20 VITALS
OXYGEN SATURATION: 98 % | WEIGHT: 190 LBS | HEART RATE: 81 BPM | BODY MASS INDEX: 26.13 KG/M2 | SYSTOLIC BLOOD PRESSURE: 155 MMHG | RESPIRATION RATE: 20 BRPM | DIASTOLIC BLOOD PRESSURE: 56 MMHG | TEMPERATURE: 96.7 F

## 2018-02-20 DIAGNOSIS — M54.2 NECK PAIN: ICD-10-CM

## 2018-02-20 DIAGNOSIS — J20.9 ACUTE BRONCHITIS, UNSPECIFIED ORGANISM: ICD-10-CM

## 2018-02-20 PROCEDURE — 99213 OFFICE O/P EST LOW 20 MIN: CPT | Performed by: PHYSICIAN ASSISTANT

## 2018-02-20 PROCEDURE — G0463 HOSPITAL OUTPT CLINIC VISIT: HCPCS

## 2018-02-20 RX ORDER — PREDNISONE 20 MG/1
TABLET ORAL
Qty: 10 TABLET | Refills: 0 | Status: SHIPPED | OUTPATIENT
Start: 2018-02-20 | End: 2018-03-26

## 2018-02-20 ASSESSMENT — ENCOUNTER SYMPTOMS
LIGHT-HEADEDNESS: 0
VOICE CHANGE: 0
NAUSEA: 0
TROUBLE SWALLOWING: 0
VOMITING: 0
CARDIOVASCULAR NEGATIVE: 1
COUGH: 1
NECK PAIN: 0
EYE DISCHARGE: 0
HEADACHES: 0
DIARRHEA: 0
EYE REDNESS: 0
ABDOMINAL PAIN: 0
FEVER: 0
NECK STIFFNESS: 0
SORE THROAT: 0
PSYCHIATRIC NEGATIVE: 1
FATIGUE: 1
DIZZINESS: 0
APPETITE CHANGE: 0

## 2018-02-20 NOTE — LETTER
[facility]  [address] [address]  Phone: [phone]    February 20, 2018        Jese Joe  [address]   [address] [facility] [address]          To whom it may concern:    RE: Jese Joe    Patient was seen and treated today at our clinic.    Please, excuse him from work on 20-21 Feb 2018, due to illness.      Sincerely,        Haley Simon Certified  Physician Assistant  2/20/2018  11:11 AM  URGENT CARE CLINIC

## 2018-02-20 NOTE — ED AVS SNAPSHOT
[facility]    [address] [address]    Phone:  [phone]                                       Jese Joe   MRN: [id number]    Department:  [facility]   Date of Visit:  2/20/2018           Patient Information     Date Of Birth          1975        Your diagnoses for this visit were:     Acute bronchitis, unspecified organism        You were seen by Haley Simon PA.      Follow-up Information     Follow up with Andrei Philip MD.    Specialty:  Family Practice    Why:  If symptoms worsen    Contact information:    [address]  [facility] [id number] [phone]          Follow up with [facility].    Specialty:  EMERGENCY MEDICINE    Why:  If further concerns develop    Contact information:    [address] [address] [phone]    Additional information:    From [facility]: Take [address]. Turn left at [address]. Turn left at the first stoplight on [address] [place] [address]. At the first stop sign, take a right onto [address]. Take a left into the parking lot and continue through until you reach the North enterance of the building.       From [place]: Take [address]. Take the [address] ramp towards [place]. Turn left onto [address]. Take a slight right onto [address] [facility]. Turn left at the first stoplight on [address]. At the first stop sign, take a right onto [address]. Take a left into the parking lot and continue through until you reach the North enterance of the building.       From [place]: Take [address]. Take a right at [address]. At the first stop sign, take a right onto [address]. Take a left into the parking lot and continue through until you reach the North enterance of the building.       Discharge References/Attachments     BRONCHITIS, ANTIBIOTIC TREATMENT (ADULT) (ENGLISH)         Review of your medicines       START taking        Dose / Directions Last dose taken    amoxicillin-clavulanate 875-125 MG per tablet   Commonly known as:  AUGMENTIN   Dose:  1 tablet   Quantity:  20 tablet        Take 1 tablet by mouth 2 times daily   Refills:  0        predniSONE 20 MG tablet   Commonly known as:  DELTASONE   Quantity:  10 tablet        Take two tablets (= 40mg) each day for 5 (five) days   Refills:  0          Our records show that you are taking the medicines listed below. If these are incorrect, please call your family doctor or clinic.        Dose / Directions Last dose taken    albuterol 108 (90 BASE) MCG/ACT Inhaler   Commonly known as:  PROAIR HFA/PROVENTIL HFA/VENTOLIN HFA   Dose:  2 puff   Quantity:  1 Inhaler        Inhale 2 puffs into the lungs every 4 hours as needed for shortness of breath / dyspnea or wheezing   Refills:  0        cetirizine 10 MG tablet   Commonly known as:  zyrTEC   Dose:  10 mg        Take 10 mg by mouth daily   Refills:  0        cyclobenzaprine 10 MG tablet   Commonly known as:  FLEXERIL   Dose:  10 mg   Quantity:  60 tablet        Take 1 tablet (10 mg) by mouth 3 times daily as needed for muscle spasms   Refills:  0        HYDROcodone-acetaminophen 5-325 MG per tablet   Commonly known as:  NORCO   Quantity:  60 tablet        TAKE 1 TABLET BY MOUTH EVERY 4 TO 6 HOURS AS NEEDED FOR PAIN   Refills:  0        IBUPROFEN PO   Dose:  800 mg        Take 800 mg by mouth   Refills:  0        ondansetron 4 MG ODT tab   Commonly known as:  ZOFRAN-ODT   Quantity:  20 tablet        DISSOLVE 1 TABLET IN MOUTH EVERY 6 HOURS AS NEEDED FOR NAUSEA   Refills:  0        TYLENOL PO   Dose:  1000 mg        Take 1,000 mg by mouth   Refills:  0                Prescriptions were sent or printed at these locations (2 Prescriptions)                   [facility] [address]   [address] [address]    Telephone:  [phone]   Fax:  [phone]   Hours:                   E-Prescribed (2 of 2)         amoxicillin-clavulanate (AUGMENTIN) 875-125 MG per tablet               predniSONE (DELTASONE) 20 MG tablet                Orders Needing Specimen Collection     None      Pending Results     No orders found from 2/18/2018 to 2/21/2018.            Pending Culture Results     No orders found from 2/18/2018 to 2/21/2018.            Thank you for choosing [facility]       Thank you for choosing [facility] for your care. Our goal is always to provide you with excellent care. Hearing back from our patients is one way we can continue to improve our services. Please take a few minutes to complete the written survey that you may receive in the mail after you visit with us. Thank you!        [employer] Information     [employer] gives you secure access to your electronic health record. If you see a primary care provider, you can also send messages to your care team and make appointments. If you have questions, please call your primary care clinic.  If you do not have a primary care provider, please call [phone] and they will assist you.        Care EveryWhere ID     This is your Care EveryWhere ID. This could be used by other organizations to access your [facility] medical records  [id number]        Equal Access to Services     BRENNAN TELLO : Yulissa Lala, nora french, lazaro perera. So [facility] [phone].    ATENCIÓN: Si habla español, tiene a spangler disposición servicios gratuitos de asistencia lingüística. Llame al [phone].    We comply with applicable federal civil rights laws and [place] laws. We do not discriminate on the basis of race, color, national origin, age, disability, sex, sexual orientation, or gender identity.            After Visit Summary       This is your record. Keep this with you and show to your community pharmacist(s) and doctor(s) at your next visit.

## 2018-02-20 NOTE — ED PROVIDER NOTES
History     Chief Complaint   Patient presents with     Cough     The history is provided by the patient. No  was used.     Jese Joe is a 43 year old male who has cough/congestion, fatigue x 5 days. No n/v/d/f/c. No ear pain. Had sinus pain/pressure, but this has resolved. No rash. No change in b/b habits    Problem List:    Patient Active Problem List    Diagnosis Date Noted     ACP (advance care planning) 07/31/2017     Priority: Medium     Advance Care Planning 7/31/2017: ACP Review of Chart / Resources Provided:  Reviewed chart for advance care plan.  Jese Joe has no plan or code status on file. Discussed available resources and provided with information.   Added by Indira Santoro                  Past Medical History:    History reviewed. No pertinent past medical history.    Past Surgical History:    Past Surgical History:   Procedure Laterality Date     NO SURGICAL HISTORY[         Family History:    Family History   Problem Relation Age of Onset     DIABETES Maternal Grandfather      HEART DISEASE Paternal Grandfather      MI       Social History:  Marital Status:   [2]  Social History   Substance Use Topics     Smoking status: Former Smoker     Packs/day: 1.00     Years: 4.00     Types: Cigarettes     Quit date: 5/23/2009     Smokeless tobacco: Never Used     Alcohol use Yes      Comment: occasionally        Medications:      amoxicillin-clavulanate (AUGMENTIN) 875-125 MG per tablet   predniSONE (DELTASONE) 20 MG tablet   cetirizine (ZYRTEC) 10 MG tablet   Acetaminophen (TYLENOL PO)   IBUPROFEN PO   HYDROcodone-acetaminophen (NORCO) 5-325 MG per tablet   cyclobenzaprine (FLEXERIL) 10 MG tablet   albuterol (PROAIR HFA/PROVENTIL HFA/VENTOLIN HFA) 108 (90 BASE) MCG/ACT Inhaler   ondansetron (ZOFRAN-ODT) 4 MG disintegrating tablet         Review of Systems   Constitutional: Positive for fatigue. Negative for appetite change and fever.   HENT: Positive for congestion.  Negative for ear pain, sore throat, trouble swallowing and voice change.    Eyes: Negative for discharge and redness.   Respiratory: Positive for cough.    Cardiovascular: Negative.    Gastrointestinal: Negative for abdominal pain, diarrhea, nausea and vomiting.   Genitourinary: Negative.    Musculoskeletal: Negative for neck pain and neck stiffness.   Skin: Negative for rash.   Neurological: Negative for dizziness, light-headedness and headaches.   Psychiatric/Behavioral: Negative.        Physical Exam   BP: 155/56  Pulse: 81  Temp: 96.7  F (35.9  C)  Resp: 20  Weight: 86.2 kg (190 lb)  SpO2: 98 %      Physical Exam   Constitutional: He is oriented to person, place, and time. He appears well-developed and well-nourished. No distress.   HENT:   Head: Normocephalic and atraumatic.   Right Ear: External ear normal.   Left Ear: External ear normal.   Mouth/Throat: Oropharynx is clear and moist.   Bilateral TMs/canals clear/wnl  No sinus TTP     Eyes: Conjunctivae and EOM are normal. Right eye exhibits no discharge. Left eye exhibits no discharge.   Neck: Normal range of motion. Neck supple.   Cardiovascular: Normal rate, regular rhythm and normal heart sounds.    Pulmonary/Chest: Effort normal. No respiratory distress.   RLL rhonchi   Abdominal: Soft. Bowel sounds are normal. He exhibits no distension. There is no tenderness.   Neurological: He is alert and oriented to person, place, and time.   Skin: Skin is warm and dry. No rash noted. He is not diaphoretic.   Psychiatric: He has a normal mood and affect.   Nursing note and vitals reviewed.      ED Course     ED Course     Procedures            Assessments & Plan (with Medical Decision Making)     I have reviewed the nursing notes.    I have reviewed the findings, diagnosis, plan and need for follow up with the patient.      Discharge Medication List as of 2/20/2018 11:12 AM      START taking these medications    Details   amoxicillin-clavulanate (AUGMENTIN) 155-811  MG per tablet Take 1 tablet by mouth 2 times daily, Disp-20 tablet, R-0, E-Prescribe      predniSONE (DELTASONE) 20 MG tablet Take two tablets (= 40mg) each day for 5 (five) days, Disp-10 tablet, R-0, E-Prescribe             Final diagnoses:   Acute bronchitis, unspecified organism         Work excuse given  Patient verbally educated and given appropriate education sheets for each of the diagnoses and has no questions.  Take OTC motrin or tylenol as directed on the bottle as needed.  Take prescription medications as directed.  Increase fluids, wash hands often.  Sleep in a recliner or with multiple pillows until this has resolved.  Follow up with your provider if symptoms increase or if concerns develop, return to the ER.  Haley Simon Certified   Physician Assistant  2/20/2018  12:27 PM  URGENT CARE CLINIC    2/20/2018   [facility]     Haley Simon PA  02/20/18 1164

## 2018-02-20 NOTE — ED AVS SNAPSHOT
[facility]    [address] [address]    Phone:  [phone]                                       Jese Joe   MRN: [id number]    Department:  [facility]   Date of Visit:  2/20/2018           After Visit Summary Signature Page     I have received my discharge instructions, and my questions have been answered. I have discussed any challenges I see with this plan with the nurse or doctor.    ..........................................................................................................................................  Patient/Patient Representative Signature      ..........................................................................................................................................  Patient Representative Print Name and Relationship to Patient    ..................................................               ................................................  Date                                            Time    ..........................................................................................................................................  Reviewed by Signature/Title    ...................................................              ..............................................  Date                                                            Time

## 2018-02-21 RX ORDER — HYDROCODONE BITARTRATE AND ACETAMINOPHEN 5; 325 MG/1; MG/1
TABLET ORAL
Qty: 60 TABLET | Refills: 0 | Status: SHIPPED | OUTPATIENT
Start: 2018-02-21 | End: 2018-11-20

## 2018-02-21 NOTE — TELEPHONE ENCOUNTER
Norco      Last Written Prescription Date:  11/27/17  Last Fill Quantity: 60,   # refills: 0  Last Office Visit: 1/24/18 with Dr. Philip  Future Office visit:   none    Routing refill request to provider for review/approval because:  Drug not on the [facility], P or [facility] refill protocol or controlled substance.

## 2018-02-22 ENCOUNTER — APPOINTMENT (OUTPATIENT)
Dept: GENERAL RADIOLOGY | Facility: HOSPITAL | Age: 43
End: 2018-02-22
Attending: PHYSICIAN ASSISTANT
Payer: COMMERCIAL

## 2018-02-22 ENCOUNTER — HOSPITAL ENCOUNTER (EMERGENCY)
Facility: HOSPITAL | Age: 43
Discharge: HOME | End: 2018-02-22
Attending: PHYSICIAN ASSISTANT | Admitting: PHYSICIAN ASSISTANT
Payer: COMMERCIAL

## 2018-02-22 VITALS
DIASTOLIC BLOOD PRESSURE: 86 MMHG | OXYGEN SATURATION: 97 % | RESPIRATION RATE: 16 BRPM | TEMPERATURE: 96.7 F | SYSTOLIC BLOOD PRESSURE: 153 MMHG

## 2018-02-22 DIAGNOSIS — J20.9 ACUTE BRONCHITIS, UNSPECIFIED ORGANISM: ICD-10-CM

## 2018-02-22 PROCEDURE — 99213 OFFICE O/P EST LOW 20 MIN: CPT | Performed by: PHYSICIAN ASSISTANT

## 2018-02-22 PROCEDURE — G0463 HOSPITAL OUTPT CLINIC VISIT: HCPCS | Mod: 25

## 2018-02-22 PROCEDURE — 71046 X-RAY EXAM CHEST 2 VIEWS: CPT | Mod: TC

## 2018-02-22 ASSESSMENT — ENCOUNTER SYMPTOMS
VOICE CHANGE: 0
HEADACHES: 0
DIARRHEA: 0
EYE DISCHARGE: 0
EYE REDNESS: 0
NECK PAIN: 0
FEVER: 0
SINUS PRESSURE: 0
NECK STIFFNESS: 0
FATIGUE: 1
DIZZINESS: 0
APPETITE CHANGE: 0
CARDIOVASCULAR NEGATIVE: 1
ABDOMINAL PAIN: 0
VOMITING: 0
PSYCHIATRIC NEGATIVE: 1
LIGHT-HEADEDNESS: 0
SORE THROAT: 0
TROUBLE SWALLOWING: 0
NAUSEA: 0
COUGH: 1

## 2018-02-22 NOTE — ED AVS SNAPSHOT
[facility]    [address] [address]    Phone:  [phone]                                       Jese Joe   MRN: [id number]    Department:  [facility]   Date of Visit:  2/22/2018           Patient Information     Date Of Birth          1975        Your diagnoses for this visit were:     Acute bronchitis, unspecified organism        You were seen by Haley Simon PA.      Follow-up Information     Follow up with Andrei Philip MD.    Specialty:  Family Practice    Why:  If symptoms worsen    Contact information:    [address]  [facility] [place], [place] [id number] [phone]          Follow up with [facility].    Specialty:  EMERGENCY MEDICINE    Why:  I further concerns develop    Contact information:    [address] [address] [phone]    Additional information:    From [facility]: Take [address]. Turn left at [address]. Turn left at the first stoplight on [address] [place] [address]. At the first stop sign, take a right onto [address]. Take a left into the parking lot and continue through until you reach the North enterance of the building.       From [place]: Take [address]. Take the [address] ramp towards [place]. Turn left onto [address]. Take a slight right onto [address] [facility] [facility] [address]. Turn left at the first stoplight on [address]. At the first stop sign, take a right onto [address]. Take a left into the parking lot and continue through until you reach the North enterance of the building.       From [place]: Take [address]. Take a right at [address]. At the first stop sign, take a right onto [address]. Take a left into the parking lot and continue through until you reach the North enterance of the building.       Discharge References/Attachments     BRONCHITIS, ACUTE (ENGLISH)    BRONCHITIS, ANTIBIOTIC TREATMENT (ADULT) (ENGLISH)          Review of your medicines      Our records show that you are taking the medicines listed below. If these are incorrect, please call your family doctor or clinic.        Dose / Directions Last dose taken    albuterol 108 (90 BASE) MCG/ACT Inhaler   Commonly known as:  PROAIR HFA/PROVENTIL HFA/VENTOLIN HFA   Dose:  2 puff   Quantity:  1 Inhaler        Inhale 2 puffs into the lungs every 4 hours as needed for shortness of breath / dyspnea or wheezing   Refills:  0        amoxicillin-clavulanate 875-125 MG per tablet   Commonly known as:  AUGMENTIN   Dose:  1 tablet   Quantity:  20 tablet        Take 1 tablet by mouth 2 times daily   Refills:  0        cetirizine 10 MG tablet   Commonly known as:  zyrTEC   Dose:  10 mg        Take 10 mg by mouth daily   Refills:  0        cyclobenzaprine 10 MG tablet   Commonly known as:  FLEXERIL   Dose:  10 mg   Quantity:  60 tablet        Take 1 tablet (10 mg) by mouth 3 times daily as needed for muscle spasms   Refills:  0        HYDROcodone-acetaminophen 5-325 MG per tablet   Commonly known as:  NORCO   Quantity:  60 tablet        TAKE 1 TABLET BY MOUTH EVERY 4 TO 6 HOURS AS NEEDED FOR PAIN   Refills:  0        IBUPROFEN PO   Dose:  800 mg        Take 800 mg by mouth   Refills:  0        ondansetron 4 MG ODT tab   Commonly known as:  ZOFRAN-ODT   Quantity:  20 tablet        DISSOLVE 1 TABLET IN MOUTH EVERY 6 HOURS AS NEEDED FOR NAUSEA   Refills:  0        predniSONE 20 MG tablet   Commonly known as:  DELTASONE   Quantity:  10 tablet        Take two tablets (= 40mg) each day for 5 (five) days   Refills:  0        TYLENOL PO   Dose:  1000 mg        Take 1,000 mg by mouth   Refills:  0                Procedures and tests performed during your visit     Chest XR,  PA & LAT      Orders Needing Specimen Collection     None      Pending Results     Date and Time Order Name Status Description    2/22/2018 1135 Chest XR,  PA & LAT In process             Pending Culture Results     No  orders found from 2/20/2018 to 2/23/2018.            Thank you for choosing [facility]       Thank you for choosing [facility] for your care. Our goal is always to provide you with excellent care. Hearing back from our patients is one way we can continue to improve our services. Please take a few minutes to complete the written survey that you may receive in the mail after you visit with us. Thank you!        [employer] Information     [employer] gives you secure access to your electronic health record. If you see a primary care provider, you can also send messages to your care team and make appointments. If you have questions, please call your primary care clinic.  If you do not have a primary care provider, please call [phone] and they will assist you.        Care EveryWhere ID     This is your Care EveryWhere ID. This could be used by other organizations to access your [facility] medical records  [id number]        Equal Access to Services     BRENNAN TELLO : Yulissa Lala, nora french, lazaro perera. So [facility] [phone].    ATENCIÓN: Si habla español, tiene a spangler disposición servicios gratuitos de asistencia lingüística. Llame al [phone].    We comply with applicable federal civil rights laws and [place] laws. We do not discriminate on the basis of race, color, national origin, age, disability, sex, sexual orientation, or gender identity.            After Visit Summary       This is your record. Keep this with you and show to your community pharmacist(s) and doctor(s) at your next visit.

## 2018-02-22 NOTE — ED PROVIDER NOTES
History     Chief Complaint   Patient presents with     Flu Symptoms     The history is provided by the patient. No  was used.     Jese Joe is a 43 year old male who is back again for cough/congestion, fatigue. I saw the pt for this 2 days ago and dx with bronchitis and placed him on agumentin. Pt also has albuterol inhaler. States he is starting to feel better, but coughing more and can hear the ponce noises when he breathes. Gets tired easy. No n/v/d/f/c.  No sore throat. No sinus pain/pressure. No rash. No change in b/b habits    Problem List:    Patient Active Problem List    Diagnosis Date Noted     ACP (advance care planning) 07/31/2017     Priority: Medium     Advance Care Planning 7/31/2017: ACP Review of Chart / Resources Provided:  Reviewed chart for advance care plan.  Jese Joe has no plan or code status on file. Discussed available resources and provided with information.   Added by Indira Santoro                  Past Medical History:    History reviewed. No pertinent past medical history.    Past Surgical History:    Past Surgical History:   Procedure Laterality Date     NO SURGICAL HISTORY[         Family History:    Family History   Problem Relation Age of Onset     DIABETES Maternal Grandfather      HEART DISEASE Paternal Grandfather      MI       Social History:  Marital Status:   [2]  Social History   Substance Use Topics     Smoking status: Former Smoker     Packs/day: 1.00     Years: 4.00     Types: Cigarettes     Quit date: 5/23/2009     Smokeless tobacco: Never Used     Alcohol use Yes      Comment: occasionally        Medications:      HYDROcodone-acetaminophen (NORCO) 5-325 MG per tablet   amoxicillin-clavulanate (AUGMENTIN) 875-125 MG per tablet   predniSONE (DELTASONE) 20 MG tablet   cetirizine (ZYRTEC) 10 MG tablet   albuterol (PROAIR HFA/PROVENTIL HFA/VENTOLIN HFA) 108 (90 BASE) MCG/ACT Inhaler   Acetaminophen (TYLENOL PO)   IBUPROFEN PO    cyclobenzaprine (FLEXERIL) 10 MG tablet   ondansetron (ZOFRAN-ODT) 4 MG disintegrating tablet         Review of Systems   Constitutional: Positive for fatigue. Negative for appetite change and fever.   HENT: Positive for congestion. Negative for ear pain, sinus pressure, sore throat, trouble swallowing and voice change.    Eyes: Negative for discharge and redness.   Respiratory: Positive for cough.    Cardiovascular: Negative.    Gastrointestinal: Negative for abdominal pain, diarrhea, nausea and vomiting.   Genitourinary: Negative.    Musculoskeletal: Negative for neck pain and neck stiffness.   Skin: Negative for rash.   Neurological: Negative for dizziness, light-headedness and headaches.   Psychiatric/Behavioral: Negative.        Physical Exam   BP: 153/86  Heart Rate: 80  Temp: 96.7  F (35.9  C)  Resp: 16  SpO2: 97 %      Physical Exam   Constitutional: He is oriented to person, place, and time. He appears well-developed and well-nourished. No distress.   HENT:   Head: Normocephalic and atraumatic.   Right Ear: External ear normal.   Left Ear: External ear normal.   Mouth/Throat: Oropharynx is clear and moist.   Bilateral TMs/canals clear/wnl  No sinus TTP     Eyes: Conjunctivae and EOM are normal. Right eye exhibits no discharge. Left eye exhibits no discharge.   Neck: Normal range of motion. Neck supple.   Cardiovascular: Normal rate, regular rhythm and normal heart sounds.    Pulmonary/Chest: Effort normal. No respiratory distress.   RLL rhonchi, does not clear after coughing   Abdominal: Soft. Bowel sounds are normal. He exhibits no distension. There is no tenderness.   Neurological: He is alert and oriented to person, place, and time.   Skin: Skin is warm and dry. No rash noted. He is not diaphoretic.   Psychiatric: He has a normal mood and affect.   Nursing note and vitals reviewed.      ED Course     ED Course     Procedures       Chest XR,  PA & LAT (Final result) Result time: 02/22/18 12:13:08      Final result by Demario Monsalve MD (02/22/18 12:13:08)     Impression:     IMPRESSION:  No acute cardiopulmonary disease.      DEMARIO MONSALVE MD     Narrative:     PROCEDURE:  XR CHEST 2 VW    HISTORY:  RLL rhonchi; .     COMPARISON:  January 16, 2018    FINDINGS:   The cardiac silhouette is normal in size. The pulmonary vasculature is  normal.  The lungs are clear. No pleural effusion or pneumothorax.             Assessments & Plan (with Medical Decision Making)     I have reviewed the nursing notes.    I have reviewed the findings, diagnosis, plan and need for follow up with the patient.      Final diagnoses:   Acute bronchitis, unspecified organism       Work excuse given for today. May go back to work tomorrow, with no limitations.    Patient verbally educated and given appropriate education sheets for each of the diagnoses and has no questions.  Take OTC motrin or tylenol as directed on the bottle as needed.  Take current prescription medications as directed.  Increase fluids, wash hands often.  Sleep in a recliner or with multiple pillows until this has resolved.  Follow up with your provider if symptoms increase or if concerns develop, return to the ER.  Haley Simon Certified   Physician Assistant  2/22/2018  5:14 PM  URGENT CARE CLINIC    2/22/2018   [facility]     Haley Simon PA  02/22/18 9900

## 2018-02-22 NOTE — TELEPHONE ENCOUNTER
Called informed written RX is ready to  at  N  Patient request  to [facility] JUDI Eduardo

## 2018-02-22 NOTE — ED NOTES
Ambulated to room  1 independently.  Cough x 8 days.  Chest hurts when coughing.  Denies pain at this time.  Needs work note.

## 2018-02-22 NOTE — LETTER
[facility]  [address] [address]  Phone: [phone]    February 22, 2018        Jese Joe  [address]   [address] [facility] [address]          To whom it may concern:    RE: Jese Joe    Patient was seen and treated today at our clinic.    Please, excuse him from work today. May return to work without limitations on             23 Feb 2018.      Sincerely,        Haley Simon Certified  Physician Assistant  2/22/2018  12:14 PM  [facility]

## 2018-02-22 NOTE — ED AVS SNAPSHOT
[facility]    [address] [address]    Phone:  [phone]                                       Jese Joe   MRN: [id number]    Department:  [facility]   Date of Visit:  2/22/2018           After Visit Summary Signature Page     I have received my discharge instructions, and my questions have been answered. I have discussed any challenges I see with this plan with the nurse or doctor.    ..........................................................................................................................................  Patient/Patient Representative Signature      ..........................................................................................................................................  Patient Representative Print Name and Relationship to Patient    ..................................................               ................................................  Date                                            Time    ..........................................................................................................................................  Reviewed by Signature/Title    ...................................................              ..............................................  Date                                                            Time

## 2018-02-25 NOTE — PROGRESS NOTES
Outpatient Physical Therapy Discharge Note     Patient: Jese Joe  : 1975    Beginning/End Dates of Reporting Period:  2017 to 2018    Referring Provider: Dr. Loaiza    Therapy Diagnosis: R shoulder pain with radicular symptoms throughout R UE     Client Self Report: On last attended session: Patient reports that he has noticed improvements in motion R shoulder pain and decrease    Objective Measurements:  Objective Measure: AROM  Details: Flexion: 132 Abduction: 130  Objective Measure: PROM  Details: 80+% passive ROM                               Outcome Measures (most recent score):      Goals:  Goal Identifier STG 1   Goal Description Pt to be indepednent and compliant with HEP.   Target Date 17   Date Met      Progress:     Goal Identifier LTG 1   Goal Description Pt to demonstrat improved AROM of R shoulder to full in all motions to be able to carry out ADLs without difficulty.   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   Target Date 10/11/17   Date Met      Progress:     Goal Identifier     Goal Description     Target Date     Date Met      Progress:     Goal Identifier     Goal Description     Target Date     Date Met      Progress:     Goal Identifier     Goal Description     Target Date     Date Met      Progress:     Goal Identifier     Goal Description     Target Date     Date Met      Progress:     Goal Identifier     Goal Description     Target Date     Date Met      Progress:     Progress Toward Goals:   Not assessed this period.  Pt failed to return for additional PT sessions.  During episode patient was reporting improved motion however final assessment unable to be completed          Plan:  Discharge from therapy.    Discharge:    Reason for  Discharge: Patient has failed to schedule further appointments.    Equipment Issued:     Discharge Plan: no plan in place due to patient not following up with additional PT

## 2018-03-12 ENCOUNTER — TELEPHONE (OUTPATIENT)
Dept: FAMILY MEDICINE | Facility: OTHER | Age: 43
End: 2018-03-12

## 2018-03-12 DIAGNOSIS — M25.511 RIGHT SHOULDER PAIN, UNSPECIFIED CHRONICITY: Primary | ICD-10-CM

## 2018-03-12 NOTE — TELEPHONE ENCOUNTER
To:  Nurse of Dr. Philip  Patient would like a return phone call to discuss physical therapy referral for his Right shoulder.  Please try him at his extension @ [facility][phone] -or- [phone]. Thank you

## 2018-03-14 ENCOUNTER — TELEPHONE (OUTPATIENT)
Dept: UROLOGY | Facility: OTHER | Age: 43
End: 2018-03-14

## 2018-03-14 DIAGNOSIS — Z30.2 ENCOUNTER FOR VASECTOMY: Primary | ICD-10-CM

## 2018-03-14 RX ORDER — ALPRAZOLAM 1 MG
TABLET ORAL
Qty: 1 TABLET | Refills: 0 | Status: SHIPPED | OUTPATIENT
Start: 2018-03-14 | End: 2018-11-20

## 2018-03-14 NOTE — TELEPHONE ENCOUNTER
Patient spoke to the Urology  in [place] and told her that he wants to have a sedative prior to his vasectomy.  Patient wanted to pick it up at the clinic  at [facility].  To MD to address.  Cristina Henriquez RN......March 14, 2018...2:13 PM

## 2018-03-22 ENCOUNTER — HOSPITAL ENCOUNTER (OUTPATIENT)
Dept: PHYSICAL THERAPY | Facility: HOSPITAL | Age: 43
Setting detail: THERAPIES SERIES
End: 2018-03-22
Attending: FAMILY MEDICINE
Payer: COMMERCIAL

## 2018-03-22 PROCEDURE — 97161 PT EVAL LOW COMPLEX 20 MIN: CPT | Mod: GP

## 2018-03-22 PROCEDURE — 97110 THERAPEUTIC EXERCISES: CPT | Mod: GP

## 2018-03-22 PROCEDURE — 40000718 ZZHC STATISTIC PT DEPARTMENT ORTHO VISIT

## 2018-03-26 ENCOUNTER — OFFICE VISIT (OUTPATIENT)
Dept: UROLOGY | Facility: OTHER | Age: 43
End: 2018-03-26
Attending: UROLOGY
Payer: COMMERCIAL

## 2018-03-26 VITALS — HEART RATE: 88 BPM | RESPIRATION RATE: 16 BRPM | BODY MASS INDEX: 26.4 KG/M2 | WEIGHT: 188.6 LBS | HEIGHT: 71 IN

## 2018-03-26 DIAGNOSIS — Z30.2 ENCOUNTER FOR VASECTOMY: Primary | ICD-10-CM

## 2018-03-26 PROCEDURE — 88302 TISSUE EXAM BY PATHOLOGIST: CPT

## 2018-03-26 PROCEDURE — 55250 REMOVAL OF SPERM DUCT(S): CPT | Performed by: UROLOGY

## 2018-03-26 PROCEDURE — 99207 ZZC NO CHARGE LOS: CPT | Performed by: UROLOGY

## 2018-03-26 RX ORDER — HYDROCODONE BITARTRATE AND ACETAMINOPHEN 5; 325 MG/1; MG/1
1-2 TABLET ORAL EVERY 4 HOURS PRN
Qty: 8 TABLET | Refills: 0 | Status: SHIPPED | OUTPATIENT
Start: 2018-03-26 | End: 2018-11-20

## 2018-03-26 ASSESSMENT — PAIN SCALES - GENERAL: PAINLEVEL: NO PAIN (0)

## 2018-03-26 NOTE — PROGRESS NOTES
Preoperative diagnosis  Elective sterilization    Postoperative diagnosis  Elective sterilization    Procedure performed  Bilateral vasectomy    Surgeon  Cuauhtemoc Aguirre MD    Anesthesia  8 mL lidocaine 2% local injection    Complications  None    EBL  <1 mL    Specimen  Left vas  Right vas    Indications  43 year old male agreed to undergo the above named procedure after discussion of the alternatives, risks and benefits.  Informed consent was obtained.      Procedure  The patient was brought to the procedure room and placed in supine position.  His scrotum was prepped with Hibiclens and he was draped in a sterile fashion.  A timeout was performed.    Lidocaine 2% was used to anesthetize the scrotal skin as well as perivasal sheath initially on the patient's left.  A 1 cm skin incision was created with the sharp-tip mosquito and a vas clamp utilized through this incision to grasp the vas.  The left vas was elevated to the skin surface and dissected free of its perivasal sheath.  The vas was transected and the lumen was cauterized both proximally and distally.  A 4-0 chromic suture was utilized to place the proximal vasal portion under the perivasal sheath, such that the 2 ends were divided by the perivasal sheath (fascial interposition).  This portion of the vas was then allowed to drop back into the left hemiscrotum.  The right side was treated next in the same manner as described above.  The skin incision was closed with the 4-0 chromic suture.  The patient tolerated the procedure well.    It was again reiterated to the patient that he would remain fertile for sometime and should present to the office for a post-vacectomy semen analysis to confirm sterility in 3 months.  The patient again expressed understanding.

## 2018-03-26 NOTE — NURSING NOTE
Vasectomy  Per verbal order read back by Cuauhtemoc Aguirre MD to prep patient for vasectomy.  Patient positioned in supine position, perineum area prepped with chlorhexidene Gluconate and patient draped per sterile technique.    Lidocaine 2%  Lot #: -[id number]  Expiration date: 8/1/2019  : [employer]  NDC: 2372-1097-21    [facility] Protocol    A. Pre-procedure verification complete Yes  1-relevant information / documentation available, reviewed and properly matched to the patient; 2-consent accurate and complete, 3-equipment and supplies available    B. Site marking complete N/A  Site marked if not in continuous attendance with patient    C. TIME OUT completed Yes  Time Out was conducted just prior to starting procedure to verify the eight required elements: 1-patient identity, 2-consent accurate and complete, 3-position, 4-correct side/site marked (if applicable), 5-procedure, 6-relevant images / results properly labeled and displayed (if applicable), 7-antibiotics / irrigation fluids (if applicable), 8-safety precautions.    After procedure perineum area rinsed. Semen analysis container given to patient. Patient reminded to have a semen analysis performed 3 months after the procedure to confirm sterility and to ejaculate about 1-2 dozen times following the vasectomy and prior to semen collection. Discharge instructions reviewed with patient. Patient verbalized understanding of discharge instructions and discharged ambulatory.

## 2018-03-26 NOTE — PATIENT INSTRUCTIONS
Home Care after Vasectomy   Follow these guidelines for your care after your surgery to help your recovery.     Activity   Limit your activity for the first 5 days after the procedure to light activity.   You may return to work typically in 2 days.   Limit lifting, pushing or pulling to less than 20 pounds until you are no longer sore.   Limit running and long walks until you are no longer sore.   Limit sexual activity for 5 days.     Swelling   Scrotal swelling from the surgery may take weeks to get better. You should call your doctor if the swelling is severe and the scrotum is larger than an orange.  Apply a bag of frozen peas to the scrotum for 15 minutes every 1-2 hours for the first 48 hours when you are awake to limit swelling. It works best to not apply the bag of frozen peas directly to the skin.  Wear a jock strap to support your scrotum and reduce swelling.  Continue wearing the jock strap until you are no longer sore, 1-2 weeks.     Incision care   The single stitch placed in the scrotum will dissolve and does not need to be removed.  A small amount of blood may stain the dressings for up to 2-3 days after the procedure.  For the first few days, apply two or three gauze pads to the site each day and as needed to keep the dressing dry. This will protect the incision and help keep your clothes clean.  When you are no longer having any drainage, stop using the gauze pads over the site.     Bathing or showering   You may shower after the procedure but do not scrub the stitch area.  Allow the water to wash over the stitch and do not scrub the area. Dry the site gently by patting it with a clean towel.  Tub baths should be avoided for 7 days after surgery.  Swimming should be avoided for 14 days after surgery.        Pain control   You were provided with a pain medication prescription during the clinic consultation, please use this as needed.  Also, please take ibuprofen as we discussed in clinic.  Pain most  often is eased after 5 to 7 days.     Sexual activity   Please avoid ejaculating for 5 days after procedure or until soreness is resolved.     Follow up   Following this procedure you are still considered fertile and would be able to impregnate your partner.  You should have a semen analysis performed 3 months or greater after your procedure to confirm sterility.  Ideally you would ejaculate about 2-3 dozen times following the vasectomy and prior to semen collection.  Use birth control until the semen analysis is confirmed sterile.     Use contraceptives until this is confirmed to prevent pregnancy.     Contacts   General Questions: [phone]   Appointments: [phone]   Emergencies: [phone]     When to call your doctor   Call the urology office if you have any of the following:   Severe swelling, larger than the size of an orange   A large amount of fluid drainage that soaks several pads per day   Pain that is not controlled with pain medicine, jock strap and use of frozen peas   Any signs of infection such as the following:   -Redness or tenderness around the incision site worsening after 2-3 days or   -Pus type drainage from the incision or   -Fever of greater than 101 degrees F     Also call the office if you have any questions or concerns about your care.

## 2018-03-26 NOTE — MR AVS SNAPSHOT
After Visit Summary   3/26/2018    Jese Joe    MRN: [id number]           Patient Information     Date Of Birth          1975        Visit Information        Provider Department      3/26/2018 11:45 AM Cuauhtemoc Aguirre MD [facility]        Today's Diagnoses     Encounter for vasectomy    -  1      Care Instructions    Home Care after Vasectomy   Follow these guidelines for your care after your surgery to help your recovery.     Activity   Limit your activity for the first 5 days after the procedure to light activity.   You may return to work typically in 2 days.   Limit lifting, pushing or pulling to less than 20 pounds until you are no longer sore.   Limit running and long walks until you are no longer sore.   Limit sexual activity for 5 days.     Swelling   Scrotal swelling from the surgery may take weeks to get better. You should call your doctor if the swelling is severe and the scrotum is larger than an orange.  Apply a bag of frozen peas to the scrotum for 15 minutes every 1-2 hours for the first 48 hours when you are awake to limit swelling. It works best to not apply the bag of frozen peas directly to the skin.  Wear a jock strap to support your scrotum and reduce swelling.  Continue wearing the jock strap until you are no longer sore, 1-2 weeks.     Incision care   The single stitch placed in the scrotum will dissolve and does not need to be removed.  A small amount of blood may stain the dressings for up to 2-3 days after the procedure.  For the first few days, apply two or three gauze pads to the site each day and as needed to keep the dressing dry. This will protect the incision and help keep your clothes clean.  When you are no longer having any drainage, stop using the gauze pads over the site.     Bathing or showering   You may shower after the procedure but do not scrub the stitch area.  Allow the water to wash over the stitch and do not scrub the area.  Dry the site gently by patting it with a clean towel.  Tub baths should be avoided for 7 days after surgery.  Swimming should be avoided for 14 days after surgery.        Pain control   You were provided with a pain medication prescription during the clinic consultation, please use this as needed.  Also, please take ibuprofen as we discussed in clinic.  Pain most often is eased after 5 to 7 days.     Sexual activity   Please avoid ejaculating for 5 days after procedure or until soreness is resolved.     Follow up   Following this procedure you are still considered fertile and would be able to impregnate your partner.  You should have a semen analysis performed 3 months or greater after your procedure to confirm sterility.  Ideally you would ejaculate about 2-3 dozen times following the vasectomy and prior to semen collection.  Use birth control until the semen analysis is confirmed sterile.     Use contraceptives until this is confirmed to prevent pregnancy.     Contacts   General Questions: [phone]   Appointments: [phone]   Emergencies: [phone]     When to call your doctor   Call the urology office if you have any of the following:   Severe swelling, larger than the size of an orange   A large amount of fluid drainage that soaks several pads per day   Pain that is not controlled with pain medicine, jock strap and use of frozen peas   Any signs of infection such as the following:   -Redness or tenderness around the incision site worsening after 2-3 days or   -Pus type drainage from the incision or   -Fever of greater than 101 degrees F     Also call the office if you have any questions or concerns about your care.           Follow-ups after your visit        Your next 10 appointments already scheduled     Mar 26, 2018 11:45 AM CDT   PROCEDURE with Cuauhtemoc Aguirre MD   [facility]    [address]  [place] [id number] [phone]          "     Who to contact     If you have questions or need follow up information about today's clinic visit or your schedule please contact [facility] directly at [phone].  Normal or non-critical lab and imaging results will be communicated to you by MyChart, letter or phone within 4 business days after the clinic has received the results. If you do not hear from us within 7 days, please contact the clinic through [employer] or phone. If you have a critical or abnormal lab result, we will notify you by phone as soon as possible.  Submit refill requests through [employer] or call your pharmacy and they will forward the refill request to us. Please allow 3 business days for your refill to be completed.          Additional Information About Your Visit        [employer] Information     [employer] gives you secure access to your electronic health record. If you see a primary care provider, you can also send messages to your care team and make appointments. If you have questions, please call your primary care clinic.  If you do not have a primary care provider, please call [phone] and they will assist you.        Care EveryWhere ID     This is your Care EveryWhere ID. This could be used by other organizations to access your [facility] medical records  [id number]        Your Vitals Were     Pulse Respirations Height BMI (Body Mass Index)          88 16 1.803 m (5' 11\") 26.3 kg/m2         Blood Pressure from Last 3 Encounters:   02/22/18 153/86   02/20/18 155/56   01/24/18 126/74    Weight from Last 3 Encounters:   03/26/18 85.5 kg (188 lb 9.6 oz)   02/20/18 86.2 kg (190 lb)   01/24/18 82.6 kg (182 lb)              Today, you had the following     No orders found for display       Primary Care Provider Office Phone # Fax #    Andrei Philip [phone]       [address] [place] [address]        Equal Access to Services     BRENNAN TELLO AH: nora Jones " gillian jessicachinedu heredialazaro dietrich. So [facility] [phone].    ATENCIÓN: Si nahum rodriguez, tiene a spangler disposición servicios gratuitos de asistencia lingüística. Sergio al [phone].    We comply with applicable federal civil rights laws and [place] laws. We do not discriminate on the basis of race, color, national origin, age, disability, sex, sexual orientation, or gender identity.            Thank you!     Thank you for choosing [facility] \[facility]\""  for your care. Our goal is always to provide you with excellent care. Hearing back from our patients is one way we can continue to improve our services. Please take a few minutes to complete the written survey that you may receive in the mail after your visit with us. Thank you!             Your Updated Medication List - Protect others around you: Learn how to safely use, store and throw away your medicines at www.disposemymeds.org.          This list is accurate as of 3/26/18 11:36 AM.  Always use your most recent med list.                   Brand Name Dispense Instructions for use Diagnosis    albuterol 108 (90 BASE) MCG/ACT Inhaler    PROAIR HFA/PROVENTIL HFA/VENTOLIN HFA    1 Inhaler    Inhale 2 puffs into the lungs every 4 hours as needed for shortness of breath / dyspnea or wheezing        ALPRAZolam 1 MG tablet    XANAX    1 tablet    Take 1/2 tablet 90 minutes prior to the procedure, may take an additional 1/2 tablet 45 minutes prior.  Must have a     Encounter for vasectomy       cetirizine 10 MG tablet    zyrTEC     Take 10 mg by mouth daily        cyclobenzaprine 10 MG tablet    FLEXERIL    60 tablet    Take 1 tablet (10 mg) by mouth 3 times daily as needed for muscle spasms    Cervicalgia, Neck pain       HYDROcodone-acetaminophen 5-325 MG per tablet    NORCO    60 tablet    TAKE 1 TABLET BY MOUTH EVERY 4 TO 6 HOURS AS NEEDED FOR PAIN    Neck pain       IBUPROFEN PO      Take 800 mg by mouth         ondansetron 4 MG ODT tab    ZOFRAN-ODT    20 tablet    DISSOLVE 1 TABLET IN MOUTH EVERY 6 HOURS AS NEEDED FOR NAUSEA    Nausea       TYLENOL PO      Take 1,000 mg by mouth

## 2018-05-16 ENCOUNTER — OFFICE VISIT (OUTPATIENT)
Dept: CHIROPRACTIC MEDICINE | Facility: OTHER | Age: 43
End: 2018-05-16
Attending: CHIROPRACTOR
Payer: COMMERCIAL

## 2018-05-16 DIAGNOSIS — M99.03 SEGMENTAL AND SOMATIC DYSFUNCTION OF LUMBAR REGION: Primary | ICD-10-CM

## 2018-05-16 DIAGNOSIS — M54.50 ACUTE BILATERAL LOW BACK PAIN WITHOUT SCIATICA: ICD-10-CM

## 2018-05-16 DIAGNOSIS — M99.02 SEGMENTAL AND SOMATIC DYSFUNCTION OF THORACIC REGION: ICD-10-CM

## 2018-05-16 DIAGNOSIS — M99.01 SEGMENTAL AND SOMATIC DYSFUNCTION OF CERVICAL REGION: ICD-10-CM

## 2018-05-16 PROCEDURE — 98941 CHIROPRACT MANJ 3-4 REGIONS: CPT | Mod: AT | Performed by: CHIROPRACTOR

## 2018-05-16 NOTE — MR AVS SNAPSHOT
After Visit Summary   5/16/2018    Jese Joe    MRN: [id number]           Patient Information     Date Of Birth          1975        Visit Information        Provider Department      5/16/2018 3:50 PM Moisés Judd [facility]  [facility]        Today's Diagnoses     Segmental and somatic dysfunction of lumbar region    -  1    Acute bilateral low back pain without sciatica        Segmental and somatic dysfunction of thoracic region        Segmental and somatic dysfunction of cervical region           Follow-ups after your visit        Who to contact     If you have questions or need follow up information about today's clinic visit or your schedule please contact  [facility] at [phone].  Normal or non-critical lab and imaging results will be communicated to you by [employer], letter or phone within 4 business days after the clinic has received the results. If you do not hear from us within 7 days, please contact the clinic through [employer] or phone. If you have a critical or abnormal lab result, we will notify you by phone as soon as possible.  Submit refill requests through [employer] or call your pharmacy and they will forward the refill request to us. Please allow 3 business days for your refill to be completed.          Additional Information About Your Visit        MyChart Information     [employer] gives you secure access to your electronic health record. If you see a primary care provider, you can also send messages to your care team and make appointments. If you have questions, please call your primary care clinic.  If you do not have a primary care provider, please call [phone] and they will assist you.        Care EveryWhere ID     This is your Care EveryWhere ID. This could be used by other organizations to access your [place] medical records  [id number]         Blood Pressure from Last 3 Encounters:   02/22/18 153/86   02/20/18 155/56   01/24/18  126/74    Weight from Last 3 Encounters:   03/26/18 188 lb 9.6 oz (85.5 kg)   02/20/18 190 lb (86.2 kg)   01/24/18 182 lb (82.6 kg)              We Performed the Following     CHIROPRAC MANIP,SPINAL,3-4 REGIONS        Primary Care Provider Office Phone # Fax #    Andrei Philip [phone]       [address] [place] [address]        Equal Access to Services     BRENNAN TELLO : Hadii aad ku hadasho Soomaali, waaxda luqadaha, qaybta kaalmada adeegyada, waxay idiin hayaan adeeg kharash la'arturon . So [facility] [phone].    ATENCIÓN: Si habla español, tiene a spangler disposición servicios gratuitos de asistencia lingüística. [facility] [phone].    We comply with applicable federal civil rights laws and [place] laws. We do not discriminate on the basis of race, color, national origin, age, disability, sex, sexual orientation, or gender identity.            Thank you!     Thank you for choosing  [facility]  for your care. Our goal is always to provide you with excellent care. Hearing back from our patients is one way we can continue to improve our services. Please take a few minutes to complete the written survey that you may receive in the mail after your visit with us. Thank you!             Your Updated Medication List - Protect others around you: Learn how to safely use, store and throw away your medicines at www.disposemymeds.org.          This list is accurate as of 5/16/18 11:59 PM.  Always use your most recent med list.                   Brand Name Dispense Instructions for use Diagnosis    albuterol 108 (90 Base) MCG/ACT Inhaler    PROAIR HFA/PROVENTIL HFA/VENTOLIN HFA    1 Inhaler    Inhale 2 puffs into the lungs every 4 hours as needed for shortness of breath / dyspnea or wheezing        ALPRAZolam 1 MG tablet    XANAX    1 tablet    Take 1/2 tablet 90 minutes prior to the procedure, may take an additional 1/2 tablet 45 minutes prior.  Must have a     Encounter for  vasectomy       cetirizine 10 MG tablet    zyrTEC     Take 10 mg by mouth daily        cyclobenzaprine 10 MG tablet    FLEXERIL    60 tablet    Take 1 tablet (10 mg) by mouth 3 times daily as needed for muscle spasms    Cervicalgia, Neck pain       * HYDROcodone-acetaminophen 5-325 MG per tablet    NORCO    60 tablet    TAKE 1 TABLET BY MOUTH EVERY 4 TO 6 HOURS AS NEEDED FOR PAIN    Neck pain       * HYDROcodone-acetaminophen 5-325 MG per tablet    NORCO    8 tablet    Take 1-2 tablets by mouth every 4 hours as needed Take 1-2 tablets by mouth every 4-6 hours as needed for pain    Encounter for vasectomy       IBUPROFEN PO      Take 800 mg by mouth        ondansetron 4 MG ODT tab    ZOFRAN-ODT    20 tablet    DISSOLVE 1 TABLET IN MOUTH EVERY 6 HOURS AS NEEDED FOR NAUSEA    Nausea       TYLENOL PO      Take 1,000 mg by mouth        * Notice:  This list has 2 medication(s) that are the same as other medications prescribed for you. Read the directions carefully, and ask your doctor or other care provider to review them with you.

## 2018-06-01 ENCOUNTER — HOSPITAL ENCOUNTER (EMERGENCY)
Facility: HOSPITAL | Age: 43
Discharge: HOME | End: 2018-06-01
Attending: PHYSICIAN ASSISTANT | Admitting: PHYSICIAN ASSISTANT
Payer: COMMERCIAL

## 2018-06-01 DIAGNOSIS — J02.0 ACUTE STREPTOCOCCAL PHARYNGITIS: ICD-10-CM

## 2018-06-01 LAB
DEPRECATED S PYO AG THROAT QL EIA: ABNORMAL
SPECIMEN SOURCE: ABNORMAL

## 2018-06-01 PROCEDURE — 96372 THER/PROPH/DIAG INJ SC/IM: CPT

## 2018-06-01 PROCEDURE — 87880 STREP A ASSAY W/OPTIC: CPT | Performed by: PHYSICIAN ASSISTANT

## 2018-06-01 PROCEDURE — 25000128 H RX IP 250 OP 636: Performed by: PHYSICIAN ASSISTANT

## 2018-06-01 PROCEDURE — 99214 OFFICE O/P EST MOD 30 MIN: CPT | Performed by: PHYSICIAN ASSISTANT

## 2018-06-01 PROCEDURE — G0463 HOSPITAL OUTPT CLINIC VISIT: HCPCS | Mod: 25

## 2018-06-01 RX ADMIN — PENICILLIN G BENZATHINE 1.2 MILLION UNITS: 1200000 INJECTION, SUSPENSION INTRAMUSCULAR at 20:08

## 2018-06-01 ASSESSMENT — ENCOUNTER SYMPTOMS
VOICE CHANGE: 0
TROUBLE SWALLOWING: 0
SORE THROAT: 1

## 2018-06-02 NOTE — ED PROVIDER NOTES
History     Chief Complaint   Patient presents with     Pharyngitis     POSTIVE STREP TEST     The history is provided by the patient.     Jese Joe is a 43 year old male who presented to the urgent care for evaluation of a sore throat.  Symptoms began yesterday.  No difficulty swallowing.  No difficulty breathing.    Problem List:    Patient Active Problem List    Diagnosis Date Noted     ACP (advance care planning) 07/31/2017     Priority: Medium     Advance Care Planning 7/31/2017: ACP Review of Chart / Resources Provided:  Reviewed chart for advance care plan.  Jese Joe has no plan or code status on file. Discussed available resources and provided with information.   Added by Indira Santoro                  Past Medical History:    No past medical history on file.    Past Surgical History:    Past Surgical History:   Procedure Laterality Date     NO SURGICAL HISTORY[         Family History:    Family History   Problem Relation Age of Onset     DIABETES Maternal Grandfather      HEART DISEASE Paternal Grandfather      MI       Social History:  Marital Status:   [2]  Social History   Substance Use Topics     Smoking status: Former Smoker     Packs/day: 1.00     Years: 4.00     Types: Cigarettes     Quit date: 5/23/2009     Smokeless tobacco: Never Used     Alcohol use Yes      Comment: occasionally        Medications:      Acetaminophen (TYLENOL PO)   albuterol (PROAIR HFA/PROVENTIL HFA/VENTOLIN HFA) 108 (90 BASE) MCG/ACT Inhaler   ALPRAZolam (XANAX) 1 MG tablet   cetirizine (ZYRTEC) 10 MG tablet   cyclobenzaprine (FLEXERIL) 10 MG tablet   HYDROcodone-acetaminophen (NORCO) 5-325 MG per tablet   HYDROcodone-acetaminophen (NORCO) 5-325 MG per tablet   IBUPROFEN PO   ondansetron (ZOFRAN-ODT) 4 MG disintegrating tablet         Review of Systems   HENT: Positive for sore throat. Negative for trouble swallowing and voice change.        Physical Exam          Physical Exam   Constitutional: He appears  well-developed and well-nourished.   Pulmonary/Chest: Effort normal.   Neurological: He is alert.   Skin: Skin is warm and dry.   Psychiatric: He has a normal mood and affect.   Nursing note and vitals reviewed.      ED Course     ED Course     Procedures               Critical Care time:  none               Results for orders placed or performed in visit on 06/01/18 (from the past 24 hour(s))   Rapid strep screen   Result Value Ref Range    Specimen Description Throat     Rapid Strep A Screen (A)      POSITIVE: Group A Streptococcal antigen detected by immunoassay.       Medications   penicillin G benzathine (BICILLIN L-A) injection 1.2 Million Units (not administered)       Assessments & Plan (with Medical Decision Making)   Strep positive.  Bicillin L-A at patient request.  Typical strep discharge instructions.  Return here if worse.    I have reviewed the nursing notes.    I have reviewed the findings, diagnosis, plan and need for follow up with the patient.       New Prescriptions    No medications on file       Final diagnoses:   Acute streptococcal pharyngitis       6/1/2018   [facility]     Juan A Laureano PA-C  06/01/18 2008

## 2018-06-04 ENCOUNTER — OFFICE VISIT (OUTPATIENT)
Dept: CHIROPRACTIC MEDICINE | Facility: OTHER | Age: 43
End: 2018-06-04
Attending: CHIROPRACTOR
Payer: COMMERCIAL

## 2018-06-04 DIAGNOSIS — M54.2 CERVICALGIA: ICD-10-CM

## 2018-06-04 DIAGNOSIS — M99.03 SEGMENTAL AND SOMATIC DYSFUNCTION OF LUMBAR REGION: ICD-10-CM

## 2018-06-04 DIAGNOSIS — M54.2 NECK PAIN: Primary | ICD-10-CM

## 2018-06-04 DIAGNOSIS — M99.01 SEGMENTAL AND SOMATIC DYSFUNCTION OF CERVICAL REGION: Primary | ICD-10-CM

## 2018-06-04 DIAGNOSIS — M99.02 SEGMENTAL AND SOMATIC DYSFUNCTION OF THORACIC REGION: ICD-10-CM

## 2018-06-04 PROCEDURE — 98941 CHIROPRACT MANJ 3-4 REGIONS: CPT | Mod: AT | Performed by: CHIROPRACTOR

## 2018-06-04 NOTE — MR AVS SNAPSHOT
After Visit Summary   6/4/2018    Jese Joe    MRN: [id number]           Patient Information     Date Of Birth          1975        Visit Information        Provider Department      6/4/2018 2:40 PM Moisés Judd DC  [facility]        Today's Diagnoses     Segmental and somatic dysfunction of cervical region    -  1    Cervicalgia        Segmental and somatic dysfunction of lumbar region        Segmental and somatic dysfunction of thoracic region           Follow-ups after your visit        Who to contact     If you have questions or need follow up information about today's clinic visit or your schedule please contact  [facility] directly at [phone].  Normal or non-critical lab and imaging results will be communicated to you by [employer], letter or phone within 4 business days after the clinic has received the results. If you do not hear from us within 7 days, please contact the clinic through [employer] or phone. If you have a critical or abnormal lab result, we will notify you by phone as soon as possible.  Submit refill requests through [employer] or call your pharmacy and they will forward the refill request to us. Please allow 3 business days for your refill to be completed.          Additional Information About Your Visit        MyChart Information     [employer] gives you secure access to your electronic health record. If you see a primary care provider, you can also send messages to your care team and make appointments. If you have questions, please call your primary care clinic.  If you do not have a primary care provider, please call [phone] and they will assist you.        Care EveryWhere ID     This is your Care EveryWhere ID. This could be used by other organizations to access your [place] medical records  [id number]         Blood Pressure from Last 3 Encounters:   02/22/18 153/86   02/20/18 155/56   01/24/18 126/74    Weight from Last 3 Encounters:    03/26/18 188 lb 9.6 oz (85.5 kg)   02/20/18 190 lb (86.2 kg)   01/24/18 182 lb (82.6 kg)              We Performed the Following     CHIROPRAC MANIP,SPINAL,3-4 REGIONS        Primary Care Provider Office Phone # Fax #    Andrei Philip [phone]       [address] [place] [address]        Equal Access to Services     BRENNAN TELLO : Hadii aad ku hadasho Soomaali, waaxda luqadaha, qaybta kaalmada adeegyada, waxay idiin hayaan adeeg kharash lajazmyn . So [facility] [phone].    ATENCIÓN: Si habla espgene, tiene a spangler disposición servicios gratuitos de asistencia lingüística. Llame al [phone].    We comply with applicable federal civil rights laws and [place] laws. We do not discriminate on the basis of race, color, national origin, age, disability, sex, sexual orientation, or gender identity.            Thank you!     Thank you for choosing  [facility]  for your care. Our goal is always to provide you with excellent care. Hearing back from our patients is one way we can continue to improve our services. Please take a few minutes to complete the written survey that you may receive in the mail after your visit with us. Thank you!             Your Updated Medication List - Protect others around you: Learn how to safely use, store and throw away your medicines at www.disposemymeds.org.          This list is accurate as of 6/4/18  3:50 PM.  Always use your most recent med list.                   Brand Name Dispense Instructions for use Diagnosis    albuterol 108 (90 Base) MCG/ACT Inhaler    PROAIR HFA/PROVENTIL HFA/VENTOLIN HFA    1 Inhaler    Inhale 2 puffs into the lungs every 4 hours as needed for shortness of breath / dyspnea or wheezing        ALPRAZolam 1 MG tablet    XANAX    1 tablet    Take 1/2 tablet 90 minutes prior to the procedure, may take an additional 1/2 tablet 45 minutes prior.  Must have a     Encounter for vasectomy       cetirizine 10 MG tablet     zyrTEC     Take 10 mg by mouth daily        cyclobenzaprine 10 MG tablet    FLEXERIL    60 tablet    Take 1 tablet (10 mg) by mouth 3 times daily as needed for muscle spasms    Cervicalgia, Neck pain       * HYDROcodone-acetaminophen 5-325 MG per tablet    NORCO    60 tablet    TAKE 1 TABLET BY MOUTH EVERY 4 TO 6 HOURS AS NEEDED FOR PAIN    Neck pain       * HYDROcodone-acetaminophen 5-325 MG per tablet    NORCO    8 tablet    Take 1-2 tablets by mouth every 4 hours as needed Take 1-2 tablets by mouth every 4-6 hours as needed for pain    Encounter for vasectomy       IBUPROFEN PO      Take 800 mg by mouth        ondansetron 4 MG ODT tab    ZOFRAN-ODT    20 tablet    DISSOLVE 1 TABLET IN MOUTH EVERY 6 HOURS AS NEEDED FOR NAUSEA    Nausea       TYLENOL PO      Take 1,000 mg by mouth        * Notice:  This list has 2 medication(s) that are the same as other medications prescribed for you. Read the directions carefully, and ask your doctor or other care provider to review them with you.

## 2018-06-05 RX ORDER — HYDROCODONE BITARTRATE AND ACETAMINOPHEN 5; 325 MG/1; MG/1
TABLET ORAL
Qty: 60 TABLET | Refills: 0 | Status: SHIPPED | OUTPATIENT
Start: 2018-06-05 | End: 2018-11-20

## 2018-06-05 NOTE — TELEPHONE ENCOUNTER
Controlled Substance Refill Request for Norco  Problem List Complete:  No     PROVIDER TO CONSIDER COMPLETION OF PROBLEM LIST AND OVERVIEW/CONTROLLED SUBSTANCE AGREEMENT    Last Written Prescription Date:  2/21/18  Last Fill Quantity: 60,   # refills: 0    Last Office Visit with [facility] primary care provider: 1/24/18    Future Office visit:     Controlled substance agreement on file: No.     Processing:  Staff will hand deliver Rx to on-site pharmacy

## 2018-06-13 NOTE — TELEPHONE ENCOUNTER
Per pt request the written RX sent to [facility] via . Will be there 06/14/18 in the afternoon.

## 2018-06-14 NOTE — ED AVS SNAPSHOT
[facility]    [address] [address]    Phone:  [phone]                                       Jese Joe   MRN: [id number]    Department[facility]  [facility]   Date of Visit:  1/13/2018           Patient Information     Date Of Birth          1975        Your diagnoses for this visit were:     Acute streptococcal pharyngitis        You were seen by Haley Simon PA.      Follow-up Information     Follow up with [facility].    Specialty:  EMERGENCY MEDICINE    Why:  If symptoms worsen over the weekend    Contact information:    [address] [address] [phone]    Additional information:    From [facility]: Take [address]. Turn left at [address]. Turn left at the first stoplight on [address]. At the first stop sign, take a right onto [address]. Take a left into the parking lot and continue through until you reach the North enterance of the building.       From [place]: Take [address]. Take the [address] ramp towards [place]. Turn left onto [address]. Take a slight right onto [address]. Turn left at the first stoplight on [address]. At the first stop sign, take a right onto [address]. Take a left into the parking lot and continue through until you reach the North enterance of the building.       From [place]: Take [address]. Take a right at [address]. At the first stop sign, take a right onto [address]. Take a left into the parking lot and continue through until you reach the North enterance of the building.       Discharge References/Attachments     PHARYNGITIS, STREP (CONFIRMED) (ENGLISH)    SORE THROATS, SELF-CARE FOR (ENGLISH)         Review of your medicines      Our records show that you are taking the medicines listed below. If these are incorrect, please call your family doctor or clinic.        Dose /  <RodMohit - Last Filed: 06/14/18 09:44>





Subjective





- Date & Time of Evaluation


Date of Evaluation: 06/14/18


Time of Evaluation: 09:41





- Subjective


Subjective: 





Surgery 





Pt seen and examined. No acute events. PICC placed yesterday. Denies fever, 

nausea, vomiting, CP, SOB. Denies flatus, BM. Voiding freely. OOB to void. Pain 

controlled. 





Objective





- Vital Signs/Intake and Output


Vital Signs (last 24 hours): 


 











Temp Pulse Resp BP Pulse Ox


 


 98.7 F   95 H  18   109/63   93 L


 


 06/14/18 07:30  06/14/18 08:03  06/14/18 07:30  06/14/18 07:30  06/14/18 07:30








Intake and Output: 


 











 06/14/18 06/14/18





 06:59 18:59


 


Intake Total 828 


 


Output Total 1805 420


 


Balance -977 -420














- Medications


Medications: 


 Current Medications





Albuterol/Ipratropium (Duoneb 3 Mg/0.5 Mg (3 Ml) Ud)  3 ml INH RQ4 PRN


   PRN Reason: Shortness of Breath


Benzocaine/Menthol (Cepacol Sore Throat)  1 emmanuel MT Q4 PRN


   PRN Reason: Sore Throat


   Last Admin: 06/06/18 13:06 Dose:  1 emmanuel


Heparin Sodium (Porcine) (Heparin)  5,000 units SC Q8 KATIE


   Last Admin: 06/14/18 05:44 Dose:  5,000 units


Hydromorphone HCl (Dilaudid)  0.5 mg IVP Q2 PRN


   PRN Reason: Pain, severe (8-10)


   Last Admin: 06/14/18 00:17 Dose:  0.5 mg


Piperacillin Sod/Tazobactam (Sod 3.375 gm/ Sodium Chloride)  100 mls @ 200 mls/

hr IVPB Q6H KATIE


   PRN Reason: Protocol


   Last Admin: 06/14/18 03:35 Dose:  200 mls/hr


BUPIVACAINE 0.125%/0.9% NACL (Bupivacaine-Ns 0.125% On-Q )  600 mls @ 4 mls/

hr IJ ONCE ONE


   Stop: 06/17/18 20:59


Metronidazole (Flagyl)  500 mg in 100 mls @ 100 mls/hr IVPB Q8H KATIE


   PRN Reason: Protocol


   Last Admin: 06/14/18 06:30 Dose:  100 mls/hr


Fat Emulsion Intravenous (Intralipid 20%)  500 mls @ 60 mls/hr IV QOD@1800 [facility]


   Stop: 06/18/18 18:01


   Last Admin: 06/12/18 18:02 Dose:  60 mls/hr


Sodium Chloride 15 meq/Magnesium Sulfate 3 meq/Calcium Gluconate 4.5 meq/

Multivitamins/Vitamin C 10 ml/Amino Acids  1,024.1663 mls @ 63 mls/hr IV 

.[id number] [facility]


   Stop: 06/14/18 10:15


   Last Admin: 06/13/18 19:34 Dose:  63 mls/hr


Sodium Chloride 15 meq/Magnesium Sulfate 3 meq/Calcium Gluconate 4.5 meq/Amino 

Acids  1,014.1663 mls @ 63 mls/hr IV .[id number] [facility]


   Stop: 06/14/18 17:59


BUPIVACAINE 0.125%/0.9% NACL (Bupivacaine-Ns 0.125% On-Q )  600 mls @ 4 mls/

hr IJ ONCE ONE


   Stop: 06/19/18 23:16


   Last Admin: 06/13/18 22:47 Dose:  4 mls/hr


Lactated Ringer's (Lactated Ringer's)  1,000 mls @ 20 mls/hr IV .Q24H [facility]


   Last Admin: 06/14/18 08:27 Dose:  Not Given


Ketorolac Tromethamine (Toradol)  15 mg IVP Q6 PRN


   PRN Reason: Pain, moderate (4-7)


Lidocaine (Lidoderm)  1 ea TD DAILY@0800 [facility]


   Last Admin: 06/14/18 08:34 Dose:  1 ea


Metoclopramide HCl (Reglan)  5 mg IVP Q6 [facility]


   Last Admin: 06/14/18 05:44 Dose:  5 mg


Ondansetron HCl (Zofran Inj)  4 mg IV Q4 PRN


   PRN Reason: Nausea/Vomiting


   Last Admin: 06/12/18 09:24 Dose:  4 mg


Pantoprazole Sodium (Protonix Inj)  40 mg IVP DAILY [facility]


   Last Admin: 06/13/18 10:26 Dose:  40 mg











- Labs


Labs: 


 





 06/14/18 07:09 





 06/14/18 07:09 





 











PT  12.0 SECONDS (9.7-12.2)   06/11/18  06:15    


 


INR  1.1   06/11/18  06:15    


 


APTT  24 SECONDS (21-34)  D 06/11/18  13:52    














- Constitutional


Appears: No Acute Distress





- Head Exam


Head Exam: ATRAUMATIC, NORMAL INSPECTION, NORMOCEPHALIC





- Eye Exam


Eye Exam: EOMI, Normal appearance, PERRL


Pupil Exam: NORMAL ACCOMODATION, PERRL





- ENT Exam


ENT Exam: Mucous Membranes Moist, Normal Exam





- Neck Exam


Neck Exam: Full ROM, Normal Inspection.  absent: Lymphadenopathy





- Respiratory Exam


Respiratory Exam: Clear to Ausculation Bilateral, NORMAL BREATHING PATTERN





- Cardiovascular Exam


Cardiovascular Exam: REGULAR RHYTHM, +S1, +S2.  absent: Murmur





- GI/Abdominal Exam


GI & Abdominal Exam: Soft, Tenderness, Normal Bowel Sounds.  absent: Distended, 

Firm, Guarding, Rigid


Additional comments: 





Incision open. MIld drainage. Wet and dry dressing applied. Stapled. 





-  Exam


 Exam: NORMAL INSPECTION





- Extremities Exam


Extremities Exam: Full ROM, Normal Capillary Refill, Normal Inspection.  absent

: Joint Swelling, Pedal Edema





- Back Exam


Back Exam: NORMAL INSPECTION





- Neurological Exam


Neurological Exam: Alert, Awake, CN II-XII Intact, Normal Gait, Oriented x3





- Psychiatric Exam


Psychiatric exam: Normal Affect, Normal Mood





- Skin


Skin Exam: Warm





Assessment and Plan





- Assessment and Plan (Free Text)


Assessment: 





POD 14 colectomy


POD 3 exploratory laparotomy , colectomy





-MOnitor Bowelk function 


-TPN 


-ABX


-DVT/ GI ppx


-pain controll 


-REpete electrolyte PRN 


-IS/ OOB / ambulate 





DW Dr. Villalobos 





<Jas Villalobos - Last Filed: 06/15/18 02:46>





Subjective





- Subjective


Subjective: 


I personally saw and examined the patient at bedside this am with resident 

staff and again this afternoon independently. Mr. Cabrera is slowly improving in 

terms of pain, hemodynamics and infectious markers. Abd soft, wound clean with 

packing, serosang KENDRA drainage. Continue current course and await bowel 

function. 








Objective





- Vital Signs/Intake and Output


Vital Signs (last 24 hours): 


 











Temp Pulse Resp BP Pulse Ox


 


 98.1 F   85   20   148/72   97 


 


 06/14/18 23:50  06/15/18 02:18  06/14/18 23:50  06/14/18 23:50  06/14/18 23:50








Intake and Output: 


 











 06/14/18 06/15/18





 18:59 06:59


 


Intake Total 893 1110


 


Output Total 1135 545


 


Balance -242 565














- Medications


Medications: 


 Current Medications





Albuterol/Ipratropium (Duoneb 3 Mg/0.5 Mg (3 Ml) Ud)  3 ml INH RQ4 PRN


   PRN Reason: Shortness of Breath


Benzocaine/Menthol (Cepacol Sore Throat)  1 emmanuel MT Q4 PRN


   PRN Reason: Sore Throat


   Last Admin: 06/06/18 13:06 Dose:  1 emmanuel


Diphenhydramine HCl (Benadryl)  50 mg IVP HS PRN


   PRN Reason: Insomnia


   Last Admin: 06/14/18 21:55 Dose:  50 mg


Heparin Sodium (Porcine) (Heparin)  5,000 units SC Q8 KATIE


   Last Admin: 06/14/18 21:55 Dose:  5,000 units


Hydromorphone HCl (Dilaudid)  0.5 mg IVP Q2 PRN


   PRN Reason: Pain, severe (8-10)


   Last Admin: 06/15/18 02:05 Dose:  0.5 mg


Piperacillin Sod/Tazobactam (Sod 3.375 gm/ Sodium Chloride)  100 mls @ 200 mls/

hr IVPB Q6H KATIE


   PRN Reason: Protocol


   Last Admin: 06/14/18 22:03 Dose:  200 mls/hr


BUPIVACAINE 0.125%/0.9% NACL (Bupivacaine-Ns 0.125% On-Q )  600 mls @ 4 mls/

hr IJ ONCE ONE


   Stop: 06/17/18 20:59


Metronidazole (Flagyl)  500 mg in 100 mls @ 100 mls/hr IVPB Q8H KATIE


   PRN Reason: Protocol


   Last Admin: 06/15/18 00:00 Dose:  100 mls/hr


BUPIVACAINE 0.125%/0.9% NACL (Bupivacaine-Ns 0.125% On-Q )  600 mls @ 4 mls/

hr IJ ONCE ONE


   Stop: 06/19/18 23:16


   Last Admin: 06/13/18 22:47 Dose:  4 mls/hr


Lactated Ringer's (Lactated Ringer's)  1,000 mls @ 20 mls/hr IV .Q24H [facility]


   Last Admin: 06/14/18 08:27 Dose:  Not Given


Sodium Chloride 15 meq/Potassium Phosphate 15 mmole/Magnesium Sulfate 3 meq/

Calcium Gluconate 4.6 meq/Heparin Sodium (Porcine) 1,000 units/ Chromium/Copper/

Manganese/Zinc 1 ml/ Amino Acids  1,021.3813 mls @ 80 mls/hr IV .[id number] [facility]


   Stop: 06/15/18 17:59


Ketorolac Tromethamine (Toradol)  15 mg IVP Q6 PRN


   PRN Reason: Pain, moderate (4-7)


Ketorolac Tromethamine (Toradol)  15 mg IVP Q6 [facility]


   Stop: 06/15/18 06:01


   Last Admin: 06/15/18 00:04 Dose:  15 mg


Lidocaine (Lidoderm)  1 ea TD DAILY@0800 [facility]


   Last Admin: 06/14/18 08:34 Dose:  1 ea


Lidocaine (Lidoderm)  1 ea TD DAILY [facility]


   Last Admin: 06/14/18 17:59 Dose:  1 ea


Metoclopramide HCl (Reglan)  5 mg IVP Q6 [facility]


   Last Admin: 06/15/18 00:02 Dose:  5 mg


Ondansetron HCl (Zofran Inj)  4 mg IV Q4 PRN


   PRN Reason: Nausea/Vomiting


   Last Admin: 06/12/18 09:24 Dose:  4 mg


Pantoprazole Sodium (Protonix Inj)  40 mg IVP DAILY [facility]


   Last Admin: 06/14/18 11:21 Dose:  40 mg











- Labs


Labs: 


 





 06/14/18 07:09 





 06/14/18 07:09 





 











PT  12.0 SECONDS (9.7-12.2)   06/11/18  06:15    


 


INR  1.1   06/11/18  06:15    


 


APTT  24 SECONDS (21-34)  D 06/11/18  13:52 Directions Last dose taken    albuterol 108 (90 BASE) MCG/ACT Inhaler   Commonly known as:  PROAIR HFA/PROVENTIL HFA/VENTOLIN HFA   Dose:  2 puff   Quantity:  1 Inhaler        Inhale 2 puffs into the lungs every 4 hours as needed for shortness of breath / dyspnea or wheezing   Refills:  0        cyclobenzaprine 10 MG tablet   Commonly known as:  FLEXERIL   Dose:  10 mg   Quantity:  60 tablet        Take 1 tablet (10 mg) by mouth 3 times daily as needed for muscle spasms   Refills:  0        HYDROcodone-acetaminophen 5-325 MG per tablet   Commonly known as:  NORCO   Quantity:  60 tablet        TAKE 1 TABLET BY MOUTH EVERY 4 TO 6 HOURS AS NEEDED FOR PAIN   Refills:  0        IBUPROFEN PO   Dose:  800 mg        Take 800 mg by mouth   Refills:  0        ondansetron 4 MG ODT tab   Commonly known as:  ZOFRAN-ODT   Quantity:  20 tablet        DISSOLVE 1 TABLET IN MOUTH EVERY 6 HOURS AS NEEDED FOR NAUSEA   Refills:  0        predniSONE 20 MG tablet   Commonly known as:  DELTASONE   Dose:  40 mg   Quantity:  8 tablet        Take 2 tablets (40 mg) by mouth daily for 4 days   Refills:  0        TYLENOL PO   Dose:  1000 mg        Take 1,000 mg by mouth   Refills:  0                Orders Needing Specimen Collection     None      Pending Results     No orders found from 1/11/2018 to 1/14/2018.            Pending Culture Results     No orders found from 1/11/2018 to 1/14/2018.            Thank you for choosing [facility]       Thank you for choosing [facility] for your care. Our goal is always to provide you with excellent care. Hearing back from our patients is one way we can continue to improve our services. Please take a few minutes to complete the written survey that you may receive in the mail after you visit with us. Thank you!        [employer] Information     [employer] gives you secure access to your electronic health record. If you see a primary care provider, you can also send messages to your care team and make appointments. If  you have questions, please call your primary care clinic.  If you do not have a primary care provider, please call [phone] and they will assist you.        Care EveryWhere ID     This is your Care EveryWhere ID. This could be used by other organizations to access your [facility] medical records  [id number]        Equal Access to Services     BRENNAN TELLO : Yulissa Lala, nora french, lazaro perera. So [facility] [phone].    ATENCIÓN: Si habla español, tiene a spangler disposición servicios gratuitos de asistencia lingüística. Llame al [phone].    We comply with applicable federal civil rights laws and [place] laws. We do not discriminate on the basis of race, color, national origin, age, disability, sex, sexual orientation, or gender identity.            After Visit Summary       This is your record. Keep this with you and show to your community pharmacist(s) and doctor(s) at your next visit.

## 2018-06-24 ENCOUNTER — HOSPITAL ENCOUNTER (EMERGENCY)
Facility: HOSPITAL | Age: 43
Discharge: HOME | End: 2018-06-24
Attending: NURSE PRACTITIONER | Admitting: NURSE PRACTITIONER
Payer: COMMERCIAL

## 2018-06-24 DIAGNOSIS — R11.0 NAUSEA: ICD-10-CM

## 2018-06-24 DIAGNOSIS — J03.90 TONSILLITIS: ICD-10-CM

## 2018-06-24 LAB
DEPRECATED S PYO AG THROAT QL EIA: NORMAL
SPECIMEN SOURCE: NORMAL

## 2018-06-24 PROCEDURE — G0463 HOSPITAL OUTPT CLINIC VISIT: HCPCS

## 2018-06-24 PROCEDURE — 25000125 ZZHC RX 250: Performed by: NURSE PRACTITIONER

## 2018-06-24 PROCEDURE — 87081 CULTURE SCREEN ONLY: CPT | Performed by: FAMILY MEDICINE

## 2018-06-24 PROCEDURE — 25000132 ZZH RX MED GY IP 250 OP 250 PS 637: Performed by: NURSE PRACTITIONER

## 2018-06-24 PROCEDURE — 87880 STREP A ASSAY W/OPTIC: CPT | Performed by: FAMILY MEDICINE

## 2018-06-24 PROCEDURE — 99213 OFFICE O/P EST LOW 20 MIN: CPT | Performed by: NURSE PRACTITIONER

## 2018-06-24 RX ORDER — ONDANSETRON 4 MG/1
4 TABLET, ORALLY DISINTEGRATING ORAL ONCE
Status: COMPLETED | OUTPATIENT
Start: 2018-06-24 | End: 2018-06-24

## 2018-06-24 RX ORDER — CEFDINIR 300 MG/1
300 CAPSULE ORAL 2 TIMES DAILY
Qty: 13 CAPSULE | Refills: 0 | Status: SHIPPED | OUTPATIENT
Start: 2018-06-24 | End: 2018-07-01

## 2018-06-24 RX ORDER — CEFDINIR 300 MG/1
300 CAPSULE ORAL ONCE
Status: COMPLETED | OUTPATIENT
Start: 2018-06-24 | End: 2018-06-24

## 2018-06-24 RX ADMIN — CEFDINIR 300 MG: 300 CAPSULE ORAL at 10:22

## 2018-06-24 RX ADMIN — ONDANSETRON 4 MG: 4 TABLET, ORALLY DISINTEGRATING ORAL at 10:22

## 2018-06-24 ASSESSMENT — ENCOUNTER SYMPTOMS
TROUBLE SWALLOWING: 0
CONSTITUTIONAL NEGATIVE: 1
SORE THROAT: 1
RHINORRHEA: 0
COUGH: 0

## 2018-06-24 NOTE — ED AVS SNAPSHOT
[facility]    [address] [address]    Phone:  [phone]                                       Jese Joe   MRN: [id number]    Department[facility]   Date of Visit:  6/24/2018           Patient Information     Date Of Birth          1975        Your diagnoses for this visit were:     Tonsillitis     Nausea        You were seen by Ryanne Philip NP.      Follow-up Information     Follow up with Andrei Philip MD.    Specialty:  Family Practice    Why:  As needed    Contact information:    [address]  [facility] [address] [phone]          Follow up with [facility].    Specialty:  EMERGENCY MEDICINE    Why:  If symptoms worsen    Contact information:    [address] [address] [phone]    Additional information:    From [facility] ([place], [place]): Take [address]. Turn left at [address]. Turn left at the first stoplight on [address]. At the first stop sign, take a right onto [address]. Take a left into the parking lot and continue through until you reach the North enterance of the building.       From [place]: Take [address]. Take the [address] ramp towards [place]. Turn left onto [address]. Take a slight right onto [address]. Turn left at the first stoplight on [address]. At the first stop sign, take a right onto [address]. Take a left into the parking lot and continue through until you reach the North enterance of the building.       From [place]: Take [address]. Take a right at [address]. At the first stop sign, take a right onto [address]. Take a left into the parking lot and continue through until you reach the North enterance of the building.         Discharge Instructions                 Review of your medicines      START taking        Dose / Directions Last dose taken    cefdinir 300 MG capsule    Commonly known as:  OMNICEF   Dose:  300 mg   Quantity:  13 capsule        Take 1 capsule (300 mg) by mouth 2 times daily for 7 days   Refills:  0          Our records show that you are taking the medicines listed below. If these are incorrect, please call your family doctor or clinic.        Dose / Directions Last dose taken    albuterol 108 (90 Base) MCG/ACT Inhaler   Commonly known as:  PROAIR HFA/PROVENTIL HFA/VENTOLIN HFA   Dose:  2 puff   Quantity:  1 Inhaler        Inhale 2 puffs into the lungs every 4 hours as needed for shortness of breath / dyspnea or wheezing   Refills:  0        ALPRAZolam 1 MG tablet   Commonly known as:  XANAX   Quantity:  1 tablet        Take 1/2 tablet 90 minutes prior to the procedure, may take an additional 1/2 tablet 45 minutes prior.  Must have a    Refills:  0        cetirizine 10 MG tablet   Commonly known as:  zyrTEC   Dose:  10 mg        Take 10 mg by mouth daily   Refills:  0        cyclobenzaprine 10 MG tablet   Commonly known as:  FLEXERIL   Dose:  10 mg   Quantity:  60 tablet        Take 1 tablet (10 mg) by mouth 3 times daily as needed for muscle spasms   Refills:  0        * HYDROcodone-acetaminophen 5-325 MG per tablet   Commonly known as:  NORCO   Quantity:  60 tablet        TAKE 1 TABLET BY MOUTH EVERY 4 TO 6 HOURS AS NEEDED FOR PAIN   Refills:  0        * HYDROcodone-acetaminophen 5-325 MG per tablet   Commonly known as:  NORCO   Dose:  1-2 tablet   Quantity:  8 tablet        Take 1-2 tablets by mouth every 4 hours as needed Take 1-2 tablets by mouth every 4-6 hours as needed for pain   Refills:  0        * HYDROcodone-acetaminophen 5-325 MG per tablet   Commonly known as:  NORCO   Quantity:  60 tablet        TAKE 1 TABLET BY MOUTH EVERY 4 TO 6 HOURS AS NEEDED FOR PAIN   Refills:  0        IBUPROFEN PO   Dose:  800 mg        Take 800 mg by mouth   Refills:  0        ondansetron 4 MG ODT tab   Commonly known as:  ZOFRAN-ODT   Quantity:  20 tablet         DISSOLVE 1 TABLET IN MOUTH EVERY 6 HOURS AS NEEDED FOR NAUSEA   Refills:  0        TYLENOL PO   Dose:  1000 mg        Take 1,000 mg by mouth   Refills:  0        * Notice:  This list has 3 medication(s) that are the same as other medications prescribed for you. Read the directions carefully, and ask your doctor or other care provider to review them with you.            Prescriptions were sent or printed at these locations (1 Prescription)                   [facility] [address] [place] [address], [place] - [address] AT [facility] [address], [place] [address]    Telephone:  [phone]   Fax:  [phone]   Hours:                  E-Prescribed (1 of 1)         cefdinir (OMNICEF) 300 MG capsule                Procedures and tests performed during your visit     Beta strep group A culture    Rapid strep screen      Orders Needing Specimen Collection     None      Pending Results     Date and Time Order Name Status Description    6/24/2018 0948 Beta strep group A culture In process             Pending Culture Results     Date and Time Order Name Status Description    6/24/2018 0948 Beta strep group A culture In process             Thank you for choosing [facility]       Thank you for choosing [facility] for your care. Our goal is always to provide you with excellent care. Hearing back from our patients is one way we can continue to improve our services. Please take a few minutes to complete the written survey that you may receive in the mail after you visit with us. Thank you!        [employer] Information     [employer] gives you secure access to your electronic health record. If you see a primary care provider, you can also send messages to your care team and make appointments. If you have questions, please call your primary care clinic.  If you do not have a primary care provider, please call [phone] and they will assist you.        Care EveryWhere ID     This is your Care EveryWhere ID. This could be  used by other organizations to access your [facility] medical records  [id number]        Equal Access to Services     BRENNAN TELLO : Yulissa Lala, nora french, lazaro perera. So [facility] [phone].    ATENCIÓN: Si habla español, tiene a spangler disposición servicios gratuitos de asistencia lingüística. Llame al [phone].    We comply with applicable federal civil rights laws and [place] laws. We do not discriminate on the basis of race, color, national origin, age, disability, sex, sexual orientation, or gender identity.            After Visit Summary       This is your record. Keep this with you and show to your community pharmacist(s) and doctor(s) at your next visit.

## 2018-06-24 NOTE — ED AVS SNAPSHOT
[facility]    [address] [address]    Phone:  [phone]                                       Jese Joe   MRN: [id number]    [facility]  [facility]   Date of Visit:  6/24/2018           After Visit Summary Signature Page     I have received my discharge instructions, and my questions have been answered. I have discussed any challenges I see with this plan with the nurse or doctor.    ..........................................................................................................................................  Patient/Patient Representative Signature      ..........................................................................................................................................  Patient Representative Print Name and Relationship to Patient    ..................................................               ................................................  Date                                            Time    ..........................................................................................................................................  Reviewed by Signature/Title    ...................................................              ..............................................  Date                                                            Time

## 2018-06-25 NOTE — ED PROVIDER NOTES
"  History     Chief Complaint   Patient presents with     Pharyngitis     The history is provided by the patient. No  was used.     Jese Joe is a 43 year old male who presents with a sore throat and nausea since yesterday. Throat feels raw, \"tastes terrible\".   Hurts to swallow. No fever, no sinus drainage, no cough.     Problem List:    Patient Active Problem List    Diagnosis Date Noted     ACP (advance care planning) 07/31/2017     Priority: Medium     Advance Care Planning 7/31/2017: ACP Review of Chart / Resources Provided:  Reviewed chart for advance care plan.  Jese Joe has no plan or code status on file. Discussed available resources and provided with information.   Added by Indira Santoro                  Past Medical History:    History reviewed. No pertinent past medical history.    Past Surgical History:    Past Surgical History:   Procedure Laterality Date     NO SURGICAL HISTORY[         Family History:    Family History   Problem Relation Age of Onset     Diabetes Maternal Grandfather      HEART DISEASE Paternal Grandfather      MI       Social History:  Marital Status:   [2]  Social History   Substance Use Topics     Smoking status: Former Smoker     Packs/day: 1.00     Years: 4.00     Types: Cigarettes     Quit date: 5/23/2009     Smokeless tobacco: Never Used     Alcohol use Yes      Comment: occasionally      Medications:      cefdinir (OMNICEF) 300 MG capsule   Acetaminophen (TYLENOL PO)   albuterol (PROAIR HFA/PROVENTIL HFA/VENTOLIN HFA) 108 (90 BASE) MCG/ACT Inhaler   ALPRAZolam (XANAX) 1 MG tablet   cetirizine (ZYRTEC) 10 MG tablet   cyclobenzaprine (FLEXERIL) 10 MG tablet   HYDROcodone-acetaminophen (NORCO) 5-325 MG per tablet   HYDROcodone-acetaminophen (NORCO) 5-325 MG per tablet   HYDROcodone-acetaminophen (NORCO) 5-325 MG per tablet   IBUPROFEN PO   ondansetron (ZOFRAN-ODT) 4 MG disintegrating tablet     Review of Systems   Constitutional: Negative.  "   HENT: Positive for sore throat. Negative for congestion, rhinorrhea and trouble swallowing.    Respiratory: Negative for cough.    Skin: Negative.      Physical Exam      Physical Exam   Constitutional: He appears well-developed and well-nourished. He is active and cooperative. No distress.   HENT:   Head: Normocephalic and atraumatic.   Right Ear: Tympanic membrane normal.   Left Ear: Tympanic membrane normal.   Nose: Nose normal.   Mouth/Throat: Uvula is midline. Oropharyngeal exudate and posterior oropharyngeal erythema present. No posterior oropharyngeal edema or tonsillar abscesses.   Eyes: Conjunctivae and lids are normal. Right eye exhibits no discharge. Left eye exhibits no discharge.   Neck: Normal range of motion. Neck supple.   Cardiovascular: Normal rate, regular rhythm and normal heart sounds.    Pulmonary/Chest: Effort normal and breath sounds normal.   Neurological: He is alert.   Skin: He is not diaphoretic.   Nursing note and vitals reviewed.      ED Course     ED Course     Procedures    Results for orders placed or performed during the hospital encounter of 06/24/18 (from the past 24 hour(s))   Rapid strep screen   Result Value Ref Range    Specimen Description Throat     Rapid Strep A Screen       NEGATIVE: No Group A streptococcal antigen detected by immunoassay, await culture report.       Medications   cefdinir (OMNICEF) capsule 300 mg (300 mg Oral Given 6/24/18 1022)   ondansetron (ZOFRAN-ODT) ODT tab 4 mg (4 mg Oral Given 6/24/18 1022)       Assessments & Plan (with Medical Decision Making)     I have reviewed the nursing notes.  I have reviewed the findings, diagnosis, plan and need for follow up with the patient.  Patient with repeated exposure to strep.   Symptomatic with worsening symptoms.  Given Epic educational materials.   Discharge Medication List as of 6/24/2018 10:18 AM      START taking these medications    Details   cefdinir (OMNICEF) 300 MG capsule Take 1 capsule (300 mg) by  mouth 2 times daily for 7 days, Disp-13 capsule, R-0, E-Prescribe           Final diagnoses:   Tonsillitis   Nausea     Plan:   Warm salt water gargles several times per day  Ibuprofen and tylenol per package directions for pain/fever  Cepacol lozenges OTC per package directions  Increase fluids, wash hands frequently, rest  Return to ED/UC if symptoms increase or concerns develop  Follow up with your Primary Care provider if symptoms do not improve in 2-3 days      6/24/2018   [facility]     Ryanne Philip NP  06/24/18 0215

## 2018-06-26 LAB
BACTERIA SPEC CULT: NORMAL
SPECIMEN SOURCE: NORMAL

## 2018-07-17 ENCOUNTER — OFFICE VISIT (OUTPATIENT)
Dept: CHIROPRACTIC MEDICINE | Facility: OTHER | Age: 43
End: 2018-07-17
Attending: CHIROPRACTOR
Payer: COMMERCIAL

## 2018-07-17 DIAGNOSIS — M99.01 SEGMENTAL AND SOMATIC DYSFUNCTION OF CERVICAL REGION: ICD-10-CM

## 2018-07-17 DIAGNOSIS — M99.02 SEGMENTAL AND SOMATIC DYSFUNCTION OF THORACIC REGION: ICD-10-CM

## 2018-07-17 DIAGNOSIS — M54.50 ACUTE BILATERAL LOW BACK PAIN WITHOUT SCIATICA: ICD-10-CM

## 2018-07-17 DIAGNOSIS — M99.03 SEGMENTAL AND SOMATIC DYSFUNCTION OF LUMBAR REGION: Primary | ICD-10-CM

## 2018-07-17 PROCEDURE — 98941 CHIROPRACT MANJ 3-4 REGIONS: CPT | Mod: AT | Performed by: CHIROPRACTOR

## 2018-07-17 NOTE — MR AVS SNAPSHOT
After Visit Summary   7/17/2018    Jese Joe    MRN: [id number]           Patient Information     Date Of Birth          1975        Visit Information        Provider Department      7/17/2018 12:30 PM Moisés Judd DC  [facility]        Today's Diagnoses     Segmental and somatic dysfunction of lumbar region    -  1    Acute bilateral low back pain without sciatica        Segmental and somatic dysfunction of thoracic region        Segmental and somatic dysfunction of cervical region           Follow-ups after your visit        Who to contact     If you have questions or need follow up information about today's clinic visit or your schedule please contact  [facility] at [phone].  Normal or non-critical lab and imaging results will be communicated to you by Mobile Possehart, letter or phone within 4 business days after the clinic has received the results. If you do not hear from us within 7 days, please contact the clinic through Mobile Possehart or phone. If you have a critical or abnormal lab result, we will notify you by phone as soon as possible.  Submit refill requests through TierPM or call your pharmacy and they will forward the refill request to us. Please allow 3 business days for your refill to be completed.          Additional Information About Your Visit        MyChart Information     TierPM gives you secure access to your electronic health record. If you see a primary care provider, you can also send messages to your care team and make appointments. If you have questions, please call your primary care clinic.  If you do not have a primary care provider, please call [phone] and they will assist you.        Care EveryWhere ID     This is your Care EveryWhere ID. This could be used by other organizations to access your [facility] medical records  [id number]         Blood Pressure from Last 3 Encounters:   02/22/18 153/86   02/20/18 155/56   01/24/18  126/74    Weight from Last 3 Encounters:   03/26/18 188 lb 9.6 oz (85.5 kg)   02/20/18 190 lb (86.2 kg)   01/24/18 182 lb (82.6 kg)              We Performed the Following     CHIROPRAC MANIP,SPINAL,3-4 REGIONS        Primary Care Provider Office Phone # Fax #    Andrei Philip [phone]       [address] [place] [address]        Equal Access to Services     BRENNAN TELLO : Hadii aad ku hadasho Soomaali, waaxda luqadaha, qaybta kaalmada adeegyada, waxay idiin hayaan adeeg kharash la'eri . So [facility] [phone].    ATENCIÓN: Si habla español, tiene a spangler disposición servicios gratuitos de asistencia lingüística. [facility] [phone].    We comply with applicable federal civil rights laws and [place] laws. We do not discriminate on the basis of race, color, national origin, age, disability, sex, sexual orientation, or gender identity.            Thank you!     Thank you for choosing  [facility]  for your care. Our goal is always to provide you with excellent care. Hearing back from our patients is one way we can continue to improve our services. Please take a few minutes to complete the written survey that you may receive in the mail after your visit with us. Thank you!             Your Updated Medication List - Protect others around you: Learn how to safely use, store and throw away your medicines at www.disposemymeds.org.          This list is accurate as of 7/17/18 11:59 PM.  Always use your most recent med list.                   Brand Name Dispense Instructions for use Diagnosis    albuterol 108 (90 Base) MCG/ACT Inhaler    PROAIR HFA/PROVENTIL HFA/VENTOLIN HFA    1 Inhaler    Inhale 2 puffs into the lungs every 4 hours as needed for shortness of breath / dyspnea or wheezing        ALPRAZolam 1 MG tablet    XANAX    1 tablet    Take 1/2 tablet 90 minutes prior to the procedure, may take an additional 1/2 tablet 45 minutes prior.  Must have a     Encounter for  vasectomy       cetirizine 10 MG tablet    zyrTEC     Take 10 mg by mouth daily        cyclobenzaprine 10 MG tablet    FLEXERIL    60 tablet    Take 1 tablet (10 mg) by mouth 3 times daily as needed for muscle spasms    Cervicalgia, Neck pain       * HYDROcodone-acetaminophen 5-325 MG per tablet    NORCO    60 tablet    TAKE 1 TABLET BY MOUTH EVERY 4 TO 6 HOURS AS NEEDED FOR PAIN    Neck pain       * HYDROcodone-acetaminophen 5-325 MG per tablet    NORCO    8 tablet    Take 1-2 tablets by mouth every 4 hours as needed Take 1-2 tablets by mouth every 4-6 hours as needed for pain    Encounter for vasectomy       * HYDROcodone-acetaminophen 5-325 MG per tablet    NORCO    60 tablet    TAKE 1 TABLET BY MOUTH EVERY 4 TO 6 HOURS AS NEEDED FOR PAIN    Neck pain       IBUPROFEN PO      Take 800 mg by mouth        ondansetron 4 MG ODT tab    ZOFRAN-ODT    20 tablet    DISSOLVE 1 TABLET IN MOUTH EVERY 6 HOURS AS NEEDED FOR NAUSEA    Nausea       TYLENOL PO      Take 1,000 mg by mouth        * Notice:  This list has 3 medication(s) that are the same as other medications prescribed for you. Read the directions carefully, and ask your doctor or other care provider to review them with you.

## 2018-10-04 ENCOUNTER — APPOINTMENT (OUTPATIENT)
Dept: OCCUPATIONAL MEDICINE | Facility: OTHER | Age: 43
End: 2018-10-04

## 2018-10-10 DIAGNOSIS — M54.2 NECK PAIN: ICD-10-CM

## 2018-10-12 RX ORDER — HYDROCODONE BITARTRATE AND ACETAMINOPHEN 5; 325 MG/1; MG/1
TABLET ORAL
Qty: 60 TABLET | Refills: 0 | Status: SHIPPED | OUTPATIENT
Start: 2018-10-12 | End: 2018-11-20

## 2018-11-20 ENCOUNTER — OFFICE VISIT (OUTPATIENT)
Dept: FAMILY MEDICINE | Facility: OTHER | Age: 43
End: 2018-11-20
Attending: FAMILY MEDICINE
Payer: COMMERCIAL

## 2018-11-20 VITALS
HEIGHT: 72 IN | SYSTOLIC BLOOD PRESSURE: 124 MMHG | BODY MASS INDEX: 23.57 KG/M2 | WEIGHT: 174 LBS | HEART RATE: 77 BPM | TEMPERATURE: 97.4 F | DIASTOLIC BLOOD PRESSURE: 72 MMHG | OXYGEN SATURATION: 98 %

## 2018-11-20 DIAGNOSIS — G89.29 CHRONIC RIGHT SHOULDER PAIN: ICD-10-CM

## 2018-11-20 DIAGNOSIS — J98.01 BRONCHOSPASM: ICD-10-CM

## 2018-11-20 DIAGNOSIS — F41.9 ANXIETY: ICD-10-CM

## 2018-11-20 DIAGNOSIS — M54.2 NECK PAIN: ICD-10-CM

## 2018-11-20 DIAGNOSIS — Z11.4 SCREENING FOR HIV (HUMAN IMMUNODEFICIENCY VIRUS): ICD-10-CM

## 2018-11-20 DIAGNOSIS — M25.511 CHRONIC RIGHT SHOULDER PAIN: ICD-10-CM

## 2018-11-20 DIAGNOSIS — Z00.00 ROUTINE GENERAL MEDICAL EXAMINATION AT A HEALTH CARE FACILITY: Primary | ICD-10-CM

## 2018-11-20 LAB
ALBUMIN SERPL-MCNC: 4.4 G/DL (ref 3.4–5)
ALP SERPL-CCNC: 76 U/L (ref 40–150)
ALT SERPL W P-5'-P-CCNC: 27 U/L (ref 0–70)
ANION GAP SERPL CALCULATED.3IONS-SCNC: 8 MMOL/L (ref 3–14)
AST SERPL W P-5'-P-CCNC: 17 U/L (ref 0–45)
BILIRUB SERPL-MCNC: 0.7 MG/DL (ref 0.2–1.3)
BUN SERPL-MCNC: 16 MG/DL (ref 7–30)
CALCIUM SERPL-MCNC: 9 MG/DL (ref 8.5–10.1)
CHLORIDE SERPL-SCNC: 106 MMOL/L (ref 94–109)
CHOLEST SERPL-MCNC: 172 MG/DL
CO2 SERPL-SCNC: 25 MMOL/L (ref 20–32)
CREAT SERPL-MCNC: 0.86 MG/DL (ref 0.66–1.25)
GFR SERPL CREATININE-BSD FRML MDRD: >90 ML/MIN/1.7M2
GLUCOSE SERPL-MCNC: 86 MG/DL (ref 70–99)
HDLC SERPL-MCNC: 57 MG/DL
LDLC SERPL CALC-MCNC: 98 MG/DL
NONHDLC SERPL-MCNC: 115 MG/DL
POTASSIUM SERPL-SCNC: 4 MMOL/L (ref 3.4–5.3)
PROT SERPL-MCNC: 8.2 G/DL (ref 6.8–8.8)
SODIUM SERPL-SCNC: 139 MMOL/L (ref 133–144)
TRIGL SERPL-MCNC: 84 MG/DL

## 2018-11-20 PROCEDURE — 99000 SPECIMEN HANDLING OFFICE-LAB: CPT | Performed by: FAMILY MEDICINE

## 2018-11-20 PROCEDURE — 80053 COMPREHEN METABOLIC PANEL: CPT | Performed by: FAMILY MEDICINE

## 2018-11-20 PROCEDURE — 80061 LIPID PANEL: CPT | Performed by: FAMILY MEDICINE

## 2018-11-20 PROCEDURE — 87389 HIV-1 AG W/HIV-1&-2 AB AG IA: CPT | Mod: 90 | Performed by: FAMILY MEDICINE

## 2018-11-20 PROCEDURE — 99396 PREV VISIT EST AGE 40-64: CPT | Performed by: FAMILY MEDICINE

## 2018-11-20 PROCEDURE — 36415 COLL VENOUS BLD VENIPUNCTURE: CPT | Performed by: FAMILY MEDICINE

## 2018-11-20 RX ORDER — ALPRAZOLAM 0.5 MG
0.5 TABLET ORAL 2 TIMES DAILY PRN
Qty: 20 TABLET | Refills: 0 | Status: SHIPPED | OUTPATIENT
Start: 2018-11-20 | End: 2019-02-12

## 2018-11-20 RX ORDER — FLUTICASONE PROPIONATE 50 MCG
1-2 SPRAY, SUSPENSION (ML) NASAL DAILY
Qty: 1 BOTTLE | Refills: 11 | Status: SHIPPED | OUTPATIENT
Start: 2018-11-20 | End: 2020-02-11

## 2018-11-20 RX ORDER — ALBUTEROL SULFATE 90 UG/1
2 AEROSOL, METERED RESPIRATORY (INHALATION) EVERY 4 HOURS PRN
Qty: 1 INHALER | Refills: 0 | Status: SHIPPED | OUTPATIENT
Start: 2018-11-20 | End: 2019-11-10

## 2018-11-20 RX ORDER — HYDROCODONE BITARTRATE AND ACETAMINOPHEN 5; 325 MG/1; MG/1
TABLET ORAL
Qty: 60 TABLET | Refills: 0 | Status: ON HOLD | OUTPATIENT
Start: 2018-11-20 | End: 2019-03-28

## 2018-11-20 ASSESSMENT — ANXIETY QUESTIONNAIRES
IF YOU CHECKED OFF ANY PROBLEMS ON THIS QUESTIONNAIRE, HOW DIFFICULT HAVE THESE PROBLEMS MADE IT FOR YOU TO DO YOUR WORK, TAKE CARE OF THINGS AT HOME, OR GET ALONG WITH OTHER PEOPLE: NOT DIFFICULT AT ALL
1. FEELING NERVOUS, ANXIOUS, OR ON EDGE: SEVERAL DAYS
2. NOT BEING ABLE TO STOP OR CONTROL WORRYING: NOT AT ALL
5. BEING SO RESTLESS THAT IT IS HARD TO SIT STILL: NOT AT ALL
GAD7 TOTAL SCORE: 3
3. WORRYING TOO MUCH ABOUT DIFFERENT THINGS: NOT AT ALL
7. FEELING AFRAID AS IF SOMETHING AWFUL MIGHT HAPPEN: NOT AT ALL
6. BECOMING EASILY ANNOYED OR IRRITABLE: SEVERAL DAYS

## 2018-11-20 ASSESSMENT — PATIENT HEALTH QUESTIONNAIRE - PHQ9
5. POOR APPETITE OR OVEREATING: SEVERAL DAYS
SUM OF ALL RESPONSES TO PHQ QUESTIONS 1-9: 2

## 2018-11-20 ASSESSMENT — PAIN SCALES - GENERAL: PAINLEVEL: MILD PAIN (2)

## 2018-11-20 NOTE — PROGRESS NOTES
SUBJECTIVE:   CC: Jese Joe is an 43 year old male who presents for preventative health visit.     Healthy Habits:    Do you get at least three servings of calcium containing foods daily (dairy, green leafy vegetables, etc.)? yes    Amount of exercise or daily activities, outside of work: none    Problems taking medications regularly No    Medication side effects: No    Have you had an eye exam in the past two years? no    Do you see a dentist twice per year? yes    Do you have sleep apnea, excessive snoring or daytime drowsiness?no         Today's PHQ-2 Score:   PHQ-2 ( 1999 Pfizer) 5/23/2013   Q1: Little interest or pleasure in doing things 0   Q2: Feeling down, depressed or hopeless 0   PHQ-2 Score 0       Abuse: Current or Past(Physical, Sexual or Emotional)- No  Do you feel safe in your environment - Yes    Social History   Substance Use Topics     Smoking status: Former Smoker     Packs/day: 1.00     Years: 4.00     Types: Cigarettes     Quit date: 5/23/2009     Smokeless tobacco: Never Used     Alcohol use Yes      Comment: occasionally      If you drink alcohol do you typically have >3 drinks per day or >7 drinks per week? Yes - AUDIT SCORE:  8  AUDIT - Alcohol Use Disorders Identification Test - Reproduced from the World Health Organization Audit 2001 (Second Edition) 11/20/2018   1.  How often do you have a drink containing alcohol? 4 or more times a week   2.  How many drinks containing alcohol do you have on a typical day when you are drinking? 3 or 4   3.  How often do you have five or more drinks on one occasion? Weekly   4.  How often during the last year have you found that you were not able to stop drinking once you had started? Never   5.  How often during the last year have you failed to do what was normally expected of you because of drinking? Never   6.  How often during the last year have you needed a first drink in the morning to get yourself going after a heavy drinking session? Never  "  7.  How often during the last year have you had a feeling of guilt or remorse after drinking? Never   8.  How often during the last year have you been unable to remember what happened the night before because of your drinking? Never   9.  Have you or someone else been injured because of your drinking? No   10. Has a relative, friend, doctor or other health care worker been concerned about your drinking or suggested you cut down? No   TOTAL SCORE 8                         Last PSA: No results found for: PSA    Reviewed orders with patient. Reviewed health maintenance and updated orders accordingly - Yes  Labs reviewed in EPIC    Reviewed and updated as needed this visit by clinical staff  Tobacco  Allergies  Meds         Reviewed and updated as needed this visit by Provider        No past medical history on file.   Past Surgical History:   Procedure Laterality Date     NO SURGICAL HISTORY[         ROS:  CONSTITUTIONAL: NEGATIVE for fever, chills, change in weight  INTEGUMENTARY/SKIN: NEGATIVE for worrisome rashes, moles or lesions  EYES: NEGATIVE for vision changes or irritation  ENT: NEGATIVE for ear, mouth and throat problems  RESP: NEGATIVE for significant cough or SOB  CV: NEGATIVE for chest pain, palpitations or peripheral edema  GI: NEGATIVE for nausea, abdominal pain, heartburn, or change in bowel habits   male: negative for dysuria, hematuria, decreased urinary stream, erectile dysfunction, urethral discharge  MUSCULOSKELETAL: NEGATIVE for significant arthralgias or myalgia  NEURO: NEGATIVE for weakness, dizziness or paresthesias  PSYCHIATRIC: NEGATIVE for changes in mood or affect    OBJECTIVE:   /72  Pulse 77  Temp 97.4  F (36.3  C)  Ht 5' 11.75\" (1.822 m)  Wt 174 lb (78.9 kg)  SpO2 98%  BMI 23.76 kg/m2  EXAM:  GENERAL: healthy, alert and no distress  EYES: Eyes grossly normal to inspection, PERRL and conjunctivae and sclerae normal  HENT: ear canals and TM's normal, nose and mouth " "without ulcers or lesions  NECK: no adenopathy, no asymmetry, masses, or scars and thyroid normal to palpation  RESP: lungs clear to auscultation - no rales, rhonchi or wheezes  CV: regular rate and rhythm, normal S1 S2, no S3 or S4, no murmur, click or rub, no peripheral edema and peripheral pulses strong  ABDOMEN: soft, nontender, no hepatosplenomegaly, no masses and bowel sounds normal   (male): normal male genitalia without lesions or urethral discharge, no hernia  MS: no gross musculoskeletal defects noted, no edema  SKIN: no suspicious lesions or rashes  NEURO: Normal strength and tone, mentation intact and speech normal  PSYCH: mentation appears normal, affect normal/bright    Diagnostic Test Results:    ASSESSMENT/PLAN:       ICD-10-CM    1. Screening for HIV (human immunodeficiency virus) Z11.4 HIV Antigen Antibody Combo   2. Routine general medical examination at a health care facility Z00.00 Lipid Profile     Comprehensive metabolic panel   3. Chronic right shoulder pain M25.511     G89.29    4. Neck pain M54.2 HYDROcodone-acetaminophen (NORCO) 5-325 MG per tablet       COUNSELING:  Reviewed preventive health counseling, as reflected in patient instructions    BP Readings from Last 1 Encounters:   11/20/18 124/72     Estimated body mass index is 23.76 kg/(m^2) as calculated from the following:    Height as of this encounter: 5' 11.75\" (1.822 m).    Weight as of this encounter: 174 lb (78.9 kg).    BP Screening:   Last 3 BP Readings:    BP Readings from Last 3 Encounters:   11/20/18 124/72   02/22/18 153/86   02/20/18 155/56   he is going through relationship difficulties.  I gave him xanax and he will never combine with the lortab which he only uses for severe shoulder pain.  I did his crossbow permit for the shoulder.  He is going to have surgery on it in the future when he can make the time.  Full labs today.  F/u routine.      The following was recommended to the patient:  Re-screen BP within a year " and recommended lifestyle modifications       reports that he quit smoking about 9 years ago. His smoking use included Cigarettes. He has a 4.00 pack-year smoking history. He has never used smokeless tobacco.      Counseling Resources:  ATP IV Guidelines  Pooled Cohorts Equation Calculator  FRAX Risk Assessment  ICSI Preventive Guidelines  Dietary Guidelines for Americans, 2010  USDA's MyPlate  ASA Prophylaxis  Lung CA Screening    Andrei Philip MD  [facility]

## 2018-11-20 NOTE — MR AVS SNAPSHOT
After Visit Summary   11/20/2018    Jese Joe    MRN: [id number]           Patient Information     Date Of Birth          1975        Visit Information        Provider Department      11/20/2018 8:45 AM Andrei Philip MD [facility]        Today's Diagnoses     Routine general medical examination at a health care facility    -  1    Screening for HIV (human immunodeficiency virus)        Chronic right shoulder pain        Neck pain        Anxiety        Bronchospasm          Care Instructions      Preventive Health Recommendations  Male Ages 40 to 49    Yearly exam:             See your health care provider every year in order to  o   Review health changes.   o   Discuss preventive care.    o   Review your medicines if your doctor has prescribed any.    You should be tested each year for STDs (sexually transmitted diseases) if you re at risk.     Have a cholesterol test every 5 years.     Have a colonoscopy (test for colon cancer) if someone in your family has had colon cancer or polyps before age 50.     After age 45, have a diabetes test (fasting glucose). If you are at risk for diabetes, you should have this test every 3 years.      Talk with your health care provider about whether or not a prostate cancer screening test (PSA) is right for you.    Shots: Get a flu shot each year. Get a tetanus shot every 10 years.     Nutrition:    Eat at least 5 servings of fruits and vegetables daily.     Eat whole-grain bread, whole-wheat pasta and brown rice instead of white grains and rice.     Get adequate Calcium and Vitamin D.     Lifestyle    Exercise for at least 150 minutes a week (30 minutes a day, 5 days a week). This will help you control your weight and prevent disease.     Limit alcohol to one drink per day.     No smoking.     Wear sunscreen to prevent skin cancer.     See your dentist every six months for an exam and cleaning.              Follow-ups after your  "visit        Who to contact     If you have questions or need follow up information about today's clinic visit or your schedule please contact [facility] directly at [phone].  Normal or non-critical lab and imaging results will be communicated to you by MyChart, letter or phone within 4 business days after the clinic has received the results. If you do not hear from us within 7 days, please contact the clinic through MyChart or phone. If you have a critical or abnormal lab result, we will notify you by phone as soon as possible.  Submit refill requests through [employer] or call your pharmacy and they will forward the refill request to us. Please allow 3 business days for your refill to be completed.          Additional Information About Your Visit        [employer] Information     [employer] gives you secure access to your electronic health record. If you see a primary care provider, you can also send messages to your care team and make appointments. If you have questions, please call your primary care clinic.  If you do not have a primary care provider, please call [phone] and they will assist you.        Care EveryWhere ID     This is your Care EveryWhere ID. This could be used by other organizations to access your [facility] medical records  [id number]        Your Vitals Were     Pulse Temperature Height Pulse Oximetry BMI (Body Mass Index)       77 97.4  F (36.3  C) 5' 11.75\" (1.822 m) 98% 23.76 kg/m2        Blood Pressure from Last 3 Encounters:   11/20/18 124/72   02/22/18 153/86   02/20/18 155/56    Weight from Last 3 Encounters:   11/20/18 174 lb (78.9 kg)   03/26/18 188 lb 9.6 oz (85.5 kg)   02/20/18 190 lb (86.2 kg)              We Performed the Following     Comprehensive metabolic panel     HIV Antigen Antibody Combo     Lipid Profile          Today's Medication Changes          These changes are accurate as of 11/20/18  1:19 PM.  If you have any questions, ask your nurse or " doctor.               Start taking these medicines.        Dose/Directions    fluticasone 50 MCG/ACT spray   Commonly known as:  FLONASE   Used for:  Bronchospasm   Started by:  Andrei Philip MD        Dose:  1-2 spray   Spray 1-2 sprays into both nostrils daily   Quantity:  1 Bottle   Refills:  11         These medicines have changed or have updated prescriptions.        Dose/Directions    ALPRAZolam 0.5 MG tablet   Commonly known as:  XANAX   This may have changed:    - medication strength  - how much to take  - how to take this  - when to take this  - reasons to take this  - additional instructions   Used for:  Anxiety   Changed by:  Andrei Philip MD        Dose:  0.5 mg   Take 1 tablet (0.5 mg) by mouth 2 times daily as needed for anxiety   Quantity:  20 tablet   Refills:  0       HYDROcodone-acetaminophen 5-325 MG per tablet   Commonly known as:  NORCO   This may have changed:  Another medication with the same name was removed. Continue taking this medication, and follow the directions you see here.   Used for:  Neck pain   Changed by:  nAdrei Philip MD        TAKE 1 TABLET BY MOUTH EVERY 4 TO 6 HOURS AS NEEDED FOR PAIN   Quantity:  60 tablet   Refills:  0         Stop taking these medicines if you haven't already. Please contact your care team if you have questions.     cyclobenzaprine 10 MG tablet   Commonly known as:  FLEXERIL   Stopped by:  Andrei Philip MD                Where to get your medicines      These medications were sent to [facility] - [address]  [address] [place] [address]     Phone:  [phone]     albuterol 108 (90 Base) MCG/ACT inhaler    fluticasone 50 MCG/ACT spray         Some of these will need a paper prescription and others can be bought over the counter.  Ask your nurse if you have questions.     Bring a paper prescription for each of these medications     ALPRAZolam 0.5 MG tablet    HYDROcodone-acetaminophen 5-325 MG per  tablet               Information about OPIOIDS     PRESCRIPTION OPIOIDS: WHAT YOU NEED TO KNOW   We gave you an opioid (narcotic) pain medicine. It is important to manage your pain, but opioids are not always the best choice. You should first try all the other options your care team gave you. Take this medicine for as short a time (and as few doses) as possible.    Some activities can increase your pain, such as bandage changes or therapy sessions. It may help to take your pain medicine 30 to 60 minutes before these activities. Reduce your stress by getting enough sleep, working on hobbies you enjoy and practicing relaxation or meditation. Talk to your care team about ways to manage your pain beyond prescription opioids.    These medicines have risks:    DO NOT drive when on new or higher doses of pain medicine. These medicines can affect your alertness and reaction times, and you could be arrested for driving under the influence (DUI). If you need to use opioids long-term, talk to your care team about driving.    DO NOT operate heavy machinery    DO NOT do any other dangerous activities while taking these medicines.    DO NOT drink any alcohol while taking these medicines.     If the opioid prescribed includes acetaminophen, DO NOT take with any other medicines that contain acetaminophen. Read all labels carefully. Look for the word  acetaminophen  or  Tylenol.  Ask your pharmacist if you have questions or are unsure.    You can get addicted to pain medicines, especially if you have a history of addiction (chemical, alcohol or substance dependence). Talk to your care team about ways to reduce this risk.    All opioids tend to cause constipation. Drink plenty of water and eat foods that have a lot of fiber, such as fruits, vegetables, prune juice, apple juice and high-fiber cereal. Take a laxative (Miralax, milk of magnesia, Colace, Senna) if you don t move your bowels at least every other day. Other side effects  include upset stomach, sleepiness, dizziness, throwing up, tolerance (needing more of the medicine to have the same effect), physical dependence and slowed breathing.    Store your pills in a secure place, locked if possible. We will not replace any lost or stolen medicine. If you don t finish your medicine, please throw away (dispose) as directed by your pharmacist. The [place] [employer]ution [employer] has more information about safe disposal: https://www.pca.[facility].[place].us/living-green/managing-unwanted-medications         Primary Care Provider Office Phone # Fax #    Andrei Philip [phone]       [address] [place] [address]        Equal Access to Services     BRENNAN TELLO : Hadii tacos burnso Soeri, waaxda luqadaha, qaybta kaalmada toyin, lazaro pepe. So [facility] [phone].    ATENCIÓN: Si habla español, tiene a spangler disposición servicios gratuitos de asistencia lingüística. Llame al [phone].    We comply with applicable federal civil rights laws and [place] laws. We do not discriminate on the basis of race, color, national origin, age, disability, sex, sexual orientation, or gender identity.            Thank you!     Thank you for choosing [facility]  for your care. Our goal is always to provide you with excellent care. Hearing back from our patients is one way we can continue to improve our services. Please take a few minutes to complete the written survey that you may receive in the mail after your visit with us. Thank you!             Your Updated Medication List - Protect others around you: Learn how to safely use, store and throw away your medicines at www.disposemymeds.org.          This list is accurate as of 11/20/18  1:19 PM.  Always use your most recent med list.                   Brand Name Dispense Instructions for use Diagnosis    albuterol 108 (90 Base) MCG/ACT inhaler    PROAIR HFA/PROVENTIL  HFA/VENTOLIN HFA    1 Inhaler    Inhale 2 puffs into the lungs every 4 hours as needed for shortness of breath / dyspnea or wheezing    Bronchospasm       ALPRAZolam 0.5 MG tablet    XANAX    20 tablet    Take 1 tablet (0.5 mg) by mouth 2 times daily as needed for anxiety    Anxiety       cetirizine 10 MG tablet    zyrTEC     Take 10 mg by mouth daily        fluticasone 50 MCG/ACT spray    FLONASE    1 Bottle    Spray 1-2 sprays into both nostrils daily    Bronchospasm       HYDROcodone-acetaminophen 5-325 MG per tablet    NORCO    60 tablet    TAKE 1 TABLET BY MOUTH EVERY 4 TO 6 HOURS AS NEEDED FOR PAIN    Neck pain       IBUPROFEN PO      Take 800 mg by mouth        ondansetron 4 MG ODT tab    ZOFRAN-ODT    20 tablet    DISSOLVE 1 TABLET IN MOUTH EVERY 6 HOURS AS NEEDED FOR NAUSEA    Nausea       TYLENOL PO      Take 1,000 mg by mouth every 6 hours as needed

## 2018-11-20 NOTE — NURSING NOTE
"Chief Complaint   Patient presents with     Physical       Initial /72  Pulse 77  Temp 97.4  F (36.3  C)  Ht 5' 11.75\" (1.822 m)  Wt 174 lb (78.9 kg)  SpO2 98%  BMI 23.76 kg/m2 Estimated body mass index is 23.76 kg/(m^2) as calculated from the following:    Height as of this encounter: 5' 11.75\" (1.822 m).    Weight as of this encounter: 174 lb (78.9 kg).  Medication Reconciliation: complete    Soo Turcios LPN  "

## 2018-11-21 ASSESSMENT — ANXIETY QUESTIONNAIRES: GAD7 TOTAL SCORE: 3

## 2018-11-23 LAB — HIV 1+2 AB+HIV1 P24 AG SERPL QL IA: NONREACTIVE

## 2018-11-29 ENCOUNTER — HOSPITAL ENCOUNTER (EMERGENCY)
Facility: HOSPITAL | Age: 43
Discharge: HOME | End: 2018-11-29
Attending: PHYSICIAN ASSISTANT | Admitting: PHYSICIAN ASSISTANT
Payer: COMMERCIAL

## 2018-11-29 DIAGNOSIS — J02.9 ACUTE VIRAL PHARYNGITIS: ICD-10-CM

## 2018-11-29 LAB
DEPRECATED S PYO AG THROAT QL EIA: NORMAL
SPECIMEN SOURCE: NORMAL

## 2018-11-29 PROCEDURE — 99213 OFFICE O/P EST LOW 20 MIN: CPT | Performed by: PHYSICIAN ASSISTANT

## 2018-11-29 PROCEDURE — 87880 STREP A ASSAY W/OPTIC: CPT | Performed by: FAMILY MEDICINE

## 2018-11-29 PROCEDURE — 87081 CULTURE SCREEN ONLY: CPT | Performed by: FAMILY MEDICINE

## 2018-11-29 PROCEDURE — G0463 HOSPITAL OUTPT CLINIC VISIT: HCPCS

## 2018-11-29 NOTE — ED PROVIDER NOTES
History   No chief complaint on file.    HPI  Jese Joe is a 43 year old male who presents with sore throat x 3 days. Denies difficulty swallowing. Low grade fevers at home, 99.5.     Problem List:    Patient Active Problem List    Diagnosis Date Noted     ACP (advance care planning) 07/31/2017     Priority: Medium     Advance Care Planning 7/31/2017: ACP Review of Chart / Resources Provided:  Reviewed chart for advance care plan.  Jese Joe has no plan or code status on file. Discussed available resources and provided with information.   Added by Indira Santoro                  Past Medical History:    No past medical history on file.    Past Surgical History:    Past Surgical History:   Procedure Laterality Date     NO SURGICAL HISTORY[         Family History:    Family History   Problem Relation Age of Onset     Diabetes Maternal Grandfather      Heart Disease Paternal Grandfather      MI       Social History:  Marital Status:   [2]  Social History   Substance Use Topics     Smoking status: Former Smoker     Packs/day: 1.00     Years: 4.00     Types: Cigarettes     Quit date: 5/23/2009     Smokeless tobacco: Never Used     Alcohol use Yes      Comment: occasionally        Medications:      Acetaminophen (TYLENOL PO)   albuterol (PROAIR HFA/PROVENTIL HFA/VENTOLIN HFA) 108 (90 Base) MCG/ACT inhaler   ALPRAZolam (XANAX) 0.5 MG tablet   cetirizine (ZYRTEC) 10 MG tablet   fluticasone (FLONASE) 50 MCG/ACT spray   HYDROcodone-acetaminophen (NORCO) 5-325 MG per tablet   IBUPROFEN PO   ondansetron (ZOFRAN-ODT) 4 MG disintegrating tablet         Review of Systems   All other systems reviewed and are negative.      Physical Exam          Physical Exam   Constitutional: He is oriented to person, place, and time. He appears well-developed and well-nourished.   HENT:   Head: Normocephalic and atraumatic.   Mouth/Throat: Uvula is midline and mucous membranes are normal. Posterior oropharyngeal erythema  present. No oropharyngeal exudate, posterior oropharyngeal edema or tonsillar abscesses.   Eyes: Conjunctivae are normal. Pupils are equal, round, and reactive to light.   Neck: Normal range of motion. Neck supple.   Pulmonary/Chest: Effort normal.   Lymphadenopathy:     He has cervical adenopathy (Right sided anterior cervical adenopathy, mild. ).   Neurological: He is alert and oriented to person, place, and time.   Nursing note and vitals reviewed.      ED Course     ED Course     Procedures                 Results for orders placed or performed during the hospital encounter of 11/29/18 (from the past 24 hour(s))   Rapid strep screen   Result Value Ref Range    Specimen Description Throat     Rapid Strep A Screen       NEGATIVE: No Group A streptococcal antigen detected by immunoassay, await culture report.       Medications - No data to display    Assessments & Plan (with Medical Decision Making)   Rapid strep negative. Exam consistent with viral pharyngitis. Supportive care was recommended.     I have reviewed the nursing notes.    I have reviewed the findings, diagnosis, plan and need for follow up with the patient.    New Prescriptions    No medications on file       Final diagnoses:   Acute viral pharyngitis       11/29/2018   [facility]     Kathy Corrales PA-C  11/29/18 [phone]

## 2018-11-29 NOTE — DISCHARGE INSTRUCTIONS
Viral Pharyngitis (Sore Throat)    You or your child have pharyngitis (sore throat). This infection is caused by a virus. It can cause throat pain that is worse when swallowing, aching all over, headache, and fever. The infection may be spread by coughing, kissing, or touching others after touching your mouth or nose. Antibiotic medicines do not work against viruses. They are not used for treating this illness.  Home care    If symptoms are severe, you or your child should rest at home. Return to work or school when you or your child feel well enough.     You or your child should drink plenty of fluids to prevent dehydration.    Use throat lozenges or numbing throat sprays to help reduce pain. Gargling with warm salt water will also help reduce throat pain. Dissolve 1/2 teaspoon of salt in 1 glass of warm water. Children can sip on juice or a popsicle. Children 5 years and older can also suck on a lollipop or hard candy.    Don t eat salty or spicy foods or give them to your child. These can be irritating to the throat.  Medicines for a child: You can give your child acetaminophen for fever, fussiness, or discomfort. In babies over 6 months of age, you may use ibuprofen instead of acetaminophen. If your child has chronic liver or kidney disease or ever had a stomach ulcer or GI bleeding, talk with your child s healthcare provider before giving these medicines. Aspirin should never be used by any child under 18 years of age who has a fever. It may cause severe liver damage.  Medicines for an adult: You may use acetaminophen or ibuprofen to control pain or fever, unless another medicine was prescribed for this. If you have chronic liver or kidney disease or ever had a stomach ulcer or GI bleeding, talk with your healthcare provider before using these medicines.  Follow-up care  Follow up with a healthcare provider or our staff if you or your child are not getting better over the next week.  When to seek medical  advice  Call your healthcare provider right away if any of these occur:    Fever as directed by your healthcare provider.  For children, seek care if:  ? Your child is of any age and has repeated fevers above 104 F (40 C).  ? Your child is younger than 2 years of age and has a fever of 100.4 F (38 C) for more than 1 day.  ? Your child is 2 years old or older and has a fever of 100.4 F (38 C) for more than 3 days.    New or worsening ear pain, sinus pain, or headache    Painful lumps in the back of neck    Stiff neck    Lymph nodes are getting larger    Can t swallow liquids, a lot of drooling, or can t open mouth wide due to throat pain    Signs of dehydration, such as very dark urine or no urine, sunken eyes, dizziness    Trouble breathing or noisy breathing    Muffled voice    New rash    Other symptoms are getting worse  Date Last Reviewed: 10/1/2017    9530-7200 [employer]. [address] [place] [address], [place] [address]. All rights reserved. This information is not intended as a substitute for professional medical care. Always follow your healthcare professional's instructions.

## 2018-11-29 NOTE — ED AVS SNAPSHOT
[facility]    [address] [address]    Phone:  [phone]                                       Jese Joe   MRN: [id number]    Department:  [facility]   Date of Visit:  11/29/2018           After Visit Summary Signature Page     I have received my discharge instructions, and my questions have been answered. I have discussed any challenges I see with this plan with the nurse or doctor.    ..........................................................................................................................................  Patient/Patient Representative Signature      ..........................................................................................................................................  Patient Representative Print Name and Relationship to Patient    ..................................................               ................................................  Date                                   Time    ..........................................................................................................................................  Reviewed by Signature/Title    ...................................................              ..............................................  Date                                               Time          22EPIC Rev 08/18

## 2018-11-29 NOTE — ED AVS SNAPSHOT
[facility]    [address] [address]    Phone:  [phone]                                       Jese Joe   MRN: [id number]    Department:  [facility]   Date of Visit:  11/29/2018           Patient Information     Date Of Birth          1975        Your diagnoses for this visit were:     Acute viral pharyngitis        You were seen by Kathy Corrales PA-C.      Follow-up Information     Follow up with [facility].    Specialty:  EMERGENCY MEDICINE    Why:  If symptoms worsen    Contact information:    [address] [facility] [place] [address] [phone]    Additional information:    From [place]: Take [address]. Turn left at [address]. Turn left at the first stoplight on [address]. At the first stop sign, take a right onto [address]. Take a left into the parking lot and continue through until you reach the North enterance of the building.       From [place]: Take [address]. Take the [address] ramp towards [place]. Turn left onto [address]. Take a slight right onto [address]. Turn left at the first stoplight on [address]. At the first stop sign, take a right onto [address]. Take a left into the parking lot and continue through until you reach the North enterance of the building.       From [place]: Take [address]. Take a right at [address]. At the first stop sign, take a right onto [address]. Take a left into the parking lot and continue through until you reach the North enterance of the building.         Discharge Instructions         Viral Pharyngitis (Sore Throat)    You or your child have pharyngitis (sore throat). This infection is caused by a virus. It can cause throat pain that is worse when swallowing, aching all over, headache, and fever. The infection may be spread by coughing, kissing, or touching others after  touching your mouth or nose. Antibiotic medicines do not work against viruses. They are not used for treating this illness.  Home care    If symptoms are severe, you or your child should rest at home. Return to work or school when you or your child feel well enough.     You or your child should drink plenty of fluids to prevent dehydration.    Use throat lozenges or numbing throat sprays to help reduce pain. Gargling with warm salt water will also help reduce throat pain. Dissolve 1/2 teaspoon of salt in 1 glass of warm water. Children can sip on juice or a popsicle. Children 5 years and older can also suck on a lollipop or hard candy.    Don t eat salty or spicy foods or give them to your child. These can be irritating to the throat.  Medicines for a child: You can give your child acetaminophen for fever, fussiness, or discomfort. In babies over 6 months of age, you may use ibuprofen instead of acetaminophen. If your child has chronic liver or kidney disease or ever had a stomach ulcer or GI bleeding, talk with your child s healthcare provider before giving these medicines. Aspirin should never be used by any child under 18 years of age who has a fever. It may cause severe liver damage.  Medicines for an adult: You may use acetaminophen or ibuprofen to control pain or fever, unless another medicine was prescribed for this. If you have chronic liver or kidney disease or ever had a stomach ulcer or GI bleeding, talk with your healthcare provider before using these medicines.  Follow-up care  Follow up with a healthcare provider or our staff if you or your child are not getting better over the next week.  When to seek medical advice  Call your healthcare provider right away if any of these occur:    Fever as directed by your healthcare provider.  For children, seek care if:  ? Your child is of any age and has repeated fevers above 104 F (40 C).  ? Your child is younger than 2 years of age and has a fever of 100.4 F  (38 C) for more than 1 day.  ? Your child is 2 years old or older and has a fever of 100.4 F (38 C) for more than 3 days.    New or worsening ear pain, sinus pain, or headache    Painful lumps in the back of neck    Stiff neck    Lymph nodes are getting larger    Can t swallow liquids, a lot of drooling, or can t open mouth wide due to throat pain    Signs of dehydration, such as very dark urine or no urine, sunken eyes, dizziness    Trouble breathing or noisy breathing    Muffled voice    New rash    Other symptoms are getting worse  Date Last Reviewed: 10/1/2017    4396-4328 [employer]. [address] [place] [address]. All rights reserved. This information is not intended as a substitute for professional medical care. Always follow your healthcare professional's instructions.             Review of your medicines      Our records show that you are taking the medicines listed below. If these are incorrect, please call your family doctor or clinic.        Dose / Directions Last dose taken    albuterol 108 (90 Base) MCG/ACT inhaler   Commonly known as:  PROAIR HFA/PROVENTIL HFA/VENTOLIN HFA   Dose:  2 puff   Quantity:  1 Inhaler        Inhale 2 puffs into the lungs every 4 hours as needed for shortness of breath / dyspnea or wheezing   Refills:  0        ALPRAZolam 0.5 MG tablet   Commonly known as:  XANAX   Dose:  0.5 mg   Quantity:  20 tablet        Take 1 tablet (0.5 mg) by mouth 2 times daily as needed for anxiety   Refills:  0        cetirizine 10 MG tablet   Commonly known as:  zyrTEC   Dose:  10 mg        Take 10 mg by mouth daily   Refills:  0        fluticasone 50 MCG/ACT nasal spray   Commonly known as:  FLONASE   Dose:  1-2 spray   Quantity:  1 Bottle        Spray 1-2 sprays into both nostrils daily   Refills:  11        HYDROcodone-acetaminophen 5-325 MG tablet   Commonly known as:  NORCO   Quantity:  60 tablet        TAKE 1 TABLET BY MOUTH EVERY 4 TO 6 HOURS AS NEEDED FOR PAIN    Refills:  0        IBUPROFEN PO   Dose:  800 mg        Take 800 mg by mouth   Refills:  0        ondansetron 4 MG ODT tab   Commonly known as:  ZOFRAN-ODT   Quantity:  20 tablet        DISSOLVE 1 TABLET IN MOUTH EVERY 6 HOURS AS NEEDED FOR NAUSEA   Refills:  0        TYLENOL PO   Dose:  1000 mg        Take 1,000 mg by mouth every 6 hours as needed   Refills:  0                Procedures and tests performed during your visit     Beta strep group A culture    Rapid strep screen      Orders Needing Specimen Collection     None      Pending Results     Date and Time Order Name Status Description    11/29/2018 0912 Beta strep group A culture In process             Pending Culture Results     Date and Time Order Name Status Description    11/29/2018 0912 Beta strep group A culture In process             Thank you for choosing [facility]       Thank you for choosing [facility] for your care. Our goal is always to provide you with excellent care. Hearing back from our patients is one way we can continue to improve our services. Please take a few minutes to complete the written survey that you may receive in the mail after you visit with us. Thank you!        [employer] Information     [employer] gives you secure access to your electronic health record. If you see a primary care provider, you can also send messages to your care team and make appointments. If you have questions, please call your primary care clinic.  If you do not have a primary care provider, please call [phone] and they will assist you.        Care EveryWhere ID     This is your Care EveryWhere ID. This could be used by other organizations to access your [facility] medical records  [id number]        Equal Access to Services     BRENNAN TELLO : Yulissa Lala, waslime french, qaybta kaalmada lazaro deal. So [facility] [phone].    ATENCIÓN: Si habla español, tiene a spangler disposición servicios gratuitos de  asistencia lingüística. Sergio al [phone].    We comply with applicable federal civil rights laws and [place] laws. We do not discriminate on the basis of race, color, national origin, age, disability, sex, sexual orientation, or gender identity.            After Visit Summary       This is your record. Keep this with you and show to your community pharmacist(s) and doctor(s) at your next visit.

## 2018-12-01 LAB
BACTERIA SPEC CULT: NORMAL
SPECIMEN SOURCE: NORMAL

## 2019-01-24 NOTE — TELEPHONE ENCOUNTER
Called and spoke to pt. Pt stated that she went to [facility] on Monday. Pt stated that she was diagnosed with right ankle sprain. Pt stated she was prescribed tylenol 3 for pain. Pt stated she RICE her ankle.  Pt stated she was told she had to see ortho. Informed pt I will inform provider.    Chelsie  Last visit: 1.24.18  Last refill: 6.5.18

## 2019-02-12 DIAGNOSIS — F41.9 ANXIETY: ICD-10-CM

## 2019-02-13 RX ORDER — ALPRAZOLAM 0.5 MG
TABLET ORAL
Qty: 20 TABLET | Refills: 0 | Status: ON HOLD | OUTPATIENT
Start: 2019-02-13 | End: 2019-03-28

## 2019-03-26 ENCOUNTER — HOSPITAL ENCOUNTER (INPATIENT)
Facility: HOSPITAL | Age: 44
LOS: 2 days | Discharge: HOME | DRG: 389 | End: 2019-03-28
Attending: EMERGENCY MEDICINE | Admitting: HOSPITALIST
Payer: COMMERCIAL

## 2019-03-26 ENCOUNTER — APPOINTMENT (OUTPATIENT)
Dept: CT IMAGING | Facility: HOSPITAL | Age: 44
DRG: 389 | End: 2019-03-26
Attending: EMERGENCY MEDICINE
Payer: COMMERCIAL

## 2019-03-26 DIAGNOSIS — K56.609 SMALL BOWEL OBSTRUCTION (H): ICD-10-CM

## 2019-03-26 DIAGNOSIS — M54.2 NECK PAIN: ICD-10-CM

## 2019-03-26 DIAGNOSIS — R10.13 ABDOMINAL PAIN, EPIGASTRIC: Primary | ICD-10-CM

## 2019-03-26 DIAGNOSIS — K56.609 SBO (SMALL BOWEL OBSTRUCTION) (H): ICD-10-CM

## 2019-03-26 LAB
ALBUMIN SERPL-MCNC: 3.8 G/DL (ref 3.4–5)
ALP SERPL-CCNC: 74 U/L (ref 40–150)
ALT SERPL W P-5'-P-CCNC: 21 U/L (ref 0–70)
AMYLASE SERPL-CCNC: 32 U/L (ref 30–110)
ANION GAP SERPL CALCULATED.3IONS-SCNC: 6 MMOL/L (ref 3–14)
AST SERPL W P-5'-P-CCNC: 12 U/L (ref 0–45)
BASOPHILS # BLD AUTO: 0 10E9/L (ref 0–0.2)
BASOPHILS NFR BLD AUTO: 0.3 %
BILIRUB SERPL-MCNC: 0.3 MG/DL (ref 0.2–1.3)
BUN SERPL-MCNC: 14 MG/DL (ref 7–30)
CALCIUM SERPL-MCNC: 8.8 MG/DL (ref 8.5–10.1)
CHLORIDE SERPL-SCNC: 107 MMOL/L (ref 94–109)
CO2 SERPL-SCNC: 28 MMOL/L (ref 20–32)
CREAT SERPL-MCNC: 0.77 MG/DL (ref 0.66–1.25)
CRP SERPL-MCNC: 12.2 MG/L (ref 0–8)
DIFFERENTIAL METHOD BLD: ABNORMAL
EOSINOPHIL # BLD AUTO: 0.1 10E9/L (ref 0–0.7)
EOSINOPHIL NFR BLD AUTO: 1 %
ERYTHROCYTE [DISTWIDTH] IN BLOOD BY AUTOMATED COUNT: 12.9 % (ref 10–15)
GFR SERPL CREATININE-BSD FRML MDRD: >90 ML/MIN/{1.73_M2}
GLUCOSE SERPL-MCNC: 104 MG/DL (ref 70–99)
HCT VFR BLD AUTO: 47.1 % (ref 40–53)
HGB BLD-MCNC: 15.8 G/DL (ref 13.3–17.7)
IMM GRANULOCYTES # BLD: 0.1 10E9/L (ref 0–0.4)
IMM GRANULOCYTES NFR BLD: 0.7 %
LACTATE BLD-SCNC: 1.1 MMOL/L (ref 0.7–2)
LIPASE SERPL-CCNC: 105 U/L (ref 73–393)
LYMPHOCYTES # BLD AUTO: 2.5 10E9/L (ref 0.8–5.3)
LYMPHOCYTES NFR BLD AUTO: 18.9 %
MCH RBC QN AUTO: 29.5 PG (ref 26.5–33)
MCHC RBC AUTO-ENTMCNC: 33.5 G/DL (ref 31.5–36.5)
MCV RBC AUTO: 88 FL (ref 78–100)
MONOCYTES # BLD AUTO: 1.1 10E9/L (ref 0–1.3)
MONOCYTES NFR BLD AUTO: 8.2 %
NEUTROPHILS # BLD AUTO: 9.4 10E9/L (ref 1.6–8.3)
NEUTROPHILS NFR BLD AUTO: 70.9 %
NRBC # BLD AUTO: 0 10*3/UL
NRBC BLD AUTO-RTO: 0 /100
PLATELET # BLD AUTO: 412 10E9/L (ref 150–450)
POTASSIUM SERPL-SCNC: 4.2 MMOL/L (ref 3.4–5.3)
PROT SERPL-MCNC: 7.3 G/DL (ref 6.8–8.8)
RBC # BLD AUTO: 5.35 10E12/L (ref 4.4–5.9)
SODIUM SERPL-SCNC: 141 MMOL/L (ref 133–144)
WBC # BLD AUTO: 13.2 10E9/L (ref 4–11)

## 2019-03-26 PROCEDURE — C9113 INJ PANTOPRAZOLE SODIUM, VIA: HCPCS | Performed by: EMERGENCY MEDICINE

## 2019-03-26 PROCEDURE — 99285 EMERGENCY DEPT VISIT HI MDM: CPT | Mod: Z6 | Performed by: EMERGENCY MEDICINE

## 2019-03-26 PROCEDURE — 12000000 ZZH R&B MED SURG/OB

## 2019-03-26 PROCEDURE — 83690 ASSAY OF LIPASE: CPT | Performed by: EMERGENCY MEDICINE

## 2019-03-26 PROCEDURE — 86140 C-REACTIVE PROTEIN: CPT | Performed by: EMERGENCY MEDICINE

## 2019-03-26 PROCEDURE — 96361 HYDRATE IV INFUSION ADD-ON: CPT

## 2019-03-26 PROCEDURE — 25000128 H RX IP 250 OP 636: Performed by: EMERGENCY MEDICINE

## 2019-03-26 PROCEDURE — 25800030 ZZH RX IP 258 OP 636: Performed by: EMERGENCY MEDICINE

## 2019-03-26 PROCEDURE — 85025 COMPLETE CBC W/AUTO DIFF WBC: CPT | Performed by: EMERGENCY MEDICINE

## 2019-03-26 PROCEDURE — 99285 EMERGENCY DEPT VISIT HI MDM: CPT | Mod: 25

## 2019-03-26 PROCEDURE — 96375 TX/PRO/DX INJ NEW DRUG ADDON: CPT

## 2019-03-26 PROCEDURE — 83605 ASSAY OF LACTIC ACID: CPT | Performed by: EMERGENCY MEDICINE

## 2019-03-26 PROCEDURE — 80053 COMPREHEN METABOLIC PANEL: CPT | Performed by: EMERGENCY MEDICINE

## 2019-03-26 PROCEDURE — 25500064 ZZH RX 255 OP 636: Performed by: EMERGENCY MEDICINE

## 2019-03-26 PROCEDURE — 74177 CT ABD & PELVIS W/CONTRAST: CPT | Mod: TC

## 2019-03-26 PROCEDURE — 82150 ASSAY OF AMYLASE: CPT | Performed by: EMERGENCY MEDICINE

## 2019-03-26 PROCEDURE — 96374 THER/PROPH/DIAG INJ IV PUSH: CPT

## 2019-03-26 PROCEDURE — 99222 1ST HOSP IP/OBS MODERATE 55: CPT | Performed by: HOSPITALIST

## 2019-03-26 RX ORDER — IOPAMIDOL 612 MG/ML
100 INJECTION, SOLUTION INTRAVASCULAR ONCE
Status: COMPLETED | OUTPATIENT
Start: 2019-03-26 | End: 2019-03-26

## 2019-03-26 RX ORDER — SODIUM CHLORIDE, SODIUM LACTATE, POTASSIUM CHLORIDE, CALCIUM CHLORIDE 600; 310; 30; 20 MG/100ML; MG/100ML; MG/100ML; MG/100ML
1000 INJECTION, SOLUTION INTRAVENOUS CONTINUOUS
Status: DISCONTINUED | OUTPATIENT
Start: 2019-03-26 | End: 2019-03-27

## 2019-03-26 RX ORDER — ONDANSETRON 2 MG/ML
4 INJECTION INTRAMUSCULAR; INTRAVENOUS
Status: COMPLETED | OUTPATIENT
Start: 2019-03-26 | End: 2019-03-26

## 2019-03-26 RX ORDER — HYDROMORPHONE HYDROCHLORIDE 1 MG/ML
0.5 INJECTION, SOLUTION INTRAMUSCULAR; INTRAVENOUS; SUBCUTANEOUS ONCE
Status: COMPLETED | OUTPATIENT
Start: 2019-03-26 | End: 2019-03-27

## 2019-03-26 RX ORDER — HYDROMORPHONE HYDROCHLORIDE 1 MG/ML
0.5 INJECTION, SOLUTION INTRAMUSCULAR; INTRAVENOUS; SUBCUTANEOUS ONCE
Status: COMPLETED | OUTPATIENT
Start: 2019-03-26 | End: 2019-03-26

## 2019-03-26 RX ADMIN — SODIUM CHLORIDE, POTASSIUM CHLORIDE, SODIUM LACTATE AND CALCIUM CHLORIDE 1000 ML: 600; 310; 30; 20 INJECTION, SOLUTION INTRAVENOUS at 21:54

## 2019-03-26 RX ADMIN — PANTOPRAZOLE SODIUM 40 MG: 40 INJECTION, POWDER, FOR SOLUTION INTRAVENOUS at 21:54

## 2019-03-26 RX ADMIN — IOPAMIDOL 100 ML: 612 INJECTION, SOLUTION INTRAVENOUS at 22:13

## 2019-03-26 RX ADMIN — Medication 0.5 MG: at 22:03

## 2019-03-26 RX ADMIN — ONDANSETRON 4 MG: 2 INJECTION INTRAMUSCULAR; INTRAVENOUS at 21:54

## 2019-03-26 ASSESSMENT — ENCOUNTER SYMPTOMS
VOMITING: 1
NAUSEA: 1
SHORTNESS OF BREATH: 0
ABDOMINAL PAIN: 1
FEVER: 0

## 2019-03-27 ENCOUNTER — APPOINTMENT (OUTPATIENT)
Dept: GENERAL RADIOLOGY | Facility: HOSPITAL | Age: 44
DRG: 389 | End: 2019-03-27
Attending: INTERNAL MEDICINE
Payer: COMMERCIAL

## 2019-03-27 ENCOUNTER — TELEPHONE (OUTPATIENT)
Dept: FAMILY MEDICINE | Facility: OTHER | Age: 44
End: 2019-03-27

## 2019-03-27 DIAGNOSIS — R53.83 FATIGUE, UNSPECIFIED TYPE: ICD-10-CM

## 2019-03-27 DIAGNOSIS — R06.83 SNORING: Primary | ICD-10-CM

## 2019-03-27 LAB
ANION GAP SERPL CALCULATED.3IONS-SCNC: 4 MMOL/L (ref 3–14)
BUN SERPL-MCNC: 12 MG/DL (ref 7–30)
CALCIUM SERPL-MCNC: 7.9 MG/DL (ref 8.5–10.1)
CHLORIDE SERPL-SCNC: 109 MMOL/L (ref 94–109)
CO2 SERPL-SCNC: 29 MMOL/L (ref 20–32)
CREAT SERPL-MCNC: 0.8 MG/DL (ref 0.66–1.25)
GFR SERPL CREATININE-BSD FRML MDRD: >90 ML/MIN/{1.73_M2}
GLUCOSE SERPL-MCNC: 102 MG/DL (ref 70–99)
LACTATE BLD-SCNC: 0.6 MMOL/L (ref 0.7–2)
POTASSIUM SERPL-SCNC: 3.8 MMOL/L (ref 3.4–5.3)
SODIUM SERPL-SCNC: 142 MMOL/L (ref 133–144)

## 2019-03-27 PROCEDURE — 74018 RADEX ABDOMEN 1 VIEW: CPT | Mod: TC

## 2019-03-27 PROCEDURE — 25000128 H RX IP 250 OP 636: Performed by: HOSPITALIST

## 2019-03-27 PROCEDURE — 36415 COLL VENOUS BLD VENIPUNCTURE: CPT | Performed by: HOSPITALIST

## 2019-03-27 PROCEDURE — 80048 BASIC METABOLIC PNL TOTAL CA: CPT | Performed by: HOSPITALIST

## 2019-03-27 PROCEDURE — 96361 HYDRATE IV INFUSION ADD-ON: CPT

## 2019-03-27 PROCEDURE — 99232 SBSQ HOSP IP/OBS MODERATE 35: CPT | Performed by: INTERNAL MEDICINE

## 2019-03-27 PROCEDURE — 25000128 H RX IP 250 OP 636: Performed by: INTERNAL MEDICINE

## 2019-03-27 PROCEDURE — 25800030 ZZH RX IP 258 OP 636: Performed by: EMERGENCY MEDICINE

## 2019-03-27 PROCEDURE — 96376 TX/PRO/DX INJ SAME DRUG ADON: CPT

## 2019-03-27 PROCEDURE — 25000132 ZZH RX MED GY IP 250 OP 250 PS 637: Performed by: HOSPITALIST

## 2019-03-27 PROCEDURE — 12000000 ZZH R&B MED SURG/OB

## 2019-03-27 PROCEDURE — 83605 ASSAY OF LACTIC ACID: CPT | Performed by: HOSPITALIST

## 2019-03-27 RX ORDER — ONDANSETRON 2 MG/ML
4 INJECTION INTRAMUSCULAR; INTRAVENOUS EVERY 6 HOURS PRN
Status: DISCONTINUED | OUTPATIENT
Start: 2019-03-27 | End: 2019-03-28 | Stop reason: HOSPADM

## 2019-03-27 RX ORDER — DEXTROSE, SODIUM CHLORIDE, SODIUM LACTATE, POTASSIUM CHLORIDE, AND CALCIUM CHLORIDE 5; .6; .31; .03; .02 G/100ML; G/100ML; G/100ML; G/100ML; G/100ML
INJECTION, SOLUTION INTRAVENOUS CONTINUOUS
Status: DISCONTINUED | OUTPATIENT
Start: 2019-03-27 | End: 2019-03-28 | Stop reason: HOSPADM

## 2019-03-27 RX ORDER — NALOXONE HYDROCHLORIDE 0.4 MG/ML
.1-.4 INJECTION, SOLUTION INTRAMUSCULAR; INTRAVENOUS; SUBCUTANEOUS
Status: DISCONTINUED | OUTPATIENT
Start: 2019-03-27 | End: 2019-03-28 | Stop reason: HOSPADM

## 2019-03-27 RX ORDER — HYDROMORPHONE HYDROCHLORIDE 1 MG/ML
.4-.8 INJECTION, SOLUTION INTRAMUSCULAR; INTRAVENOUS; SUBCUTANEOUS
Status: DISCONTINUED | OUTPATIENT
Start: 2019-03-27 | End: 2019-03-28 | Stop reason: HOSPADM

## 2019-03-27 RX ORDER — LIDOCAINE 40 MG/G
CREAM TOPICAL
Status: DISCONTINUED | OUTPATIENT
Start: 2019-03-27 | End: 2019-03-28 | Stop reason: HOSPADM

## 2019-03-27 RX ORDER — ONDANSETRON 4 MG/1
4 TABLET, ORALLY DISINTEGRATING ORAL EVERY 6 HOURS PRN
Status: DISCONTINUED | OUTPATIENT
Start: 2019-03-27 | End: 2019-03-28 | Stop reason: HOSPADM

## 2019-03-27 RX ORDER — HYDROMORPHONE HYDROCHLORIDE 1 MG/ML
.3-.5 INJECTION, SOLUTION INTRAMUSCULAR; INTRAVENOUS; SUBCUTANEOUS
Status: DISCONTINUED | OUTPATIENT
Start: 2019-03-27 | End: 2019-03-27

## 2019-03-27 RX ORDER — ACETAMINOPHEN 325 MG/1
650 TABLET ORAL EVERY 4 HOURS PRN
Status: DISCONTINUED | OUTPATIENT
Start: 2019-03-27 | End: 2019-03-28 | Stop reason: HOSPADM

## 2019-03-27 RX ADMIN — SODIUM CHLORIDE, POTASSIUM CHLORIDE, SODIUM LACTATE AND CALCIUM CHLORIDE 1000 ML: 600; 310; 30; 20 INJECTION, SOLUTION INTRAVENOUS at 00:01

## 2019-03-27 RX ADMIN — Medication 0.5 MG: at 00:01

## 2019-03-27 RX ADMIN — SODIUM CHLORIDE, SODIUM LACTATE, POTASSIUM CHLORIDE, CALCIUM CHLORIDE AND DEXTROSE MONOHYDRATE: 5; 600; 310; 30; 20 INJECTION, SOLUTION INTRAVENOUS at 13:12

## 2019-03-27 RX ADMIN — Medication 1 MG: at 01:54

## 2019-03-27 RX ADMIN — SODIUM CHLORIDE, SODIUM LACTATE, POTASSIUM CHLORIDE, CALCIUM CHLORIDE AND DEXTROSE MONOHYDRATE: 5; 600; 310; 30; 20 INJECTION, SOLUTION INTRAVENOUS at 01:12

## 2019-03-27 RX ADMIN — Medication 0.5 MG: at 18:37

## 2019-03-27 RX ADMIN — Medication 0.5 MG: at 15:53

## 2019-03-27 RX ADMIN — DIATRIZOATE MEGLUMINE AND DIATRIZOATE SODIUM 50 ML: 660; 100 SOLUTION ORAL; RECTAL at 08:57

## 2019-03-27 RX ADMIN — Medication 1 MG: at 22:17

## 2019-03-27 RX ADMIN — Medication 0.5 MG: at 10:08

## 2019-03-27 RX ADMIN — Medication 0.5 MG: at 13:08

## 2019-03-27 RX ADMIN — Medication 0.5 MG: at 22:18

## 2019-03-27 RX ADMIN — ONDANSETRON 4 MG: 2 INJECTION INTRAMUSCULAR; INTRAVENOUS at 10:04

## 2019-03-27 ASSESSMENT — ACTIVITIES OF DAILY LIVING (ADL)
ADLS_ACUITY_SCORE: 10

## 2019-03-27 ASSESSMENT — MIFFLIN-ST. JEOR: SCORE: 1724

## 2019-03-27 NOTE — ED NOTES
Face to face report given with opportunity to observe patient.    Report given to INDIO Anderson   3/26/2019  11:01 PM

## 2019-03-27 NOTE — ED TRIAGE NOTES
"Pt states abdominal pain started last night and became progressively worse with vomiting starting this afternoon \"like I ate a bunch of glass and it is all stuck\".   "

## 2019-03-27 NOTE — PROGRESS NOTES
[facility]    Hospitalist Progress Note      Assessment & Plan   Jese Joe is a 44 year old male who was admitted on 3/26/2019. He presented with acute onset abdominal pain that began the evening of 3/25 and then progressively worsened though the day on 3/26 after a [employer] meal at [facility]. He became increasingly distended, developing intense 10/10 pain with nausea and vomiting. Initial evaluation in ED showed elevated WBC 13.2, normal lipase, CRP 12.2, normal LFTs.  CT abdomen/pelvis showed findings suggesting small bowel obstruction versus ileus with mid to distal small bowel wall thickening and reactive mesenteric lymphadenopathy. Question inflammatory or infectious entity.    Abdominal pain:  Etiology unclear. CT suggestive of possible partial SBO versus ileus with some small bowel wall thickening and reactive mesenteric lymphadenopathy. He was passing gas this morning. Attempted gastrografin challenge with f/u AXR showing contrast had made its way completely through the large bowel. This was followed by large brown bowel movement. But shortly after, he developed increasing point tenderness and 5/10 pain just left of his umbilicus. No rebound tenderness. His f/u AXR does show persistent dilated loops of small bowel correlating with the region of his pain. He has no history of issues with diarrhea/constipation, bloody stools, or previous abdominal pain, no family history of inflammatory bowel disease. No previous abdominal surgeries. No other complicating chronic medical illness. Did discuss with GI at [facility]. Some discussion that possible inflammatory bowel disease can sometime present with obstruction, but patient really has no other clinical indicators for this. GI was most concerned that he may yet have unresolved partial obstruction despite the gastrografin passing through. Further discussed with Dr. Vlileda. He will see the patient in the morning. Possibly this may just be resolving  acute infectious process such as gastroenteritis. Did advance patients to clear liquids in late evening. He has kept the liquids down, but does feel increased abdominal discomfort and continues to have pinpoint tenderness left of the umbilicus. Continue PRN analgesics, IVF, and antiemetics.         Diet: NPO for Medical/Clinical Reasons Except for: Meds, Ice Chips    DVT Prophylaxis: Pneumatic Compression Devices  Code Status: Full Code    Disposition: Expected discharge in 1-2 days once abdominal pain resolving and tolerating oral intake    Tracie FRANCO Maryanne    Interval History   Feeling markedly better from admission as far as his pain level. Large BM after gastrografin challenge, but then developed increased 5/10 pain left of his umbilicus. Pain slightly worsened now after advancing to clear liquids.     -Data reviewed today: I reviewed all new labs and imaging results over the last 24 hours. I personally reviewed the abdominal x-ray image(s) showing contrast through large bowel and dilated loops of small bowel.    Physical Exam   Temp: 97.4  F (36.3  C) Temp src: Tympanic BP: 120/60 Pulse: 64 Heart Rate: 66 Resp: 18 SpO2: 97 % O2 Device: None (Room air)    Vitals:    03/26/19 2121 03/27/19 0042   Weight: 81.6 kg (180 lb) 80.6 kg (177 lb 11.1 oz)     Vital Signs with Ranges  Temp:  [97.4  F (36.3  C)-98.6  F (37  C)] 97.4  F (36.3  C)  Pulse:  [64] 64  Heart Rate:  [64-94] 66  Resp:  [14-18] 18  BP: (113-135)/(60-95) 120/60  SpO2:  [94 %-98 %] 97 %  No intake/output data recorded.    Peripheral IV 03/26/19 Right Upper forearm (Active)   Site Assessment WDL 3/27/2019 12:51 AM   Line Status Infusing 3/27/2019 12:51 AM   Phlebitis Scale 0-->no symptoms 3/27/2019 12:51 AM   Infiltration Scale 0 3/27/2019 12:51 AM   Number of days: 1     Line/device assessment(s) completed for medical necessity    Constitutional: Alert and oriented x3. No distress    HEENT: Normocephalic/atraumatic, PERRL, EOMI, mouth clear, neck  supple, no LN.     Cardiovascular: RRR. No murmur, no  rubs, or gallops.      Respiratory: Clear to auscultation bilaterally    Abdomen: Soft, tenderness to palpation just left of umbilicus, no rebound tenderness, nondistended, no organomegaly. Bowel sounds present    Extremities: Warm/dry. No edema    Neuro:  Non focal, cranial nerves intact, Moves all extremities.  Medications     dextrose 5% lactated ringers 75 mL/hr at 03/27/19 0112       sodium chloride (PF)  3 mL Intracatheter Q8H       Data   Recent Labs   Lab 03/27/19  0512 03/26/19  2132   WBC  --  13.2*   HGB  --  15.8   MCV  --  88   PLT  --  412    141   POTASSIUM 3.8 4.2   CHLORIDE 109 107   CO2 29 28   BUN 12 14   CR 0.80 0.77   ANIONGAP 4 6   GEOFFREY 7.9* 8.8   * 104*   ALBUMIN  --  3.8   PROTTOTAL  --  7.3   BILITOTAL  --  0.3   ALKPHOS  --  74   ALT  --  21   AST  --  12   LIPASE  --  105       Recent Results (from the past 24 hour(s))   CT Abdomen Pelvis w Contrast    Narrative    Exam:CT ABDOMEN PELVIS W CONTRAST    History: 44 years Male with generalized abdominal pain nausea and  vomiting.     Comparisons:    Technique: Axial CT imaging of the abdomen and pelvis was performed  with intervenous contrast. Coronal and sagittal reconstructions were  obtained      FINDINGS:  Lung bases:The lung bases are clear    Abdomen:  Liver:Unremarkable  Gallbladder and biliary tree:No calcified gallstones are present.  There is no evidence of biliary dilatation.  Pancreas:Unremarkable  Spleen:Unremarkable  Adrenals:Normal    Kidneys and ureters:Unremarkable    Lymph nodes: Moderately enlarged mid mesenteric lymph nodes are  present. These measure up to 7 mm in short axis.    Bowel: There is abnormal distention of loops of small bowel measuring  up to 4.5 cm. There is no abnormal distention of the stomach duodenum  or proximal portion of the small bowel. There is thickening of loops  of small bowel in the lower abdomen. There is uncertain if this is  a  mid or distal small bowel. The colon is unremarkable.    Vessels:Unremarkable    Osseous structures:Unremarkable    Pelvis:There is no evidence of mass or lymphadenopathy. No abnormal  fluid collections are present.  There is a small volume of free fluid in the pelvis.          Impression    IMPRESSION: Findings suggests small bowel obstruction versus ileus.  There is mid to distal small bowel wall thickening and reactive  mesenteric lymphadenopathy. Question inflammatory or infectious  entity.    There is a small volume of free fluid in the pelvis.    ONDINA FOWLER MD

## 2019-03-27 NOTE — ED PROVIDER NOTES
History     Chief Complaint   Patient presents with     Abdominal Pain     HPI  Jese Joe is a 44 year old male who presents to the emergency department with complaints of epigastric abdominal pain, associated nausea and vomiting.  Pain started this morning and has progressively worsened.  He had a small bowel movement this morning.  No fever or chills.  The pains felt slightly better after vomiting but has since worsened.  Denies any prior abdominal surgeries, no fever, no chills, no diarrhea, no chest pain, shortness of breath, dizziness.    Allergies:  Allergies   Allergen Reactions     Cleocin Hives       Problem List:    Patient Active Problem List    Diagnosis Date Noted     SBO (small bowel obstruction) (H) 2019     Priority: Medium     ACP (advance care planning) 2017     Priority: Medium     Advance Care Planning 2017: ACP Review of Chart / Resources Provided:  Reviewed chart for advance care plan.  Jese Joe has no plan or code status on file. Discussed available resources and provided with information.   Added by Indira Santoro                  Past Medical History:    No past medical history on file.    Past Surgical History:    Past Surgical History:   Procedure Laterality Date     NO SURGICAL HISTORY[         Family History:    Family History   Problem Relation Age of Onset     Diabetes Maternal Grandfather      Heart Disease Paternal Grandfather         MI       Social History:  Marital Status:   [2]  Social History     Tobacco Use     Smoking status: Former Smoker     Packs/day: 1.00     Years: 4.00     Pack years: 4.00     Types: Cigarettes     Last attempt to quit: 2009     Years since quittin.8     Smokeless tobacco: Never Used   Substance Use Topics     Alcohol use: Yes     Comment: occasionally     Drug use: No        Medications:      No current outpatient medications on file.      Review of Systems   Constitutional: Negative for fever.    Respiratory: Negative for shortness of breath.    Cardiovascular: Negative for chest pain.   Gastrointestinal: Positive for abdominal pain, nausea and vomiting.   All other systems reviewed and are negative.      Physical Exam   BP: 135/95  Pulse: 64  Heart Rate: 94  Temp: 98.6  F (37  C)  Resp: 18  Height: 182.9 cm (6')  Weight: 81.6 kg (180 lb)  SpO2: 97 %      Physical Exam   Constitutional: He appears well-developed and well-nourished. He appears distressed.   HENT:   Head: Normocephalic and atraumatic.   Eyes: EOM are normal. Pupils are equal, round, and reactive to light.   Cardiovascular: Normal rate, regular rhythm, normal heart sounds and intact distal pulses.   Pulmonary/Chest: Effort normal and breath sounds normal. No stridor. No respiratory distress.   Abdominal: Soft. Bowel sounds are normal. He exhibits no distension. There is tenderness.       Neurological: He is alert.   Skin: He is not diaphoretic.   Nursing note and vitals reviewed.      ED Course        Procedures       Results for orders placed or performed during the hospital encounter of 03/26/19 (from the past 24 hour(s))   CBC with platelets differential   Result Value Ref Range    WBC 13.2 (H) 4.0 - 11.0 10e9/L    RBC Count 5.35 4.4 - 5.9 10e12/L    Hemoglobin 15.8 13.3 - 17.7 g/dL    Hematocrit 47.1 40.0 - 53.0 %    MCV 88 78 - 100 fl    MCH 29.5 26.5 - 33.0 pg    MCHC 33.5 31.5 - 36.5 g/dL    RDW 12.9 10.0 - 15.0 %    Platelet Count 412 150 - 450 10e9/L    Diff Method Automated Method     % Neutrophils 70.9 %    % Lymphocytes 18.9 %    % Monocytes 8.2 %    % Eosinophils 1.0 %    % Basophils 0.3 %    % Immature Granulocytes 0.7 %    Nucleated RBCs 0 0 /100    Absolute Neutrophil 9.4 (H) 1.6 - 8.3 10e9/L    Absolute Lymphocytes 2.5 0.8 - 5.3 10e9/L    Absolute Monocytes 1.1 0.0 - 1.3 10e9/L    Absolute Eosinophils 0.1 0.0 - 0.7 10e9/L    Absolute Basophils 0.0 0.0 - 0.2 10e9/L    Abs Immature Granulocytes 0.1 0 - 0.4 10e9/L    Absolute  Nucleated RBC 0.0    Comprehensive metabolic panel   Result Value Ref Range    Sodium 141 133 - 144 mmol/L    Potassium 4.2 3.4 - 5.3 mmol/L    Chloride 107 94 - 109 mmol/L    Carbon Dioxide 28 20 - 32 mmol/L    Anion Gap 6 3 - 14 mmol/L    Glucose 104 (H) 70 - 99 mg/dL    Urea Nitrogen 14 7 - 30 mg/dL    Creatinine 0.77 0.66 - 1.25 mg/dL    GFR Estimate >90 >60 mL/min/[1.73_m2]    GFR Estimate If Black >90 >60 mL/min/[1.73_m2]    Calcium 8.8 8.5 - 10.1 mg/dL    Bilirubin Total 0.3 0.2 - 1.3 mg/dL    Albumin 3.8 3.4 - 5.0 g/dL    Protein Total 7.3 6.8 - 8.8 g/dL    Alkaline Phosphatase 74 40 - 150 U/L    ALT 21 0 - 70 U/L    AST 12 0 - 45 U/L   Lactic acid whole blood   Result Value Ref Range    Lactic Acid 1.1 0.7 - 2.0 mmol/L   Lipase   Result Value Ref Range    Lipase 105 73 - 393 U/L   Amylase   Result Value Ref Range    Amylase 32 30 - 110 U/L   CRP inflammation   Result Value Ref Range    CRP Inflammation 12.2 (H) 0.0 - 8.0 mg/L   CT Abdomen Pelvis w Contrast    Narrative    Exam:CT ABDOMEN PELVIS W CONTRAST    History: 44 years Male with generalized abdominal pain nausea and  vomiting.     Comparisons:    Technique: Axial CT imaging of the abdomen and pelvis was performed  with intervenous contrast. Coronal and sagittal reconstructions were  obtained      FINDINGS:  Lung bases:The lung bases are clear    Abdomen:  Liver:Unremarkable  Gallbladder and biliary tree:No calcified gallstones are present.  There is no evidence of biliary dilatation.  Pancreas:Unremarkable  Spleen:Unremarkable  Adrenals:Normal    Kidneys and ureters:Unremarkable    Lymph nodes: Moderately enlarged mid mesenteric lymph nodes are  present. These measure up to 7 mm in short axis.    Bowel: There is abnormal distention of loops of small bowel measuring  up to 4.5 cm. There is no abnormal distention of the stomach duodenum  or proximal portion of the small bowel. There is thickening of loops  of small bowel in the lower abdomen. There is  uncertain if this is a  mid or distal small bowel. The colon is unremarkable.    Vessels:Unremarkable    Osseous structures:Unremarkable    Pelvis:There is no evidence of mass or lymphadenopathy. No abnormal  fluid collections are present.  There is a small volume of free fluid in the pelvis.          Impression    IMPRESSION: Findings suggests small bowel obstruction versus ileus.  There is mid to distal small bowel wall thickening and reactive  mesenteric lymphadenopathy. Question inflammatory or infectious  entity.    There is a small volume of free fluid in the pelvis.    ONDINA FOWLER MD   Basic metabolic panel   Result Value Ref Range    Sodium 142 133 - 144 mmol/L    Potassium 3.8 3.4 - 5.3 mmol/L    Chloride 109 94 - 109 mmol/L    Carbon Dioxide 29 20 - 32 mmol/L    Anion Gap 4 3 - 14 mmol/L    Glucose 102 (H) 70 - 99 mg/dL    Urea Nitrogen 12 7 - 30 mg/dL    Creatinine 0.80 0.66 - 1.25 mg/dL    GFR Estimate >90 >60 mL/min/[1.73_m2]    GFR Estimate If Black >90 >60 mL/min/[1.73_m2]    Calcium 7.9 (L) 8.5 - 10.1 mg/dL   Lactic acid whole blood   Result Value Ref Range    Lactic Acid 0.6 (L) 0.7 - 2.0 mmol/L       Medications   naloxone (NARCAN) injection 0.1-0.4 mg (not administered)   melatonin tablet 1 mg (1 mg Oral Given 3/27/19 0154)   lidocaine 1 % 0.1-1 mL (not administered)   lidocaine (LMX4) kit (not administered)   sodium chloride (PF) 0.9% PF flush 3 mL (not administered)   sodium chloride (PF) 0.9% PF flush 3 mL ( Intracatheter Canceled Entry 3/27/19 0858)   acetaminophen (TYLENOL) tablet 650 mg (not administered)   ondansetron (ZOFRAN-ODT) ODT tab 4 mg ( Oral See Alternative 3/27/19 1004)     Or   ondansetron (ZOFRAN) injection 4 mg (4 mg Intravenous Given 3/27/19 1004)   dextrose 5% in lactated ringers infusion ( Intravenous New Bag 3/27/19 0112)   HYDROmorphone (PF) (DILAUDID) injection 0.4-0.8 mg (0.5 mg Intravenous Given 3/27/19 1008)   lactated ringers BOLUS 1,000 mL (0 mLs Intravenous  Stopped 3/26/19 2350)   ondansetron (ZOFRAN) injection 4 mg (4 mg Intravenous Given 3/26/19 2154)   pantoprazole (PROTONIX) 40 mg IV push injection (40 mg Intravenous Given 3/26/19 2154)   HYDROmorphone (PF) (DILAUDID) injection 0.5 mg (0.5 mg Intravenous Given 3/26/19 2203)   iopamidol (ISOVUE-300) IV solution 61% 100 mL (100 mLs Intravenous Given 3/26/19 2213)   sodium chloride (PF) 0.9% PF flush 60 mL (60 mLs Intravenous Given 3/26/19 2213)   HYDROmorphone (PF) (DILAUDID) injection 0.5 mg (0.5 mg Intravenous Given 3/27/19 0001)   diatrizoate meglumine-sodium (GASTROGRAFIN/GASTROVIEW) 66-10 % solution 50 mL (50 mLs Oral Given by Other 3/27/19 4957)       Assessments & Plan (with Medical Decision Making)   Epigastric abdominal pain: Patient presented to the ED with epigastric abdominal pain since this morning.  He has vomited multiple times and feels nauseated.  Had a small bowel movement this morning.  On presentation to the ED patient was started on IV fluids, IV Zofran, IV Protonix and Dilaudid.  Laboratory tests ordered and CT scan of the abdomen ordered.  At the time of signing out to Dr. Pryor at 10 PM, laboratory results and CT scan results were still pending.    I have reviewed the nursing notes.    I have reviewed the findings, diagnosis, plan and need for follow up with the patient.       Medication List      There are no discharge medications for this visit.         Final diagnoses:   Abdominal pain, epigastric   Small bowel obstruction (H)       3/26/2019   [facility]     Valentin Rivera MD  03/27/19 4576

## 2019-03-27 NOTE — PLAN OF CARE
Upon initial assessment, patient was reporting mild tender abdominal discomfort, primarily to the left mid abdomen, patient was able to pass gas, and declined medication for his mild discomfort, patient has been walking around in his room with no difficulty, as the shift progressed, and after patient received oral gastrografin contrast,  patient did state his abdominal pain was up to 5/10, both dilaudid and zofran were administered with satisfactory results, patient was able to get up and have a very large bowel movement. Patient stated he feels much better.

## 2019-03-27 NOTE — PLAN OF CARE
Pt A&Ox3. He reports pain 2-3/10 and declined any pain medication. Has also declined any nausea this shift. Bowel sounds are active X4. Pt reports passing gas and had small BM yesterday. Pt NPO with ice chips. IVF initiated. Afebrile. HR 60s and regular. /82. RR 16 and sating greater than 92% on room air. Lung sounds are clear. Pt up independently in room, call light within reach and pt calls appropriately.     Face to face report given with opportunity to observe patient.    Report given to INDIO Carpenter   3/27/2019  7:37 AM

## 2019-03-27 NOTE — PROGRESS NOTES
Chart reviewed and pt discussed in interdisciplinary rounds. No anticipated discharge needs at this time.      Medical record and Urbano Score reviewed. No apparent needs noted at this time. Care Transitions will remain available if needs arise.

## 2019-03-27 NOTE — PLAN OF CARE
Face to face report given with opportunity to observe patient.    Report given to Monique Soria RN  3/27/2019  3:17 PM

## 2019-03-27 NOTE — H&P
[facility]    History and Physical - Hospitalist Service       Date of Admission:  3/26/2019    Assessment & Plan   Jese Joe is a 44 year old male admitted on 3/26/2019.     1. Abdominal pain, nausea, vomiting, abdominal distention; per ED prelim CT AP showing findings consistent with SBO;  No previous hx of SBO, no previous abdominal surgeries. Lactate normal    -npo   -pain control   -antiemetics   -IVF   -surgery consult in AM   -no indication for NG tube    -recheck electrolytes.     2. Leukocytosis; mild; WBC 13.2; likely reactive and secondary to dehydration from N/V. No s/s of infection   -repeat CBC in AM       Diet: NPO for Medical/Clinical Reasons Except for: Meds    DVT Prophylaxis: Pneumatic Compression Devices  Harvey Catheter: not present  Code Status: Full code    Disposition Plan   Expected discharge: 2 - 3 days, recommended to prior living arrangement once resolution of SBO.  Entered: Dionicio Parks MD 03/26/2019, 11:44 PM     The patient's care was discussed with the Bedside Nurse and Patient.    Dionicio Parks MD  [facility]    ______________________________________________________________________    Chief Complaint   Abdominal pain     History is obtained from the patient    History of Present Illness   Jese Joe is a 44 year old male with no significant past medical history who presented to ED for evaluation of generalized abdominal pain. The patient had episode of nausea and vomiting yesterday. Throughout the day he noticed worsening generalized abdominal distention and pain. He presented to ED where he underwent CT AP. Per ED report CT AP showed partial SBO. His abdominal pain improved with dilaudid. He denies chest pain, sob, headache, dizziness, fevers, chills. No previous hx of SBO, no previous abdominal surgeyr.     Review of Systems    The 10 point Review of Systems is negative other than noted in the HPI or here.     Past Medical History    I have  reviewed this patient's medical history and updated it with pertinent information if needed.   Hx ofo back pain  Hx of pharyngitis    Past Surgical History   I have reviewed this patient's surgical history and updated it with pertinent information if needed.  Past Surgical History:   Procedure Laterality Date     NO SURGICAL HISTORY[         Social History   I have reviewed this patient's social history and updated it with pertinent information if needed.  Social History     Tobacco Use     Smoking status: Former Smoker     Packs/day: 1.00     Years: 4.00     Pack years: 4.00     Types: Cigarettes     Last attempt to quit: 2009     Years since quittin.8     Smokeless tobacco: Never Used   Substance Use Topics     Alcohol use: Yes     Comment: occasionally     Drug use: No       Family History   I have reviewed this patient's family history and updated it with pertinent information if needed.   Family History   Problem Relation Age of Onset     Diabetes Maternal Grandfather      Heart Disease Paternal Grandfather         MI       Prior to Admission Medications   Prior to Admission Medications   Prescriptions Last Dose Informant Patient Reported? Taking?   ALPRAZolam (XANAX) 0.5 MG tablet   No No   Sig: TAKE 1 TABLET BY MOUTH 2 TIMES A DAY AS NEEDED FOR ANXIETY   Acetaminophen (TYLENOL PO)  Self Yes No   Sig: Take 1,000 mg by mouth every 6 hours as needed    HYDROcodone-acetaminophen (NORCO) 5-325 MG per tablet   No No   Sig: TAKE 1 TABLET BY MOUTH EVERY 4 TO 6 HOURS AS NEEDED FOR PAIN   IBUPROFEN PO  Self Yes No   Sig: Take 800 mg by mouth   albuterol (PROAIR HFA/PROVENTIL HFA/VENTOLIN HFA) 108 (90 Base) MCG/ACT inhaler   No No   Sig: Inhale 2 puffs into the lungs every 4 hours as needed for shortness of breath / dyspnea or wheezing   cetirizine (ZYRTEC) 10 MG tablet  Self Yes No   Sig: Take 10 mg by mouth daily   fluticasone (FLONASE) 50 MCG/ACT spray   No No   Sig: Spray 1-2 sprays into both nostrils daily    ondansetron (ZOFRAN-ODT) 4 MG disintegrating tablet  Self No No   Sig: DISSOLVE 1 TABLET IN MOUTH EVERY 6 HOURS AS NEEDED FOR NAUSEA      Facility-Administered Medications: None     Allergies   Allergies   Allergen Reactions     Cleocin Hives       Physical Exam   Vital Signs: Temp: 98.6  F (37  C) Temp src: Tympanic BP: 135/95   Heart Rate: 94 Resp: 18 SpO2: 95 % O2 Device: None (Room air)    Weight: 180 lbs 0 oz      Gen: no acute distress;   HEENT: ncat eomi mmm  Neck: Supple  CV: RRR; normal s1 s2  Lungs: CTAB  Abd: Soft, nt, nd; hypoactive bowel sounds  Neuro: AOX3; CN grossly intact; moves extremities; nonfocal screening exam  Psyche: appropriate affect  MSK: age appropriate muscle mass  Skin: Warm, dry no rash on face        Data   Data reviewed today: I reviewed all medications, new labs and imaging results over the last 24 hours. I personally reviewed today's labs    Recent Labs   Lab 03/26/19  2132   WBC 13.2*   HGB 15.8   MCV 88         POTASSIUM 4.2   CHLORIDE 107   CO2 28   BUN 14   CR 0.77   ANIONGAP 6   GEOFFREY 8.8   *   ALBUMIN 3.8   PROTTOTAL 7.3   BILITOTAL 0.3   ALKPHOS 74   ALT 21   AST 12   LIPASE 105     No results found for this or any previous visit (from the past 24 hour(s)).

## 2019-03-28 ENCOUNTER — APPOINTMENT (OUTPATIENT)
Dept: GENERAL RADIOLOGY | Facility: HOSPITAL | Age: 44
DRG: 389 | End: 2019-03-28
Attending: INTERNAL MEDICINE
Payer: COMMERCIAL

## 2019-03-28 VITALS
BODY MASS INDEX: 24.07 KG/M2 | RESPIRATION RATE: 16 BRPM | DIASTOLIC BLOOD PRESSURE: 70 MMHG | SYSTOLIC BLOOD PRESSURE: 143 MMHG | TEMPERATURE: 97 F | WEIGHT: 177.69 LBS | HEIGHT: 72 IN | HEART RATE: 55 BPM | OXYGEN SATURATION: 98 %

## 2019-03-28 DIAGNOSIS — F41.9 ANXIETY: ICD-10-CM

## 2019-03-28 LAB
BASOPHILS # BLD AUTO: 0 10E9/L (ref 0–0.2)
BASOPHILS NFR BLD AUTO: 0.4 %
DIFFERENTIAL METHOD BLD: ABNORMAL
EOSINOPHIL # BLD AUTO: 0.4 10E9/L (ref 0–0.7)
EOSINOPHIL NFR BLD AUTO: 5.4 %
ERYTHROCYTE [DISTWIDTH] IN BLOOD BY AUTOMATED COUNT: 12.6 % (ref 10–15)
HCT VFR BLD AUTO: 36.9 % (ref 40–53)
HGB BLD-MCNC: 12.3 G/DL (ref 13.3–17.7)
IMM GRANULOCYTES # BLD: 0 10E9/L (ref 0–0.4)
IMM GRANULOCYTES NFR BLD: 0.3 %
LYMPHOCYTES # BLD AUTO: 2.4 10E9/L (ref 0.8–5.3)
LYMPHOCYTES NFR BLD AUTO: 33.8 %
MCH RBC QN AUTO: 29.5 PG (ref 26.5–33)
MCHC RBC AUTO-ENTMCNC: 33.3 G/DL (ref 31.5–36.5)
MCV RBC AUTO: 89 FL (ref 78–100)
MONOCYTES # BLD AUTO: 0.5 10E9/L (ref 0–1.3)
MONOCYTES NFR BLD AUTO: 6.7 %
NEUTROPHILS # BLD AUTO: 3.8 10E9/L (ref 1.6–8.3)
NEUTROPHILS NFR BLD AUTO: 53.4 %
NRBC # BLD AUTO: 0 10*3/UL
NRBC BLD AUTO-RTO: 0 /100
PLATELET # BLD AUTO: 298 10E9/L (ref 150–450)
RBC # BLD AUTO: 4.17 10E12/L (ref 4.4–5.9)
WBC # BLD AUTO: 7.2 10E9/L (ref 4–11)

## 2019-03-28 PROCEDURE — 85025 COMPLETE CBC W/AUTO DIFF WBC: CPT | Performed by: INTERNAL MEDICINE

## 2019-03-28 PROCEDURE — 36415 COLL VENOUS BLD VENIPUNCTURE: CPT | Performed by: INTERNAL MEDICINE

## 2019-03-28 PROCEDURE — 25000132 ZZH RX MED GY IP 250 OP 250 PS 637: Performed by: INTERNAL MEDICINE

## 2019-03-28 PROCEDURE — 25000128 H RX IP 250 OP 636: Performed by: INTERNAL MEDICINE

## 2019-03-28 PROCEDURE — 74018 RADEX ABDOMEN 1 VIEW: CPT | Mod: TC

## 2019-03-28 PROCEDURE — 99222 1ST HOSP IP/OBS MODERATE 55: CPT | Performed by: SURGERY

## 2019-03-28 PROCEDURE — 99239 HOSP IP/OBS DSCHRG MGMT >30: CPT | Performed by: INTERNAL MEDICINE

## 2019-03-28 PROCEDURE — 25000128 H RX IP 250 OP 636: Performed by: HOSPITALIST

## 2019-03-28 RX ORDER — METHYLPREDNISOLONE 32 MG/1
32 TABLET ORAL DAILY
Qty: 4 TABLET | Refills: 0 | Status: SHIPPED | OUTPATIENT
Start: 2019-03-29 | End: 2019-05-28

## 2019-03-28 RX ORDER — ALPRAZOLAM 0.5 MG
TABLET ORAL
Qty: 20 TABLET | Refills: 0 | Status: SHIPPED | OUTPATIENT
Start: 2019-03-28 | End: 2019-05-28

## 2019-03-28 RX ORDER — METHYLPREDNISOLONE SODIUM SUCCINATE 125 MG/2ML
62.5 INJECTION, POWDER, LYOPHILIZED, FOR SOLUTION INTRAMUSCULAR; INTRAVENOUS ONCE
Status: COMPLETED | OUTPATIENT
Start: 2019-03-28 | End: 2019-03-28

## 2019-03-28 RX ORDER — METRONIDAZOLE 250 MG/1
250 TABLET ORAL EVERY 8 HOURS
Qty: 30 TABLET | Refills: 0 | Status: SHIPPED | OUTPATIENT
Start: 2019-03-28 | End: 2019-05-28

## 2019-03-28 RX ORDER — HYDROCODONE BITARTRATE AND ACETAMINOPHEN 5; 325 MG/1; MG/1
TABLET ORAL
Qty: 60 TABLET | Refills: 0 | COMMUNITY
Start: 2019-03-28 | End: 2019-04-30

## 2019-03-28 RX ORDER — METRONIDAZOLE 250 MG/1
250 TABLET ORAL EVERY 8 HOURS SCHEDULED
Status: DISCONTINUED | OUTPATIENT
Start: 2019-03-28 | End: 2019-03-28 | Stop reason: HOSPADM

## 2019-03-28 RX ADMIN — METRONIDAZOLE 250 MG: 250 TABLET, FILM COATED ORAL at 16:09

## 2019-03-28 RX ADMIN — Medication 0.5 MG: at 16:17

## 2019-03-28 RX ADMIN — Medication 0.5 MG: at 17:11

## 2019-03-28 RX ADMIN — METHYLPREDNISOLONE SODIUM SUCCINATE 62.5 MG: 125 INJECTION, POWDER, FOR SOLUTION INTRAMUSCULAR; INTRAVENOUS at 16:12

## 2019-03-28 RX ADMIN — Medication 0.5 MG: at 02:19

## 2019-03-28 RX ADMIN — Medication 0.5 MG: at 12:36

## 2019-03-28 RX ADMIN — SODIUM CHLORIDE, SODIUM LACTATE, POTASSIUM CHLORIDE, CALCIUM CHLORIDE AND DEXTROSE MONOHYDRATE: 5; 600; 310; 30; 20 INJECTION, SOLUTION INTRAVENOUS at 02:19

## 2019-03-28 ASSESSMENT — ACTIVITIES OF DAILY LIVING (ADL)
ADLS_ACUITY_SCORE: 10

## 2019-03-28 NOTE — PLAN OF CARE
Patient discharged at 6:40 PM via ambulation accompanied by son and staff. Prescriptions sent to patients preferred pharmacy. All belongings sent with patient.     Discharge instructions reviewed with [facility]ient. Listed belongings gathered and returned to patient. Clothing    Patient discharged to Home.   Report called to NA    Core Measures and Vaccines  Core Measures applicable during stay: NA.   Pneumonia and Influenza given prior to discharge, if indicated: N/A    Surgical Patient   Surgical Procedures during stay: NA  Did patient receive discharge instruction on wound care and recognition of infection symptoms? N/A    MISC  Follow up appointment made: Yes   Home and hospital aquired medications returned to patient: N/A  Patient reports pain was well managed at discharge: Yes

## 2019-03-28 NOTE — PLAN OF CARE
Reason for hospital stay: abdominal pain/obstruction  Most recent vitals: /73 (BP Location: Left arm)   Pulse 55   Temp 97.6  F (36.4  C) (Tympanic)   Resp 16   Ht 1.829 m (6')   Wt 80.6 kg (177 lb 11.1 oz)   SpO2 98%   BMI 24.10 kg/m    Pain Management: decreased abdominal discomfort today, 1 dose of dilaudid given for 4/10 discomfort adequate relief.   GI: tender left upper quadrant. Positive flatus.   IVF:  D5LR at 75/hour.  Mobility:  independent  Comments:  Tolerating clear liquids, Dr. Villeda into see patient.   Per patient planning on starting steroids for few days to decrease inflammation, starting flagyl and continue clear liquids, possibly looking forward to go home later.      3/28/2019  3:44 PM  Dona Murray    Face to face report given with opportunity to observe patient.    Report given to Monique BORJAS.     Dona Murray   3/28/2019  3:51 PM

## 2019-03-28 NOTE — CONSULTS
Hospital Consult  3/28/2019    Patient: Jese Joe    Admitting Physician: Antoine Villeda MD    Admitting diagnosis: Abdominal pain    This is a 44 year old male with acute acute onset of abdominal pain 3 days ago.  Stated that it was after he ate at [employer].  Presented to the emergency room at that point had a CT.  CT showed dilated loops of small bowel, thickened small bowel in the mid jejunum, a small amount of fluid in the pelvis.  The patient was admitted to the hospital.    The pain is slowly resolving.  Plain films of the abdomen have shown dye all the way to the cecum consistent with no obstruction.  The dilated loops of small bowel are resolving.  The patient has had bowel movements.  He is passing gas.    Patient states that he has point tenderness to the left side of his umbilicus.  Of note this is the region of the thickened small bowel.  He does state that it is getting better.  He is tolerating clear liquids.  No fevers no chills.  He is never had anything like this in the past.  No family history of inflammatory bowel disease.  Does state that many members of his family have 'sensitive stomachs'.      Past Medical History:  No past medical history on file.    Past Surgical History:  Past Surgical History:   Procedure Laterality Date     NO SURGICAL HISTORY[         Family History History:  Family History   Problem Relation Age of Onset     Diabetes Maternal Grandfather      Heart Disease Paternal Grandfather         MI       History of Tobacco Use:  History   Smoking Status     Former Smoker     Packs/day: 1.00     Years: 4.00     Types: Cigarettes     Quit date: 5/23/2009   Smokeless Tobacco     Never Used       Current Medications:  No current outpatient medications on file.       Allergies:  Allergies   Allergen Reactions     Cleocin Hives       ROS:  Pertinent items are noted in HPI.  All other systems are negative.  A comprehensive review of systems was negative.    PHYSICAL  EXAM:     Vital signs: /73 (BP Location: Left arm)   Pulse 55   Temp 97.6  F (36.4  C) (Tympanic)   Resp 16   Ht 1.829 m (6')   Wt 80.6 kg (177 lb 11.1 oz)   SpO2 98%   BMI 24.10 kg/m     Weight: [unfilled]   BMI: Body mass index is 24.1 kg/m .   General: Normal, healthy, cooperative, in no acute distress, alert, thin   Skin: no jaundice   HEENT: PERRLA, EOMI and Sclera clear, anicteric   Neck: supple, without adenopathy, full range of motion   Lungs: clear to auscultation   CV: Regular rate and rhythm without murmer   Abdominal: abdomen is soft without significant tenderness, masses, organomegaly or guarding   Extremities: No cyanosis, clubbing or edema noted bilaterally in Upper and Lower Extremities   Neurological: without deficit    CBC RESULTS:   Recent Labs   Lab Test 03/28/19  0539   WBC 7.2   RBC 4.17*   HGB 12.3*   HCT 36.9*   MCV 89   MCH 29.5   MCHC 33.3   RDW 12.6          Last Comprehensive Metabolic Panel:  Sodium   Date Value Ref Range Status   03/27/2019 142 133 - 144 mmol/L Final     Potassium   Date Value Ref Range Status   03/27/2019 3.8 3.4 - 5.3 mmol/L Final     Chloride   Date Value Ref Range Status   03/27/2019 109 94 - 109 mmol/L Final     Carbon Dioxide   Date Value Ref Range Status   03/27/2019 29 20 - 32 mmol/L Final     Anion Gap   Date Value Ref Range Status   03/27/2019 4 3 - 14 mmol/L Final     Glucose   Date Value Ref Range Status   03/27/2019 102 (H) 70 - 99 mg/dL Final     Urea Nitrogen   Date Value Ref Range Status   03/27/2019 12 7 - 30 mg/dL Final     Creatinine   Date Value Ref Range Status   03/27/2019 0.80 0.66 - 1.25 mg/dL Final     GFR Estimate   Date Value Ref Range Status   03/27/2019 >90 >60 mL/min/[1.73_m2] Final     Comment:     Non  GFR Calc  Starting 12/18/2018, serum creatinine based estimated GFR (eGFR) will be   calculated using the Chronic Kidney Disease Epidemiology Collaboration   (CKD-EPI) equation.       Calcium   Date Value  Ref Range Status   03/27/2019 7.9 (L) 8.5 - 10.1 mg/dL Final     Bilirubin Total   Date Value Ref Range Status   03/26/2019 0.3 0.2 - 1.3 mg/dL Final     Alkaline Phosphatase   Date Value Ref Range Status   03/26/2019 74 40 - 150 U/L Final     ALT   Date Value Ref Range Status   03/26/2019 21 0 - 70 U/L Final     AST   Date Value Ref Range Status   03/26/2019 12 0 - 45 U/L Final     CRP: 12.2    Radiology:  Exam:CT ABDOMEN PELVIS W CONTRAST     History: 44 years Male with generalized abdominal pain nausea and  vomiting.      Comparisons:     Technique: Axial CT imaging of the abdomen and pelvis was performed  with intervenous contrast. Coronal and sagittal reconstructions were  obtained       FINDINGS:  Lung bases:The lung bases are clear     Abdomen:  Liver:Unremarkable  Gallbladder and biliary tree:No calcified gallstones are present.  There is no evidence of biliary dilatation.  Pancreas:Unremarkable  Spleen:Unremarkable  Adrenals:Normal     Kidneys and ureters:Unremarkable     Lymph nodes: Moderately enlarged mid mesenteric lymph nodes are  present. These measure up to 7 mm in short axis.     Bowel: There is abnormal distention of loops of small bowel measuring  up to 4.5 cm. There is no abnormal distention of the stomach duodenum  or proximal portion of the small bowel. There is thickening of loops  of small bowel in the lower abdomen. There is uncertain if this is a  mid or distal small bowel. The colon is unremarkable.     Vessels:Unremarkable     Osseous structures:Unremarkable     Pelvis:There is no evidence of mass or lymphadenopathy. No abnormal  fluid collections are present.  There is a small volume of free fluid in the pelvis.                                                                            IMPRESSION: Findings suggests small bowel obstruction versus ileus.  There is mid to distal small bowel wall thickening and reactive  mesenteric lymphadenopathy. Question inflammatory or  infectious  entity.     There is a small volume of free fluid in the pelvis.     ONDINA FOWLER MD    I have reviewed the patients CT of the abdomen and pelvis and agree with the findings.      ASSESSMENT: This is a 44 year old male with acute onset of abdominal pain.  Today he does not have a surgical abdomen.  His symptoms are slowly resolving.  With an elevated CRP and the findings on the CAT scan, I do feel that there is concern about inflammatory bowel disease such as Crohn's.     PLAN:    Given the concern of Crohn's I think it would be reasonable to give pulse dose steroids of 6200 mg a day for 5 days along with Flagyl 3 times daily for 10 days.  I would also keep patient on clear liquids until pain has resolved and bowel functions have resumed.  The patient will be admitted to the hospital.  So would recommend a colonoscopy in the outpatient setting.  I also feel that the patient could do the above as an outpatient if he desires.    If this becomes a recurrent problem, I would do more further workup to rule in or rule out inflammatory bowel disease.  This would include a PillCam study.    The plan was discussed with the patient is amenable to it.  Plan was also discussed with the hospitalist.  If there is any other questions or something changes please get a hold of us will see the patient immediately.  You very much for the consult.

## 2019-03-28 NOTE — PLAN OF CARE
Face to face report given with opportunity to observe patient.    Report given to Monique ESPINO   3/27/2019  7:33 PM

## 2019-03-28 NOTE — PLAN OF CARE
Rated abdominal pain 4/10, IV dilaudid 0.5 mg given x 1, absence of nonverbal indicators.  Pt stated he feels hungry, but still has some discomfort and tenderness to abdomen.  Recommended him not to push it with diet.  Slept NOC.      Face to face report given with opportunity to observe patient.    Report given to INDIO Carcamo.    Gris Prado   3/28/2019  7:07 AM

## 2019-03-28 NOTE — DISCHARGE SUMMARY
[facility]    Discharge Summary  Hospitalist    Date of Admission:  3/26/2019  Date of Discharge:  3/28/2019  Discharging Provider: German Camargo  Date of Service (when I saw the patient): 03/28/19    Discharge Diagnoses   Possible partial small bowel obstruction, resolved  Possible gastroenteritis, improved  Presumptive Crohn's disease flare  Abdominal pain, resolved    History of Present Illness   Jese Joe is a 44 year old male who was admitted on 3/26/2019. He presented with acute onset abdominal pain that began the evening of 3/25 and then worsened though the next day after a [employer] meal at [facility]. He became more distended, developing intense 10/10 pain with nausea and vomiting. Initial evaluation in ED showed elevated WBC 13.2, normal lipase, CRP 12.2, normal LFTs.  CT abdomen/pelvis showed findings suggesting small bowel obstruction versus ileus with mid to distal small bowel wall thickening and reactive mesenteric lymphadenopathy. Question inflammatory or infectious entity.    Hospital Course   Jese Joe was admitted on 3/26/2019.  The following problems were addressed during his hospitalization:    Probable flare of newly diagnosed Crohn disease   Presented with abdominal pain with CT suggestive of partial small bowel obstruction with as well some central small bowel wall thickening and reactive mesenteric lymphadenopathy.  He does note that for the past week or so he has had a nondescript feeling of being unwell with decreased energy.  No fevers.  Multiple family members have had various upper respiratory or intestinal illnesses although none with any marked degree of diarrhea or emesis.  Symptoms began with a mid Grindle medial abdomen Deni's was subsequently sensation of epigastric discomfort.  Beginning yesterday morning he developed flatus and then a Gastrografin challenge showed contrast through the small bowel and large bowel movement subsequently.  Shortly after  "that though he did develop some localized tenderness central and left lower quadrant.  No peritoneal signs.  He had persistent dilated loops of small bowel.  No history of bloody stool, any other blood per rectum, or mucus in bowel movements.  He does note that multiple family members have \"a weak stomach\" as he was told by his mother during this admission.  Evaluated by Dr. Villeda who agreed that considerations include inflammatory bowel disease.  His small bowel obstruction is not resolving.  He is tolerating clear liquids.  Pain is diminishing and indeed the morning of discharge he generally was feeling nearly at his baseline.  After evaluation and discussion with the patient have elected course of systemic steroids and metronidazole with follow-up evaluation including colonoscopy.  As I discussed with him it is possible he will have no recurrence of his symptoms that until he does further treatment such as monoclonal's not necessarily indicated.    German Camargo    Significant Results and Procedures   CT and plain films of abdomen suggesting small bowel thickening and mesenteric adenopathy.      Code Status   Full Code       Primary Care Physician   Andrei Philip    Vital signs:  Temp: 97  F (36.1  C) Temp src: Tympanic BP: 143/70 Pulse: 55 Heart Rate: 63 Resp: 16 SpO2: 98 % O2 Device: None (Room air)   Height: 182.9 cm (6') Weight: 80.6 kg (177 lb 11.1 oz)  Estimated body mass index is 24.1 kg/m  as calculated from the following:    Height as of this encounter: 1.829 m (6').    Weight as of this encounter: 80.6 kg (177 lb 11.1 oz).        Awake, alert, pleasant interactive man lying in bed on medical wards.    HEENT: Pupils equal, conjugate. No icterus or nystagmus. Oral mucosa moist. No facial asymmetry.   Neck: Supple, jugular veins not elevated. Trachea midline   Chest: No chest wall movement asymmetry. Aeration preserved to bases. Accessory muscles not in use. Expiratory time not increased. No tidal " wheezes. No rhonchi. No discrete crackles.   Cardiac: PMI not displaced. S1, S2 unremarkable. No S3, S4. P2 not accentuated. No murmurs. Carotid upstroke preserved. Carotid amplitude preserved.   Abdomen: Soft.  Mild palpation tenderness at central and left lower abdomen.  No significant percussion tenderness.  Mild distention.  Hypoactive bowel sounds in all quadrants. Liver and spleen not increased in size. No bruits, masses, or pulsations.   Extremities: No lower extremity edema.  Warm distally.  No eccymoses, clubbing.   Neurologic: Mental state above. Motor 5/5 and bilaterally equal. Tone preserved. No fasiculations or tremors.     Discharge Disposition   Discharged to home  Condition at discharge: Stable    Consultations This Hospital Stay   SURGERY GENERAL IP CONSULT    Time Spent on this Encounter   I, German Camargo, personally saw the patient today and spent greater than 30 minutes discharging this patient.    Discharge Orders   No discharge procedures on file.  Discharge Medications   Current Discharge Medication List      START taking these medications    Details   ALPRAZolam (XANAX) 0.5 MG tablet TAKE 1 TABLET BY MOUTH 2 TIMES DAILY AS NEEDED FOR ANXIETY  Qty: 20 tablet, Refills: 0    Associated Diagnoses: Anxiety         CONTINUE these medications which have NOT CHANGED    Details   Acetaminophen (TYLENOL PO) Take 1,000 mg by mouth every 6 hours as needed       albuterol (PROAIR HFA/PROVENTIL HFA/VENTOLIN HFA) 108 (90 Base) MCG/ACT inhaler Inhale 2 puffs into the lungs every 4 hours as needed for shortness of breath / dyspnea or wheezing  Qty: 1 Inhaler, Refills: 0    Associated Diagnoses: Bronchospasm      cetirizine (ZYRTEC) 10 MG tablet Take 10 mg by mouth daily      fluticasone (FLONASE) 50 MCG/ACT spray Spray 1-2 sprays into both nostrils daily  Qty: 1 Bottle, Refills: 11    Associated Diagnoses: Bronchospasm      HYDROcodone-acetaminophen (NORCO) 5-325 MG per tablet TAKE 1 TABLET BY MOUTH  EVERY 4 TO 6 HOURS AS NEEDED FOR PAIN  Qty: 60 tablet, Refills: 0    Associated Diagnoses: Neck pain      IBUPROFEN PO Take 800 mg by mouth      ondansetron (ZOFRAN-ODT) 4 MG disintegrating tablet DISSOLVE 1 TABLET IN MOUTH EVERY 6 HOURS AS NEEDED FOR NAUSEA  Qty: 20 tablet, Refills: 0    Associated Diagnoses: Nausea           Allergies   Allergies   Allergen Reactions     Cleocin Hives     Data   Most Recent 3 CBC's:  Recent Labs   Lab Test 03/28/19  0539 03/26/19 2132 01/16/18  1439   WBC 7.2 13.2* 15.1*   HGB 12.3* 15.8 13.8   MCV 89 88 89    412 409      Most Recent 3 BMP's:  Recent Labs   Lab Test 03/27/19  0512 03/26/19 2132 11/20/18  0907    141 139   POTASSIUM 3.8 4.2 4.0   CHLORIDE 109 107 106   CO2 29 28 25   BUN 12 14 16   CR 0.80 0.77 0.86   ANIONGAP 4 6 8   GEOFFREY 7.9* 8.8 9.0   * 104* 86     Most Recent 2 LFT's:  Recent Labs   Lab Test 03/26/19 2132 11/20/18  0907   AST 12 17   ALT 21 27   ALKPHOS 74 76   BILITOTAL 0.3 0.7     Most Recent INR's and Anticoagulation Dosing History:  Anticoagulation Dose History     There is no flowsheet data to display.        Most Recent 3 Troponin's:No lab results found.  Most Recent Cholesterol Panel:  Recent Labs   Lab Test 11/20/18  0907   CHOL 172   LDL 98   HDL 57   TRIG 84     Most Recent 6 Bacteria Isolates From Any Culture (See EPIC Reports for Culture Details):  Recent Labs   Lab Test 11/29/18  0912 06/24/18  0948 01/11/18 2051 11/03/16  1525   CULT No beta hemolytic Streptococcus Group A isolated No beta hemolytic Streptococcus Group A isolated Positive: Beta Hemolytic Streptococcus Group A isolated*  Critical Value called to and read back by  Almaz Hodgson at 1000 on 1/13/18 by Jane Alonso   No beta hemolytic Streptococcus Group A isolated     Most Recent TSH, T4 and A1c Labs:No lab results found.  Results for orders placed or performed during the hospital encounter of 03/26/19   CT Abdomen Pelvis w Contrast    Narrative    Exam:CT  ABDOMEN PELVIS W CONTRAST    History: 44 years Male with generalized abdominal pain nausea and  vomiting.     Comparisons:    Technique: Axial CT imaging of the abdomen and pelvis was performed  with intervenous contrast. Coronal and sagittal reconstructions were  obtained      FINDINGS:  Lung bases:The lung bases are clear    Abdomen:  Liver:Unremarkable  Gallbladder and biliary tree:No calcified gallstones are present.  There is no evidence of biliary dilatation.  Pancreas:Unremarkable  Spleen:Unremarkable  Adrenals:Normal    Kidneys and ureters:Unremarkable    Lymph nodes: Moderately enlarged mid mesenteric lymph nodes are  present. These measure up to 7 mm in short axis.    Bowel: There is abnormal distention of loops of small bowel measuring  up to 4.5 cm. There is no abnormal distention of the stomach duodenum  or proximal portion of the small bowel. There is thickening of loops  of small bowel in the lower abdomen. There is uncertain if this is a  mid or distal small bowel. The colon is unremarkable.    Vessels:Unremarkable    Osseous structures:Unremarkable    Pelvis:There is no evidence of mass or lymphadenopathy. No abnormal  fluid collections are present.  There is a small volume of free fluid in the pelvis.          Impression    IMPRESSION: Findings suggests small bowel obstruction versus ileus.  There is mid to distal small bowel wall thickening and reactive  mesenteric lymphadenopathy. Question inflammatory or infectious  entity.    There is a small volume of free fluid in the pelvis.    ONDINA FOWLER MD   XR Abdomen 1 View    Narrative    PROCEDURE: XR ABDOMEN 1 VW 3/27/2019 3:08 PM    HISTORY: f/u SBO s/p gastrografin challenge    COMPARISONS: None.    TECHNIQUE: Single view    FINDINGS: Contrast material is seen throughout the colon although way  to the rectum. There are dilated loops of small bowel seen in the left  upper quadrant. Lumbar scoliosis is noted.         Impression    IMPRESSION:  Dilated small bowel loops. Contrast material present in  the colon all the way to the rectum    NIKKI MONSALVE MD   XR Abdomen 1 View    Narrative    PROCEDURE: XR ABDOMEN 1 VW 3/28/2019 8:10 AM    HISTORY: abdominal pain    COMPARISONS: None.    TECHNIQUE: 1 VW    FINDINGS: Contrast material remains in the transverse colon. Contrast  material seen previously in the sigmoid and rectum has cleared there  is a persistently dilated loop of small bowel seen in the left upper  quadrant. The degree of small bowel distention however has improved.         Impression    IMPRESSION: Decreasing small bowel distention in the left upper  quadrant    NIKKI MONSALVE MD

## 2019-03-28 NOTE — PLAN OF CARE
Temp: 98  F (36.7  C) Temp src: Temporal BP: 126/59 Pulse: 55 Heart Rate: 66 Resp: 18 SpO2: 98 % O2 Device: None (Room air)   A&O, abdominal pain 5/10 left, upper quadrant relief with 0.5 dilaudid.  Lungs clear, bowels active. Clear liquid diet, tolerating well.  Ambulating in room and hallway SBA.  D5 LR at 100ml/hr.  Makes his needs known, call lt within reach.   Face to face report given with opportunity to observe patient.    Report given to Gris Mcintosh   3/27/2019  11:19 PM

## 2019-04-01 ENCOUNTER — TELEPHONE (OUTPATIENT)
Dept: GASTROENTEROLOGY | Facility: CLINIC | Age: 44
End: 2019-04-01

## 2019-04-01 ENCOUNTER — OFFICE VISIT (OUTPATIENT)
Dept: FAMILY MEDICINE | Facility: OTHER | Age: 44
End: 2019-04-01
Attending: FAMILY MEDICINE
Payer: COMMERCIAL

## 2019-04-01 VITALS
OXYGEN SATURATION: 98 % | DIASTOLIC BLOOD PRESSURE: 78 MMHG | WEIGHT: 187 LBS | TEMPERATURE: 97.7 F | HEART RATE: 61 BPM | BODY MASS INDEX: 25.36 KG/M2 | SYSTOLIC BLOOD PRESSURE: 118 MMHG

## 2019-04-01 DIAGNOSIS — K50.919 CROHN'S DISEASE WITH COMPLICATION, UNSPECIFIED GASTROINTESTINAL TRACT LOCATION (H): ICD-10-CM

## 2019-04-01 DIAGNOSIS — K56.609 SMALL BOWEL OBSTRUCTION (H): Primary | ICD-10-CM

## 2019-04-01 PROCEDURE — 99214 OFFICE O/P EST MOD 30 MIN: CPT | Performed by: FAMILY MEDICINE

## 2019-04-01 RX ORDER — BUDESONIDE 3 MG/1
6 CAPSULE, COATED PELLETS ORAL EVERY MORNING
Qty: 60 CAPSULE | Refills: 1 | Status: ON HOLD | OUTPATIENT
Start: 2019-04-01 | End: 2024-02-06

## 2019-04-01 ASSESSMENT — PAIN SCALES - GENERAL: PAINLEVEL: MILD PAIN (2)

## 2019-04-01 NOTE — PROGRESS NOTES
SUBJECTIVE:                                                    Jese Joe is a 44 year old male who presents to clinic today for the following health issues:      Hospital Followup:    Facility:  [facility]  Date of visit: 3/26-3/28/19  Reason for visit: bowel obstruction  Current Status: started solids but is feeling very bloated and full.         PROBLEMS TO ADD ON...    Problem list and histories reviewed & adjusted, as indicated.  Additional history: doing ok, but still some sx for sure.  Bloating.  Pain, though less when in the hospital.  He has no appetite.  Still some pain.  Moving small stool.  Bloating after eating.      Patient Active Problem List   Diagnosis     ACP (advance care planning)     SBO (small bowel obstruction) (H)     Past Surgical History:   Procedure Laterality Date     NO SURGICAL HISTORY[         Social History     Tobacco Use     Smoking status: Former Smoker     Packs/day: 1.00     Years: 4.00     Pack years: 4.00     Types: Cigarettes     Last attempt to quit: 2009     Years since quittin.8     Smokeless tobacco: Never Used   Substance Use Topics     Alcohol use: Yes     Comment: occasionally     Family History   Problem Relation Age of Onset     Diabetes Maternal Grandfather      Heart Disease Paternal Grandfather         MI         Current Outpatient Medications   Medication Sig Dispense Refill     Acetaminophen (TYLENOL PO) Take 1,000 mg by mouth every 6 hours as needed        albuterol (PROAIR HFA/PROVENTIL HFA/VENTOLIN HFA) 108 (90 Base) MCG/ACT inhaler Inhale 2 puffs into the lungs every 4 hours as needed for shortness of breath / dyspnea or wheezing 1 Inhaler 0     ALPRAZolam (XANAX) 0.5 MG tablet TAKE 1 TABLET BY MOUTH 2 TIMES DAILY AS NEEDED FOR ANXIETY 20 tablet 0     budesonide (ENTOCORT EC) 3 MG EC capsule Take 2 capsules (6 mg) by mouth every morning 60 capsule 1     cetirizine (ZYRTEC) 10 MG tablet Take 10 mg by mouth daily       fluticasone (FLONASE) 50 MCG/ACT  spray Spray 1-2 sprays into both nostrils daily 1 Bottle 11     HYDROcodone-acetaminophen (NORCO) 5-325 MG tablet TAKE 1 TABLET BY MOUTH EVERY 4 TO 6 HOURS AS NEEDED FOR PAIN 60 tablet 0     methylPREDNISolone (MEDROL) 32 MG tablet Take 1 tablet (32 mg) by mouth daily 4 tablet 0     metroNIDAZOLE (FLAGYL) 250 MG tablet Take 1 tablet (250 mg) by mouth every 8 hours for 10 days 30 tablet 0     ondansetron (ZOFRAN-ODT) 4 MG disintegrating tablet DISSOLVE 1 TABLET IN MOUTH EVERY 6 HOURS AS NEEDED FOR NAUSEA 20 tablet 0     Allergies   Allergen Reactions     Cleocin Hives       ROS:  Constitutional, HEENT, cardiovascular, pulmonary, gi and gu systems are negative, except as otherwise noted.    OBJECTIVE:                                                    /78   Pulse 61   Temp 97.7  F (36.5  C) (Tympanic)   Wt 84.8 kg (187 lb)   SpO2 98%   BMI 25.36 kg/m    Body mass index is 25.36 kg/m .  GENERAL APPEARANCE: Alert, no acute distress  CV: regular rate and rhythm, no murmur, rub or gallop  RESP: lungs clear to auscultation bilaterally  ABDOMEN: normal bowel sounds, soft, nontender, no hepatosplenomegaly or other masses  SKIN: no suspicious lesions or rashes to visualized skin  NEURO: Alert, oriented x 3, speech and mentation normal      Ct reviewed in detail again.      ASSESSMENT/PLAN:                                                    1. Small bowel obstruction (H)  Reviewed.  Consider infectious, inflammatory.  Asking GI to see.  Finishing steroid which I think is ok.  I sent budesonide in case of flare again and he understands how to use it.  Only if needed with ongoing cares per GI.  Referral sent.  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  ER int he meantime.    - GASTROENTEROLOGY ADULT REF CONSULT ONLY  - budesonide (ENTOCORT EC) 3 MG EC capsule; Take 2 capsules (6 mg) by mouth every morning  Dispense: 60 capsule; Refill: 1          Andrei MCCABE  MD [place]  [facility]

## 2019-04-01 NOTE — NURSING NOTE
Chief Complaint   Patient presents with     Hospital F/U       Initial /78   Pulse 61   Temp 97.7  F (36.5  C) (Tympanic)   Wt 84.8 kg (187 lb)   SpO2 98%   BMI 25.36 kg/m   Estimated body mass index is 25.36 kg/m  as calculated from the following:    Height as of 3/27/19: 1.829 m (6').    Weight as of this encounter: 84.8 kg (187 lb).  Medication Reconciliation: complete    Rochelle Doyle MA

## 2019-04-01 NOTE — TELEPHONE ENCOUNTER
[facility] Call Center    Phone Message    May a detailed message be left on voicemail: yes    Reason for Call: Other: [facility] -   Pt is being referred for DX: small bowel obstruction & Crohn's Disease. Writer informed referral in [facility] only says SBO, referring provider will re-enter referral to say Crohn's. Please review pt's referral and call pt to [facility] an appt, Writer checked for next available nothing available until 6/2019. Protocols states pt must be seen in one month and referring provider wants pt to be seen ASAP.     Action Taken: Message routed to:  [facility] ([facility] GI / IBD

## 2019-04-06 ENCOUNTER — TELEPHONE (OUTPATIENT)
Dept: CASE MANAGEMENT | Facility: HOSPITAL | Age: 44
End: 2019-04-06

## 2019-04-06 NOTE — TELEPHONE ENCOUNTER
Jese Joe, was discharged to home on 3/28/19   from [facility]. I spoke today with him regarding his  discharge.   He indicates he has receive(d) sufficient information upon discharge. Medications were reviewed in full on discharge, including: Medications to be started; medications to be stopped; medications to be continued from preadmission and any side effects. Prescriptions were received at discharge and were able to be filled. Medications are being taken as prescribed.   He indicates he already went to a follow up appointment on 4/1.  He did not have any questions regarding discharge instructions or condition.  Per their request, the following employee (s) can be recognized for their outstanding services delivered:  Care was excellent.  Suggestions to improve service: Nothing indicated.   He was informed he  may receive a survey in the mail from the hospital. Asked if he  would kindly complete the survey in order for [facility] to know if services fully met patient needs.

## 2019-04-10 ENCOUNTER — TELEPHONE (OUTPATIENT)
Dept: FAMILY MEDICINE | Facility: OTHER | Age: 44
End: 2019-04-10

## 2019-04-10 NOTE — LETTER
April 13, 2019      Jese Joe  [id number]   [place] [address]        To Whom It May Concern:    Jese Joe is under my care. He may return to work without restrictions on 4/13/19.      Sincerely,        Andrei Philip MD

## 2019-04-10 NOTE — TELEPHONE ENCOUNTER
2:05 PM    Reason for Call: Phone Call    Description: Pt would like letter to be released back to work with no restrictions. He is scheduled to work on 04-13-19 and would like a copy mailed to his [address].    Was an appointment offered for this call? No  If yes : Appointment type              Date    Preferred method for responding to this message: Letter  What is your phone number ? If need to call with questions [phone]    If we cannot reach you directly, may we leave a detailed response at the number you provided? Yes    Can this message wait until your PCP/provider returns, if available today? Not applicable, provider is in today    Donna Bustillo

## 2019-04-18 NOTE — TELEPHONE ENCOUNTER
FUTURE VISIT INFORMATION      FUTURE VISIT INFORMATION:    Date: 5/15/19    Time: 3PM    [place] [facility] [place]FORMATION:    Referring provider:  Dr. Andrei Philip    Referring providers clinic:  [facility]    Reason for visit/diagnosis:  Small bowel obstruction & Crohn's Disease    NOTES STATUS DETAILS   OFFICE NOTE from referring provider  Internal 4/1/19   OFFICE NOTE from other specialist   N/A    DISCHARGE SUMMARY FROM HOSPITAL N/A    DISCHARGE REPORT FROM ED Internal 3/26/19   OPERATIVE REPORT  N/A    PFC REPORT N/A    MEDICATION LIST N/A    LABS     FIT/STOOL TESTING N/A    PERTINENT LABS Internal    PATHOLOGY REPORTS RELATED TO DIAGNOSIS N/A    DIAGNOSTIC PROCEDURES     COLONOSCOPY N/A    ENDOSCOPY (EGD) N/A    ERCP N/A    EUS N/A    FLEX SIGMOIDOSCOPY N/A    IMAGING & REPORT      CT, MRI, US, XR Internal

## 2019-04-23 ENCOUNTER — TELEPHONE (OUTPATIENT)
Dept: FAMILY MEDICINE | Facility: OTHER | Age: 44
End: 2019-04-23

## 2019-04-23 NOTE — TELEPHONE ENCOUNTER
Pt stated he dropped off some [employer] forms and is wondering the status on those.  He needs those filled out as soon as possible and faxed to the [facility].  BERNARDO PIERCE

## 2019-04-23 NOTE — TELEPHONE ENCOUNTER
Left message that Dr Philip was out yesterday, but signed the forms this morning and they will be faxed this morning.

## 2019-04-25 ENCOUNTER — OFFICE VISIT (OUTPATIENT)
Dept: CHIROPRACTIC MEDICINE | Facility: OTHER | Age: 44
End: 2019-04-25
Attending: CHIROPRACTOR
Payer: COMMERCIAL

## 2019-04-25 DIAGNOSIS — M99.02 SEGMENTAL AND SOMATIC DYSFUNCTION OF THORACIC REGION: ICD-10-CM

## 2019-04-25 DIAGNOSIS — M54.50 ACUTE BILATERAL LOW BACK PAIN WITHOUT SCIATICA: ICD-10-CM

## 2019-04-25 DIAGNOSIS — M99.01 SEGMENTAL AND SOMATIC DYSFUNCTION OF CERVICAL REGION: ICD-10-CM

## 2019-04-25 DIAGNOSIS — M99.03 SEGMENTAL AND SOMATIC DYSFUNCTION OF LUMBAR REGION: Primary | ICD-10-CM

## 2019-04-25 PROCEDURE — 98941 CHIROPRACT MANJ 3-4 REGIONS: CPT | Mod: AT | Performed by: CHIROPRACTOR

## 2019-04-30 DIAGNOSIS — M54.2 NECK PAIN: ICD-10-CM

## 2019-04-30 RX ORDER — HYDROCODONE BITARTRATE AND ACETAMINOPHEN 5; 325 MG/1; MG/1
TABLET ORAL
Qty: 10 TABLET | Refills: 0 | Status: SHIPPED | OUTPATIENT
Start: 2019-04-30 | End: 2019-05-28

## 2019-04-30 NOTE — TELEPHONE ENCOUNTER
HYDROcodone-acetaminophen (NORCO) 5-325 MG tablet      Last Written Prescription Date:  3-28-19 BY DR. MARION  Last Fill Quantity: 60,   # refills: 0  Last Office Visit: 3-28-19  Future Office visit:       Routing refill request to provider for review/approval because:  Drug not on the [facility], P or [facility] refill protocol or controlled substance

## 2019-04-30 NOTE — TELEPHONE ENCOUNTER
Called informed written RX is ready to  at  [facility]  Advised needs an appt to follow-up on med  Patient to check schedule and call back   Request  RX to 's JUDI Eduardo

## 2019-05-14 ENCOUNTER — TELEPHONE (OUTPATIENT)
Dept: GASTROENTEROLOGY | Facility: CLINIC | Age: 44
End: 2019-05-14

## 2019-05-14 NOTE — TELEPHONE ENCOUNTER
Called and spoke with patient reminding him of appointment for 5/15/19 at 3PM, also providing directions to location.

## 2019-05-15 ENCOUNTER — PRE VISIT (OUTPATIENT)
Dept: GASTROENTEROLOGY | Facility: CLINIC | Age: 44
End: 2019-05-15

## 2019-05-15 ENCOUNTER — OFFICE VISIT (OUTPATIENT)
Dept: GASTROENTEROLOGY | Facility: CLINIC | Age: 44
End: 2019-05-15
Payer: COMMERCIAL

## 2019-05-15 VITALS
SYSTOLIC BLOOD PRESSURE: 121 MMHG | WEIGHT: 185.5 LBS | DIASTOLIC BLOOD PRESSURE: 78 MMHG | HEIGHT: 72 IN | HEART RATE: 75 BPM | BODY MASS INDEX: 25.12 KG/M2 | OXYGEN SATURATION: 97 %

## 2019-05-15 DIAGNOSIS — K56.609 SMALL BOWEL OBSTRUCTION (H): Primary | ICD-10-CM

## 2019-05-15 DIAGNOSIS — K56.609 SMALL BOWEL OBSTRUCTION (H): ICD-10-CM

## 2019-05-15 LAB
ALBUMIN SERPL-MCNC: 3.9 G/DL (ref 3.4–5)
ALP SERPL-CCNC: 70 U/L (ref 40–150)
ALT SERPL W P-5'-P-CCNC: 25 U/L (ref 0–70)
ANION GAP SERPL CALCULATED.3IONS-SCNC: 6 MMOL/L (ref 3–14)
AST SERPL W P-5'-P-CCNC: 19 U/L (ref 0–45)
BASOPHILS # BLD AUTO: 0 10E9/L (ref 0–0.2)
BASOPHILS NFR BLD AUTO: 0.5 %
BILIRUB SERPL-MCNC: 0.5 MG/DL (ref 0.2–1.3)
BUN SERPL-MCNC: 16 MG/DL (ref 7–30)
CALCIUM SERPL-MCNC: 9.3 MG/DL (ref 8.5–10.1)
CHLORIDE SERPL-SCNC: 104 MMOL/L (ref 94–109)
CO2 SERPL-SCNC: 27 MMOL/L (ref 20–32)
CREAT SERPL-MCNC: 0.88 MG/DL (ref 0.66–1.25)
CRP SERPL-MCNC: <2.9 MG/L (ref 0–8)
DIFFERENTIAL METHOD BLD: NORMAL
EOSINOPHIL # BLD AUTO: 0.3 10E9/L (ref 0–0.7)
EOSINOPHIL NFR BLD AUTO: 3.7 %
ERYTHROCYTE [DISTWIDTH] IN BLOOD BY AUTOMATED COUNT: 12.8 % (ref 10–15)
ERYTHROCYTE [SEDIMENTATION RATE] IN BLOOD BY WESTERGREN METHOD: 5 MM/H (ref 0–15)
GFR SERPL CREATININE-BSD FRML MDRD: >90 ML/MIN/{1.73_M2}
GLUCOSE SERPL-MCNC: 83 MG/DL (ref 70–99)
HCT VFR BLD AUTO: 45.8 % (ref 40–53)
HGB BLD-MCNC: 15 G/DL (ref 13.3–17.7)
IMM GRANULOCYTES # BLD: 0 10E9/L (ref 0–0.4)
IMM GRANULOCYTES NFR BLD: 0.3 %
LYMPHOCYTES # BLD AUTO: 2.4 10E9/L (ref 0.8–5.3)
LYMPHOCYTES NFR BLD AUTO: 27.2 %
MCH RBC QN AUTO: 29.5 PG (ref 26.5–33)
MCHC RBC AUTO-ENTMCNC: 32.8 G/DL (ref 31.5–36.5)
MCV RBC AUTO: 90 FL (ref 78–100)
MONOCYTES # BLD AUTO: 0.7 10E9/L (ref 0–1.3)
MONOCYTES NFR BLD AUTO: 7.9 %
NEUTROPHILS # BLD AUTO: 5.3 10E9/L (ref 1.6–8.3)
NEUTROPHILS NFR BLD AUTO: 60.4 %
NRBC # BLD AUTO: 0 10*3/UL
NRBC BLD AUTO-RTO: 0 /100
PLATELET # BLD AUTO: 269 10E9/L (ref 150–450)
POTASSIUM SERPL-SCNC: 4 MMOL/L (ref 3.4–5.3)
PROT SERPL-MCNC: 7.4 G/DL (ref 6.8–8.8)
RBC # BLD AUTO: 5.08 10E12/L (ref 4.4–5.9)
SODIUM SERPL-SCNC: 137 MMOL/L (ref 133–144)
WBC # BLD AUTO: 8.8 10E9/L (ref 4–11)

## 2019-05-15 ASSESSMENT — ENCOUNTER SYMPTOMS
SNORES LOUDLY: 1
NECK MASS: 0
PARALYSIS: 0
NUMBNESS: 1
CONSTIPATION: 1
BLOATING: 1
TINGLING: 1
SORE THROAT: 0
PANIC: 0
TREMORS: 0
HOARSE VOICE: 0
WEAKNESS: 0
VOMITING: 0
DIZZINESS: 0
LOSS OF CONSCIOUSNESS: 0
HEARTBURN: 1
SPEECH CHANGE: 0
BACK PAIN: 1
JAUNDICE: 0
SINUS CONGESTION: 0
MUSCLE WEAKNESS: 0
SEIZURES: 0
BOWEL INCONTINENCE: 0
SPUTUM PRODUCTION: 0
HEMATURIA: 0
ARTHRALGIAS: 1
TASTE DISTURBANCE: 0
COUGH: 1
DYSURIA: 0
DYSPNEA ON EXERTION: 0
DIARRHEA: 1
ABDOMINAL PAIN: 1
MEMORY LOSS: 0
DECREASED CONCENTRATION: 0
POOR WOUND HEALING: 0
HEMOPTYSIS: 0
TROUBLE SWALLOWING: 0
COUGH DISTURBING SLEEP: 1
WHEEZING: 0
MUSCLE CRAMPS: 0
BLOOD IN STOOL: 0
NAIL CHANGES: 0
HEADACHES: 0
FLANK PAIN: 0
JOINT SWELLING: 0
DISTURBANCES IN COORDINATION: 0
POSTURAL DYSPNEA: 0
SMELL DISTURBANCE: 0
INSOMNIA: 1
SHORTNESS OF BREATH: 0
MYALGIAS: 1
STIFFNESS: 1
DIFFICULTY URINATING: 1
RECTAL PAIN: 0
NAUSEA: 1
NERVOUS/ANXIOUS: 1
SKIN CHANGES: 0
SINUS PAIN: 0
NECK PAIN: 1
DEPRESSION: 0

## 2019-05-15 ASSESSMENT — PAIN SCALES - GENERAL: PAINLEVEL: NO PAIN (0)

## 2019-05-15 ASSESSMENT — MIFFLIN-ST. JEOR: SCORE: 1759.42

## 2019-05-15 NOTE — LETTER
"5/15/2019       RE: Jese Joe  [address] [address] [place] [id number]     Dear Colleague,    Thank you for referring your patient, Jese Joe, to the [facility]STRO[facility] at [facility]. Please see a copy of my visit note below.    CD NEW    PATIENT: Jese Joe    MRN: [id number]    Date of Birth 1975    Tel: [phone] (home)     PCP: Andrei Philip     HPI: Mr. Joe is a 44 year old male here to establish care for possible Crohn's disease.     Always had a \"funny stomach\". On March 26, 2019 developed obstructive symptoms with abdominal pain and vomiting after eating breakfast.  [facility]ed to [facility] ([facility]) and dx with SBO on CT a/p. Admitted for 2 nights; resolved with NPO x 5 days. Also treated with IV steroids (Medrol 32 mg outpatient x 5 days) and flagyl x 10 days.    Since then, has been doing well. Cannot tolerate fast food, broccoli, salads. Occasional diarrhea (elier with salads).     Former smoker, but has \"slip ups\".     HBI  General well-being 1 = slightly below average  Abdominal pain 1 = mild, central abdomen usually after eating  Number of liquid stools per day 0 (diet dependent)  Abdominal mass 0 = none  Current Complications arthralgia (hands, wrists, elbows, shoulders)     Constitutional symptoms:  Fever NO  Weight loss YES a few lb in the s/o hospital stay    Total number of IBD surgeries (except perianal): 0    Current IBD Medications:  None    Past IBD Medications: none    Past Medical History:   Diagnosis Date     History of steroid therapy      Stomach problems         Past Surgical History:   Procedure Laterality Date     GENITOURINARY SURGERY       NO SURGICAL HISTORY[         Social History     Tobacco Use     Smoking status: Current Some Day Smoker     Packs/day: 0.50     Years: 1.00     Pack years: 0.50     Types: Cigarettes     Start date: 5/10/2018     Last attempt to quit: 5/23/2009     Years " since quittin.9     Smokeless tobacco: Never Used   Substance Use Topics     Alcohol use: Yes     Comment: occasionally       Family History   Problem Relation Age of Onset     Other - See Comments Mother      Diabetes Maternal Grandfather      Heart Disease Paternal Grandfather         MI     Diabetes Other      Colon Cancer No family hx of        Allergies   Allergen Reactions     Cleocin Hives        Outpatient Encounter Medications as of 5/15/2019   Medication Sig Dispense Refill     Acetaminophen (TYLENOL PO) Take 1,000 mg by mouth every 6 hours as needed        albuterol (PROAIR HFA/PROVENTIL HFA/VENTOLIN HFA) 108 (90 Base) MCG/ACT inhaler Inhale 2 puffs into the lungs every 4 hours as needed for shortness of breath / dyspnea or wheezing 1 Inhaler 0     ALPRAZolam (XANAX) 0.5 MG tablet TAKE 1 TABLET BY MOUTH 2 TIMES DAILY AS NEEDED FOR ANXIETY 20 tablet 0     budesonide (ENTOCORT EC) 3 MG EC capsule Take 2 capsules (6 mg) by mouth every morning 60 capsule 1     cetirizine (ZYRTEC) 10 MG tablet Take 10 mg by mouth daily       fluticasone (FLONASE) 50 MCG/ACT spray Spray 1-2 sprays into both nostrils daily 1 Bottle 11     HYDROcodone-acetaminophen (NORCO) 5-325 MG tablet TAKE 1 TABLET BY MOUTH EVERY 4 TO 6 HOURS AS NEEDED FOR PAIN 10 tablet 0     ondansetron (ZOFRAN-ODT) 4 MG disintegrating tablet DISSOLVE 1 TABLET IN MOUTH EVERY 6 HOURS AS NEEDED FOR NAUSEA 20 tablet 0     methylPREDNISolone (MEDROL) 32 MG tablet Take 1 tablet (32 mg) by mouth daily (Patient not taking: Reported on 5/15/2019) 4 tablet 0     [] metroNIDAZOLE (FLAGYL) 250 MG tablet Take 1 tablet (250 mg) by mouth every 8 hours for 10 days 30 tablet 0     No facility-administered encounter medications on file as of 5/15/2019.       NSAID  Daily up to hospital stay     Review of Systems  Complete 10 System ROS performed. All are negative except as documented below, in the HPI, or in patient questionnaire from today's visit.    1)  Constitutional: No fevers, chills, night sweats or malaise, weight loss or gain  2) Skin: No rash  3) Pulmonary: No wheeze, SOB, cough, sputum or hemoptysis  4) Cardiovascular: No Chest pain or palpitations  5) Genitourinary: No blood in urine or dysuria  6) Endocrine: No increased sweating, hunger, thirst or thyroid problems  7) Hematologic: No bruising and easy bleeding  8) Musculoskeletal: no new pain in joints or limitation in ROM  9) Neurologic: No dizziness, paresthesias or weakness or falls  10) Psychiatric:  not depressed/anxious, no sleep problems    PHYSICAL EXAM  Vitals: /78   Pulse 75   Ht 1.829 m (6')   Wt 84.1 kg (185 lb 8 oz)   SpO2 97%   BMI 25.16 kg/m       No Pain (0)       Constitutional: no acute distress, well developed, well nourished  Psych: alert, awake & oriented times three (AA&O x 3), normal judgment and insight  Skin: no rash  Ears/Nose/Mouth/Throat: oropharynx pink and moist, no oral ulcers, pharynx non-injected  Neck: Supple, no thyromegaly, no adenopathy or masses Nodes - No cervical  Cardiovascular: normal s1, s2, regular rhythm, no murmurs or rubs  Respiratory: clear to auscultation bilaterally without rales or wheezes  Gastrointestinal: soft, non-distended, mild TTP in central abdomen, napoleon-umbilical, no masses, no hepatosplenomegaly, no hernias  Neuro: alert, cranial nerves grossly intact, no focal neurological deficit  Rectal: deferred     DATA:  Reviewed in detail past documentation, medications and prior workup available in electronic health records or through outside records.    PERTINENT STUDIES:  Most recent CBC:  WBC   Date Value Ref Range Status   05/15/2019 8.8 4.0 - 11.0 10e9/L Final   ]  Hemoglobin   Date Value Ref Range Status   05/15/2019 15.0 13.3 - 17.7 g/dL Final   ]   Platelet Count   Date Value Ref Range Status   05/15/2019 269 150 - 450 10e9/L Final     Most recent coag:  No results found for: INR    Most recent hepatic panel:  AST   Date Value Ref  Range Status   05/15/2019 19 0 - 45 U/L Final     ALT   Date Value Ref Range Status   05/15/2019 25 0 - 70 U/L Final     No results found for: BILICONJ   Bilirubin Total   Date Value Ref Range Status   05/15/2019 0.5 0.2 - 1.3 mg/dL Final     Albumin   Date Value Ref Range Status   05/15/2019 3.9 3.4 - 5.0 g/dL Final     Alkaline Phosphatase   Date Value Ref Range Status   05/15/2019 70 40 - 150 U/L Final       Most recent creatinine:  Creatinine   Date Value Ref Range Status   05/15/2019 0.88 0.66 - 1.25 mg/dL Final     Endoscopy:   none    Imaging:  CT 3/26/2019:      Bowel: There is abnormal distention of loops of small bowel measuring  up to 4.5 cm. There is no abnormal distention of the stomach duodenum  or proximal portion of the small bowel. There is thickening of loops  of small bowel in the lower abdomen. There is uncertain if this is a  mid or distal small bowel. The colon is unremarkable.    Findings suggests small bowel obstruction versus ileus.  There is mid to distal small bowel wall thickening and reactive  mesenteric lymphadenopathy. Question inflammatory or infectious  entity.     IMPRESSION:  Mr. Joe is a 44 year old here with a recent SBO involving the TI, suspicious for Crohn's disease vs. NSAID-induced ulcer.  Will proceed with the following:    DIAGNOSIS:  # SBO, possibly 2/2 Crohn's disease  # Heavy NSAID use   # Recent smoker     PLAN:  --Blood work today  --Referral for MR enterography to visualize your small intestine [phone]  --After the MR enterography, we will perform an EGD/colonoscopy   --Avoid NSAIDs  --Continue to avoid smoking     IBD Health Care Maintenance:    Immunization History   Administered Date(s) Administered     DTAP (<7y) 1973, 1975, 1973, 06/03/1975     Hep B, Peds or Adolescent 10/23/2008     Influenza (IIV3) PF 11/08/2017     Influenza Vaccine, 3 YRS +, IM (QUADRIVALENT W/PRESERVATIVES) 10/04/2018     MMR 06/23/1986, 06/02/2006     Polio,  Unspecified  1973, 1973, 1973, 06/03/1975     TDAP Vaccine (Adacel) 05/17/2017     Td (Adult), Adsorbed 09/30/1986, 06/02/2006      Misc:  -- Avoid tobacco use  -- Avoid NSAIDs as there is potentially a 25% chance of causing an IBD flare    Return in about 2 months (around 7/24/2019).    Urszula Vides MD   of Medicine  Division of Gastroenterology, Hepatology and Nutrition  [facility]    May 15, 2019    I spent a total of 60 minutes, face to face, was spent with this patient, >50% of which was counseling regarding the above delineated issues.

## 2019-05-15 NOTE — NURSING NOTE
Chief Complaint   Patient presents with     New Patient     New IBD consult       Vitals:    05/15/19 1438   BP: 121/78   Pulse: 75   SpO2: 97%   Weight: 84.1 kg (185 lb 8 oz)   Height: 1.829 m (6')       Body mass index is 25.16 kg/m .    Rox Negro CMA

## 2019-05-15 NOTE — PROGRESS NOTES
"CD NEW    PATIENT: Jese Joe    MRN: [id number]    Date of Birth 1975    Tel: [phone] (home)     PCP: Andrei Philip     HPI: Mr. Joe is a 44 year old male here to establish care for possible Crohn's disease.     Always had a \"funny stomach\". On 2019 developed obstructive symptoms with abdominal pain and vomiting after eating breakfast.  Presented to ER ([facility]) and dx with SBO on CT a/p. Admitted for 2 nights; resolved with NPO x 5 days. Also treated with IV steroids (Medrol 32 mg outpatient x 5 days) and flagyl x 10 days.    Since then, has been doing well. Cannot tolerate fast food, broccoli, salads. Occasional diarrhea (elier with salads).     Former smoker, but has \"slip ups\".     HBI  General well-being 1 = slightly below average  Abdominal pain 1 = mild, central abdomen usually after eating  Number of liquid stools per day 0 (diet dependent)  Abdominal mass 0 = none  Current Complications arthralgia (hands, wrists, elbows, shoulders)     Constitutional symptoms:  Fever NO  Weight loss YES a few lb in the s/o hospital stay    Total number of IBD surgeries (except perianal): 0    Current IBD Medications:  None    Past IBD Medications: none      Past Medical History:   Diagnosis Date     History of steroid therapy      Stomach problems         Past Surgical History:   Procedure Laterality Date     GENITOURINARY SURGERY       NO SURGICAL HISTORY[         Social History     Tobacco Use     Smoking status: Current Some Day Smoker     Packs/day: 0.50     Years: 1.00     Pack years: 0.50     Types: Cigarettes     Start date: 5/10/2018     Last attempt to quit: 2009     Years since quittin.9     Smokeless tobacco: Never Used   Substance Use Topics     Alcohol use: Yes     Comment: occasionally       Family History   Problem Relation Age of Onset     Other - See Comments Mother      Diabetes Maternal Grandfather      Heart Disease Paternal Grandfather         MI     Diabetes " Other      Colon Cancer No family hx of        Allergies   Allergen Reactions     Cleocin Hives        Outpatient Encounter Medications as of 5/15/2019   Medication Sig Dispense Refill     Acetaminophen (TYLENOL PO) Take 1,000 mg by mouth every 6 hours as needed        albuterol (PROAIR HFA/PROVENTIL HFA/VENTOLIN HFA) 108 (90 Base) MCG/ACT inhaler Inhale 2 puffs into the lungs every 4 hours as needed for shortness of breath / dyspnea or wheezing 1 Inhaler 0     ALPRAZolam (XANAX) 0.5 MG tablet TAKE 1 TABLET BY MOUTH 2 TIMES DAILY AS NEEDED FOR ANXIETY 20 tablet 0     budesonide (ENTOCORT EC) 3 MG EC capsule Take 2 capsules (6 mg) by mouth every morning 60 capsule 1     cetirizine (ZYRTEC) 10 MG tablet Take 10 mg by mouth daily       fluticasone (FLONASE) 50 MCG/ACT spray Spray 1-2 sprays into both nostrils daily 1 Bottle 11     HYDROcodone-acetaminophen (NORCO) 5-325 MG tablet TAKE 1 TABLET BY MOUTH EVERY 4 TO 6 HOURS AS NEEDED FOR PAIN 10 tablet 0     ondansetron (ZOFRAN-ODT) 4 MG disintegrating tablet DISSOLVE 1 TABLET IN MOUTH EVERY 6 HOURS AS NEEDED FOR NAUSEA 20 tablet 0     methylPREDNISolone (MEDROL) 32 MG tablet Take 1 tablet (32 mg) by mouth daily (Patient not taking: Reported on 5/15/2019) 4 tablet 0     [] metroNIDAZOLE (FLAGYL) 250 MG tablet Take 1 tablet (250 mg) by mouth every 8 hours for 10 days 30 tablet 0     No facility-administered encounter medications on file as of 5/15/2019.       NSAID  Daily up to hospital stay     Review of Systems  Complete 10 System ROS performed. All are negative except as documented below, in the HPI, or in patient questionnaire from today's visit.    1) Constitutional: No fevers, chills, night sweats or malaise, weight loss or gain  2) Skin: No rash  3) Pulmonary: No wheeze, SOB, cough, sputum or hemoptysis  4) Cardiovascular: No Chest pain or palpitations  5) Genitourinary: No blood in urine or dysuria  6) Endocrine: No increased sweating, hunger, thirst or  thyroid problems  7) Hematologic: No bruising and easy bleeding  8) Musculoskeletal: no new pain in joints or limitation in ROM  9) Neurologic: No dizziness, paresthesias or weakness or falls  10) Psychiatric:  not depressed/anxious, no sleep problems    PHYSICAL EXAM  Vitals: /78   Pulse 75   Ht 1.829 m (6')   Wt 84.1 kg (185 lb 8 oz)   SpO2 97%   BMI 25.16 kg/m      No Pain (0)       Constitutional: no acute distress, well developed, well nourished  Psych: alert, awake & oriented times three (AA&O x 3), normal judgment and insight  Skin: no rash  Ears/Nose/Mouth/Throat: oropharynx pink and moist, no oral ulcers, pharynx non-injected  Neck: Supple, no thyromegaly, no adenopathy or masses Nodes - No cervical  Cardiovascular: normal s1, s2, regular rhythm, no murmurs or rubs  Respiratory: clear to auscultation bilaterally without rales or wheezes  Gastrointestinal: soft, non-distended, mild TTP in central abdomen, napoleon-umbilical, no masses, no hepatosplenomegaly, no hernias  Neuro: alert, cranial nerves grossly intact, no focal neurological deficit  Rectal: deferred     DATA:  Reviewed in detail past documentation, medications and prior workup available in electronic health records or through outside records.    PERTINENT STUDIES:  Most recent CBC:  WBC   Date Value Ref Range Status   05/15/2019 8.8 4.0 - 11.0 10e9/L Final   ]  Hemoglobin   Date Value Ref Range Status   05/15/2019 15.0 13.3 - 17.7 g/dL Final   ]   Platelet Count   Date Value Ref Range Status   05/15/2019 269 150 - 450 10e9/L Final     Most recent coag:  No results found for: INR    Most recent hepatic panel:  AST   Date Value Ref Range Status   05/15/2019 19 0 - 45 U/L Final     ALT   Date Value Ref Range Status   05/15/2019 25 0 - 70 U/L Final     No results found for: BILICONJ   Bilirubin Total   Date Value Ref Range Status   05/15/2019 0.5 0.2 - 1.3 mg/dL Final     Albumin   Date Value Ref Range Status   05/15/2019 3.9 3.4 - 5.0 g/dL  Final     Alkaline Phosphatase   Date Value Ref Range Status   05/15/2019 70 40 - 150 U/L Final       Most recent creatinine:  Creatinine   Date Value Ref Range Status   05/15/2019 0.88 0.66 - 1.25 mg/dL Final     Endoscopy:   none    Imaging:  CT 3/26/2019:      Bowel: There is abnormal distention of loops of small bowel measuring  up to 4.5 cm. There is no abnormal distention of the stomach duodenum  or proximal portion of the small bowel. There is thickening of loops  of small bowel in the lower abdomen. There is uncertain if this is a  mid or distal small bowel. The colon is unremarkable.    Findings suggests small bowel obstruction versus ileus.  There is mid to distal small bowel wall thickening and reactive  mesenteric lymphadenopathy. Question inflammatory or infectious  entity.     IMPRESSION:  Mr. Joe is a 44 year old here with a recent SBO involving the TI, suspicious for Crohn's disease vs. NSAID-induced ulcer.  Will proceed with the following:    DIAGNOSIS:  # SBO, possibly 2/2 Crohn's disease  # Heavy NSAID use   # Recent smoker     PLAN:  --Blood work today  --Referral for MR enterography to visualize your small intestine [phone]  --After the MR enterography, we will perform an EGD/colonoscopy   --Avoid NSAIDs  --Continue to avoid smoking     IBD Health Care Maintenance:    Immunization History   Administered Date(s) Administered     DTAP (<7y) 1973, 1973, 1973, 06/03/1975     Hep B, Peds or Adolescent 10/23/2008     Influenza (IIV3) PF 11/08/2017     Influenza Vaccine, 3 YRS +, IM (QUADRIVALENT W/PRESERVATIVES) 10/04/2018     MMR 06/23/1986, 06/02/2006     Polio, Unspecified  1973, 1973, 1973, 06/03/1975     TDAP Vaccine (Adacel) 05/17/2017     Td (Adult), Adsorbed 09/30/1986, 06/02/2006      Misc:  -- Avoid tobacco use  -- Avoid NSAIDs as there is potentially a 25% chance of causing an IBD flare    Return in about 2 months (around 7/24/2019).    Urszula  MD [place]   of Medicine  Division of Gastroenterology, Hepatology and Nutrition  [facility]    May 15, 2019    I spent a total of 60 minutes, face to face, was spent with this patient, >50% of which was counseling regarding the above delineated issues.    Answers for HPI/ROS submitted by the patient on 5/15/2019   General Symptoms: No  Skin Symptoms: Yes  HENT Symptoms: Yes  EYE SYMPTOMS: No  HEART SYMPTOMS: No  LUNG SYMPTOMS: Yes  INTESTINAL SYMPTOMS: Yes  URINARY SYMPTOMS: Yes  REPRODUCTIVE SYMPTOMS: No  SKELETAL SYMPTOMS: Yes  BLOOD SYMPTOMS: No  NERVOUS SYSTEM SYMPTOMS: Yes  MENTAL HEALTH SYMPTOMS: Yes  Changes in hair: No  Changes in moles/birth marks: No  Itching: Yes  Rashes: No  Changes in nails: No  Acne: Yes  Change in facial hair: Yes  Warts: No  Non-healing sores: No  Scarring: No  Flaking of skin: No  Color changes of hands/feet in cold : No  Sun sensitivity: No  Skin thickening: No  Ear pain: No  Ear discharge: No  Hearing loss: Yes  Tinnitus: Yes  Nosebleeds: No  Congestion: No  Sinus pain: No  Trouble swallowing: No   Voice hoarseness: No  Mouth sores: No  Sore throat: No  Tooth pain: No  Gum tenderness: No  Bleeding gums: No  Change in taste: No  Change in sense of smell: No  Dry mouth: No  Hearing aid used: No  Neck lump: No  Cough: Yes  Sputum or phlegm: No  Coughing up blood: No  Difficulty breating or shortness of breath: No  Snoring: Yes  Wheezing: No  Difficulty breathing on exertion: No  Nighttime Cough: Yes  Difficulty breathing when lying flat: No  Heart burn or indigestion: Yes  Nausea: Yes  Vomiting: No  Abdominal pain: Yes  Bloating: Yes  Constipation: Yes  Diarrhea: Yes  Blood in stool: No  Black stools: No  Rectal or Anal pain: No  Fecal incontinence: No  Yellowing of skin or eyes: No  Vomit with blood: No  Change in stools: Yes  Trouble holding urine or incontinence: No  Pain or burning: No  Trouble starting or stopping: No  Increased frequency of  urination: Yes  Blood in urine: No  Decreased frequency of urination: No  Frequent nighttime urination: Yes  Flank pain: No  Difficulty emptying bladder: Yes  Back pain: Yes  Muscle aches: Yes  Neck pain: Yes  Swollen joints: No  Joint pain: Yes  Bone pain: No  Muscle cramps: No  Muscle weakness: No  Joint stiffness: Yes  Bone fracture: No  Trouble with coordination: No  Dizziness or trouble with balance: No  Fainting or black-out spells: No  Memory loss: No  Headache: No  Seizures: No  Speech problems: No  Tingling: Yes  Tremor: No  Weakness: No  Difficulty walking: No  Paralysis: No  Numbness: Yes  Nervous or Anxious: Yes  Depression: No  Trouble sleeping: Yes  Trouble thinking or concentrating: No  Mood changes: No  Panic attacks: No

## 2019-05-15 NOTE — PATIENT INSTRUCTIONS
PLAN   --Obtain outside CT a/p results   --Avoid NSAIDs (eg ibuprofen, aleve, advil, etc)  --Continue to avoid smoking   --Blood work today  --Referral for MR enterography to visualize your small intestine [phone]  --After the MR enterography, we will perform an EGD/colonoscopy -[phone] option 1    For questions regarding your care Monday through Friday, contact the RN GI care coordinator, Brenda Painting at [phone] and your call will be returned within 24 hours. If you have a more immediate medical need call   [phone] . Your call will be returned same day, or if consultation is needed with the provider, it may be following business day. You may also send  a My Chart message to your provider and it will be answered in a timely fashion. If in need of assistance after hours, holidays or weekends please call [phone] and have the on call GI fellow paged.        For medication refills (prescribed by the GI clinic), contact your pharmacy.  In order for your refill to be processed in a timely fashion, it is your responsibility to ensure you follow the recommendations from your provider regarding your laboratory studies and follow up appointments     For appointment rescheduling/cancellation, contact [phone]      After hours, holidays, or if you have an immediate GI concern and cannot wait for a return call, contact the GI Fellow at [phone] and select option #4.

## 2019-05-22 NOTE — PROGRESS NOTES
SUBJECTIVE:   Jese Joe is a 44 year old male who presents to clinic today for the following   health issues:    Musculoskeletal problem/pain      Duration: ongoing    Description  Location: both shoulders but right is the worst.    Intensity:  moderate    Accompanying signs and symptoms: radiation of pain to fingers, numbness in hands and tingling    History  Previous similar problem: YES- ongoing  Previous evaluation:  x-ray and MRI    Precipitating or alleviating factors:  Trauma or overuse: YES- hockey as a kid  Aggravating factors include: overuse     Therapies tried and outcome: heat, ice, stretching, exercises, Ibuprofen and physical therapy.    Patient would like to get back into PT.      PROBLEMS TO ADD ON...    Additional history: doing ok overall.  Neck and bilateral shoulder pains.      Reviewed  and updated as needed this visit by clinical staff         Reviewed and updated as needed this visit by Provider         Patient Active Problem List   Diagnosis     ACP (advance care planning)     SBO (small bowel obstruction) (H)     Past Surgical History:   Procedure Laterality Date     GENITOURINARY SURGERY       NO SURGICAL HISTORY[         Social History     Tobacco Use     Smoking status: Light Tobacco Smoker     Packs/day: 0.50     Years: 1.00     Pack years: 0.50     Types: Cigarettes     Start date: 5/10/2018     Smokeless tobacco: Never Used   Substance Use Topics     Alcohol use: Yes     Comment: occasionally     Family History   Problem Relation Age of Onset     Other - See Comments Mother      Diabetes Maternal Grandfather      Heart Disease Paternal Grandfather         MI     Diabetes Other      Colon Cancer No family hx of          Current Outpatient Medications   Medication Sig Dispense Refill     Acetaminophen (TYLENOL PO) Take 1,000 mg by mouth every 6 hours as needed        albuterol (PROAIR HFA/PROVENTIL HFA/VENTOLIN HFA) 108 (90 Base) MCG/ACT inhaler Inhale 2 puffs into the lungs  every 4 hours as needed for shortness of breath / dyspnea or wheezing 1 Inhaler 0     ALPRAZolam (XANAX) 0.5 MG tablet TAKE 1 TABLET BY MOUTH 2 TIMES DAILY AS NEEDED FOR ANXIETY 20 tablet 0     budesonide (ENTOCORT EC) 3 MG EC capsule Take 2 capsules (6 mg) by mouth every morning 60 capsule 1     cetirizine (ZYRTEC) 10 MG tablet Take 10 mg by mouth daily       fluticasone (FLONASE) 50 MCG/ACT spray Spray 1-2 sprays into both nostrils daily 1 Bottle 11     HYDROcodone-acetaminophen (NORCO) 5-325 MG tablet 1 po bid prn. 40 tablet 0     ondansetron (ZOFRAN-ODT) 4 MG disintegrating tablet DISSOLVE 1 TABLET IN MOUTH EVERY 6 HOURS AS NEEDED FOR NAUSEA 20 tablet 0     Allergies   Allergen Reactions     Cleocin Hives       ROS:  Constitutional, HEENT, cardiovascular, pulmonary, gi and gu systems are negative, except as otherwise noted.    OBJECTIVE:                                                    /78   Pulse 77   Temp 97.8  F (36.6  C) (Tympanic)   Wt 82.1 kg (181 lb)   SpO2 97%   BMI 24.55 kg/m    Body mass index is 24.55 kg/m .  GENERAL APPEARANCE: Alert, no acute distress  MSK;  Pain with abduction of the shoulders bilaterally  is preserved.  Any abduction very painful.    SKIN: no suspicious lesions or rashes to visualized skin  NEURO: Alert, oriented x 3, speech and mentation normal      Xray left shoulder.       ASSESSMENT/PLAN:                                                    1. Neck pain  Ongoing.  PT and follow.   - PHYSICAL THERAPY REFERRAL; Future  - HYDROcodone-acetaminophen (NORCO) 5-325 MG tablet; 1 po bid prn.  Dispense: 40 tablet; Refill: 0  - XR Shoulder Left G/E 3 Views; Future    2. Chronic pain of both shoulders  Bilateral.  Update left xray.  Ok for minimal lortab for severe pain.  Never combine with the xanax, which he barely ever takes.    - PHYSICAL THERAPY REFERRAL; Future  - XR Shoulder Left G/E 3 Views; Future      Reviewed options.  He was told he could try surgery on the  right shoulder.  Neck known OA.  Jese very aware of all the options here.  Warned about the meds and side effects including addiction.  He does not take daily.      Andrei Philip MD  [facility]

## 2019-05-28 ENCOUNTER — ANCILLARY PROCEDURE (OUTPATIENT)
Dept: GENERAL RADIOLOGY | Facility: OTHER | Age: 44
End: 2019-05-28
Attending: FAMILY MEDICINE
Payer: COMMERCIAL

## 2019-05-28 ENCOUNTER — OFFICE VISIT (OUTPATIENT)
Dept: FAMILY MEDICINE | Facility: OTHER | Age: 44
End: 2019-05-28
Attending: FAMILY MEDICINE
Payer: COMMERCIAL

## 2019-05-28 VITALS
HEART RATE: 77 BPM | BODY MASS INDEX: 24.55 KG/M2 | SYSTOLIC BLOOD PRESSURE: 136 MMHG | WEIGHT: 181 LBS | DIASTOLIC BLOOD PRESSURE: 78 MMHG | OXYGEN SATURATION: 97 % | TEMPERATURE: 97.8 F

## 2019-05-28 DIAGNOSIS — G89.29 CHRONIC PAIN OF BOTH SHOULDERS: ICD-10-CM

## 2019-05-28 DIAGNOSIS — M54.2 NECK PAIN: ICD-10-CM

## 2019-05-28 DIAGNOSIS — M25.512 CHRONIC PAIN OF BOTH SHOULDERS: Primary | ICD-10-CM

## 2019-05-28 DIAGNOSIS — M25.511 CHRONIC PAIN OF BOTH SHOULDERS: Primary | ICD-10-CM

## 2019-05-28 DIAGNOSIS — M25.511 CHRONIC PAIN OF BOTH SHOULDERS: ICD-10-CM

## 2019-05-28 DIAGNOSIS — F41.9 ANXIETY: ICD-10-CM

## 2019-05-28 DIAGNOSIS — M25.512 CHRONIC PAIN OF BOTH SHOULDERS: ICD-10-CM

## 2019-05-28 DIAGNOSIS — G89.29 CHRONIC PAIN OF BOTH SHOULDERS: Primary | ICD-10-CM

## 2019-05-28 PROCEDURE — 99213 OFFICE O/P EST LOW 20 MIN: CPT | Performed by: FAMILY MEDICINE

## 2019-05-28 PROCEDURE — 73030 X-RAY EXAM OF SHOULDER: CPT | Mod: TC

## 2019-05-28 RX ORDER — HYDROCODONE BITARTRATE AND ACETAMINOPHEN 5; 325 MG/1; MG/1
TABLET ORAL
Qty: 40 TABLET | Refills: 0 | Status: SHIPPED | OUTPATIENT
Start: 2019-05-28 | End: 2019-06-13

## 2019-05-28 RX ORDER — ALPRAZOLAM 0.5 MG
TABLET ORAL
Qty: 20 TABLET | Refills: 0 | Status: SHIPPED | OUTPATIENT
Start: 2019-05-28 | End: 2019-08-12

## 2019-05-28 ASSESSMENT — PAIN SCALES - GENERAL: PAINLEVEL: MODERATE PAIN (5)

## 2019-05-28 NOTE — NURSING NOTE
No chief complaint on file.      Initial /78   Pulse 77   Temp 97.8  F (36.6  C) (Tympanic)   Wt 82.1 kg (181 lb)   SpO2 97%   BMI 24.55 kg/m   Estimated body mass index is 24.55 kg/m  as calculated from the following:    Height as of 5/15/19: 1.829 m (6').    Weight as of this encounter: 82.1 kg (181 lb).  Medication Reconciliation: complete    Rochelle Doyle MA

## 2019-05-29 ENCOUNTER — TELEPHONE (OUTPATIENT)
Dept: FAMILY MEDICINE | Facility: OTHER | Age: 44
End: 2019-05-29

## 2019-05-29 NOTE — TELEPHONE ENCOUNTER
05/29/2019 - received PA request from [facility] for hydrocodone (norco).  Submitted thru [facility] and via fax to [facility].  Waiting for response.  Gaby Gao, HIS Specialist.

## 2019-06-04 ENCOUNTER — HOSPITAL ENCOUNTER (OUTPATIENT)
Dept: MRI IMAGING | Facility: CLINIC | Age: 44
Discharge: HOME | End: 2019-06-04
Attending: INTERNAL MEDICINE | Admitting: INTERNAL MEDICINE
Payer: COMMERCIAL

## 2019-06-04 DIAGNOSIS — K56.609 SMALL BOWEL OBSTRUCTION (H): ICD-10-CM

## 2019-06-04 PROCEDURE — A9585 GADOBUTROL INJECTION: HCPCS | Performed by: INTERNAL MEDICINE

## 2019-06-04 PROCEDURE — 25000128 H RX IP 250 OP 636: Performed by: INTERNAL MEDICINE

## 2019-06-04 PROCEDURE — 25500064 ZZH RX 255 OP 636: Performed by: INTERNAL MEDICINE

## 2019-06-04 PROCEDURE — 72197 MRI PELVIS W/O & W/DYE: CPT

## 2019-06-04 RX ORDER — GADOBUTROL 604.72 MG/ML
10 INJECTION INTRAVENOUS ONCE
Status: COMPLETED | OUTPATIENT
Start: 2019-06-04 | End: 2019-06-04

## 2019-06-04 RX ADMIN — GLUCAGON HYDROCHLORIDE 1 MG: 1 INJECTION, POWDER, FOR SOLUTION INTRAMUSCULAR; INTRAVENOUS; SUBCUTANEOUS at 12:41

## 2019-06-04 RX ADMIN — GADOBUTROL 8 ML: 604.72 INJECTION INTRAVENOUS at 13:10

## 2019-06-11 ENCOUNTER — TELEPHONE (OUTPATIENT)
Dept: GASTROENTEROLOGY | Facility: CLINIC | Age: 44
End: 2019-06-11

## 2019-06-11 DIAGNOSIS — Z12.11 SPECIAL SCREENING FOR MALIGNANT NEOPLASMS, COLON: Primary | ICD-10-CM

## 2019-06-11 RX ORDER — BISACODYL 5 MG/1
5 TABLET, DELAYED RELEASE ORAL DAILY PRN
Qty: 4 TABLET | Refills: 0 | Status: SHIPPED | OUTPATIENT
Start: 2019-06-11 | End: 2019-08-15

## 2019-06-11 NOTE — TELEPHONE ENCOUNTER
Patient scheduled for Colonoscopy    Indication for procedure. Small Bowel Obstruction     Referring Provider. Andrei Philip    ? no    Arrival time verified? June 18th @7:45 am    Facility location verified? [address]; 5th floor    Instructions given regarding prep and procedure    Prep Type Golytely and dulcolax E script sent to   [facility], [place]    Are you taking any anticoagulants or blood thinners? no    Instructions given? Yes, written in mail    Electronic implanted devices? no    Pre procedure teaching completed? Yes    Transportation from procedure? Yes, verbalized understanding of expectations of designated  and  6 hours after exam    H&P / Pre op physical completed? n/a

## 2019-06-12 DIAGNOSIS — M54.2 NECK PAIN: ICD-10-CM

## 2019-06-12 NOTE — TELEPHONE ENCOUNTER
Patient went to [facility] to get his lortab and because he was filling a new script he was only able to get it filled for 2 weeks.   He was told then he would need to come in with a new script after 2 weeks. Patient only has 7 days of medication left. [facility] pharmacy but was in a hurry and decided to use [facility]   He would like a new script sent to [facility] pharmacy.

## 2019-06-13 RX ORDER — HYDROCODONE BITARTRATE AND ACETAMINOPHEN 5; 325 MG/1; MG/1
TABLET ORAL
Qty: 40 TABLET | Refills: 0 | Status: SHIPPED | OUTPATIENT
Start: 2019-06-13 | End: 2019-08-15

## 2019-06-13 NOTE — TELEPHONE ENCOUNTER
I will explain new [place] law to him and lortab use.  I am confident he doesn't abuse or misuse the medicine.  Thanks.  Andrei Philip

## 2019-06-13 NOTE — TELEPHONE ENCOUNTER
Called informed written RX is ready to  at  [facility]  Patient request  to Celine at [facility]   Kia Eduardo

## 2019-06-18 ENCOUNTER — HOSPITAL ENCOUNTER (OUTPATIENT)
Facility: AMBULATORY SURGERY CENTER | Age: 44
End: 2019-06-18
Attending: INTERNAL MEDICINE
Payer: COMMERCIAL

## 2019-06-18 VITALS
HEART RATE: 76 BPM | TEMPERATURE: 97.4 F | SYSTOLIC BLOOD PRESSURE: 117 MMHG | RESPIRATION RATE: 16 BRPM | BODY MASS INDEX: 25.06 KG/M2 | HEIGHT: 72 IN | WEIGHT: 185 LBS | DIASTOLIC BLOOD PRESSURE: 76 MMHG | OXYGEN SATURATION: 99 %

## 2019-06-18 LAB
COLONOSCOPY: NORMAL
UPPER GI ENDOSCOPY: NORMAL

## 2019-06-18 RX ORDER — LIDOCAINE 40 MG/G
CREAM TOPICAL
Status: DISCONTINUED | OUTPATIENT
Start: 2019-06-18 | End: 2019-06-19 | Stop reason: HOSPADM

## 2019-06-18 RX ORDER — FENTANYL CITRATE 50 UG/ML
INJECTION, SOLUTION INTRAMUSCULAR; INTRAVENOUS PRN
Status: DISCONTINUED | OUTPATIENT
Start: 2019-06-18 | End: 2019-06-18 | Stop reason: HOSPADM

## 2019-06-18 RX ORDER — ONDANSETRON 2 MG/ML
4 INJECTION INTRAMUSCULAR; INTRAVENOUS EVERY 6 HOURS PRN
Status: CANCELLED | OUTPATIENT
Start: 2019-06-18

## 2019-06-18 RX ORDER — SODIUM CHLORIDE 9 MG/ML
INJECTION, SOLUTION INTRAVENOUS CONTINUOUS
Status: DISCONTINUED | OUTPATIENT
Start: 2019-06-18 | End: 2019-06-19 | Stop reason: HOSPADM

## 2019-06-18 RX ORDER — ONDANSETRON 2 MG/ML
4 INJECTION INTRAMUSCULAR; INTRAVENOUS
Status: DISCONTINUED | OUTPATIENT
Start: 2019-06-18 | End: 2019-06-19 | Stop reason: HOSPADM

## 2019-06-18 RX ORDER — ONDANSETRON 4 MG/1
4 TABLET, ORALLY DISINTEGRATING ORAL EVERY 6 HOURS PRN
Status: CANCELLED | OUTPATIENT
Start: 2019-06-18

## 2019-06-18 RX ORDER — FLUMAZENIL 0.1 MG/ML
0.2 INJECTION, SOLUTION INTRAVENOUS
Status: CANCELLED | OUTPATIENT
Start: 2019-06-18 | End: 2019-06-19

## 2019-06-18 RX ORDER — NALOXONE HYDROCHLORIDE 0.4 MG/ML
.1-.4 INJECTION, SOLUTION INTRAMUSCULAR; INTRAVENOUS; SUBCUTANEOUS
Status: CANCELLED | OUTPATIENT
Start: 2019-06-18 | End: 2019-06-19

## 2019-06-18 ASSESSMENT — MIFFLIN-ST. JEOR: SCORE: 1757.15

## 2019-06-24 LAB — COPATH REPORT: NORMAL

## 2019-07-01 NOTE — TELEPHONE ENCOUNTER
Received an APPROVAL from [facility] for Hydrocodone-Acetaminophen. Effective 07/01/2019 to 01/01/2020. Forms scanned to Epic.

## 2019-08-09 ENCOUNTER — TELEPHONE (OUTPATIENT)
Dept: FAMILY MEDICINE | Facility: OTHER | Age: 44
End: 2019-08-09

## 2019-08-09 DIAGNOSIS — Z30.2 ENCOUNTER FOR VASECTOMY: ICD-10-CM

## 2019-08-09 LAB
SPECIMEN VOL SMN: 1 ML
SPERM P VAS #/AREA SMN HPF: ABNORMAL /[HPF]

## 2019-08-09 PROCEDURE — 89310 SEMEN ANALYSIS W/COUNT: CPT | Performed by: UROLOGY

## 2019-08-09 NOTE — TELEPHONE ENCOUNTER
Patient called for lab results done recently for Dr. Aguirre. This writer instructed patient to contact urology to follow up with them to determine following steps.    Rochelle Doyle

## 2019-08-12 DIAGNOSIS — F41.9 ANXIETY: ICD-10-CM

## 2019-08-13 RX ORDER — ALPRAZOLAM 0.5 MG
TABLET ORAL
Qty: 20 TABLET | Refills: 0 | Status: SHIPPED | OUTPATIENT
Start: 2019-08-13 | End: 2019-10-07

## 2019-08-13 NOTE — TELEPHONE ENCOUNTER
Xanax      Last Written Prescription Date:  5/28/19  Last Fill Quantity: 20,   # refills: 0  Last Office Visit: 5/28/19  Future Office visit:       Routing refill request to provider for review/approval because:  Drug not on the [facility], [facility] or [facility] refill protocol or controlled substance

## 2019-08-15 ENCOUNTER — ANCILLARY PROCEDURE (OUTPATIENT)
Dept: GENERAL RADIOLOGY | Facility: OTHER | Age: 44
End: 2019-08-15
Attending: FAMILY MEDICINE
Payer: COMMERCIAL

## 2019-08-15 ENCOUNTER — TELEPHONE (OUTPATIENT)
Dept: FAMILY MEDICINE | Facility: OTHER | Age: 44
End: 2019-08-15

## 2019-08-15 ENCOUNTER — OFFICE VISIT (OUTPATIENT)
Dept: FAMILY MEDICINE | Facility: OTHER | Age: 44
End: 2019-08-15
Attending: FAMILY MEDICINE
Payer: COMMERCIAL

## 2019-08-15 VITALS
SYSTOLIC BLOOD PRESSURE: 144 MMHG | HEART RATE: 88 BPM | BODY MASS INDEX: 24.41 KG/M2 | TEMPERATURE: 98.5 F | DIASTOLIC BLOOD PRESSURE: 76 MMHG | OXYGEN SATURATION: 98 % | WEIGHT: 180 LBS

## 2019-08-15 DIAGNOSIS — F43.0 ACUTE REACTION TO STRESS: ICD-10-CM

## 2019-08-15 DIAGNOSIS — M54.2 NECK PAIN: ICD-10-CM

## 2019-08-15 DIAGNOSIS — R07.81 RIB PAIN ON LEFT SIDE: ICD-10-CM

## 2019-08-15 DIAGNOSIS — N50.89 GENITAL LESION, MALE: Primary | ICD-10-CM

## 2019-08-15 DIAGNOSIS — Z11.8 SPECIAL SCREENING EXAMINATION FOR CHLAMYDIAL DISEASE: Primary | ICD-10-CM

## 2019-08-15 LAB
C TRACH DNA SPEC QL PROBE+SIG AMP: ABNORMAL
N GONORRHOEA DNA SPEC QL PROBE+SIG AMP: NOT DETECTED
SPECIMEN SOURCE: ABNORMAL

## 2019-08-15 PROCEDURE — 99000 SPECIMEN HANDLING OFFICE-LAB: CPT | Performed by: FAMILY MEDICINE

## 2019-08-15 PROCEDURE — 99214 OFFICE O/P EST MOD 30 MIN: CPT | Performed by: FAMILY MEDICINE

## 2019-08-15 PROCEDURE — 87491 CHLMYD TRACH DNA AMP PROBE: CPT | Performed by: FAMILY MEDICINE

## 2019-08-15 PROCEDURE — 86695 HERPES SIMPLEX TYPE 1 TEST: CPT | Mod: 90 | Performed by: FAMILY MEDICINE

## 2019-08-15 PROCEDURE — 86694 HERPES SIMPLEX NES ANTBDY: CPT | Mod: 90 | Performed by: FAMILY MEDICINE

## 2019-08-15 PROCEDURE — 36415 COLL VENOUS BLD VENIPUNCTURE: CPT | Performed by: FAMILY MEDICINE

## 2019-08-15 PROCEDURE — 71046 X-RAY EXAM CHEST 2 VIEWS: CPT | Mod: TC

## 2019-08-15 PROCEDURE — 87389 HIV-1 AG W/HIV-1&-2 AB AG IA: CPT | Mod: 90 | Performed by: FAMILY MEDICINE

## 2019-08-15 PROCEDURE — 86780 TREPONEMA PALLIDUM: CPT | Mod: 90 | Performed by: FAMILY MEDICINE

## 2019-08-15 PROCEDURE — 87591 N.GONORRHOEAE DNA AMP PROB: CPT | Performed by: FAMILY MEDICINE

## 2019-08-15 PROCEDURE — 86696 HERPES SIMPLEX TYPE 2 TEST: CPT | Mod: 90 | Performed by: FAMILY MEDICINE

## 2019-08-15 RX ORDER — VALACYCLOVIR HYDROCHLORIDE 1 G/1
1000 TABLET, FILM COATED ORAL 3 TIMES DAILY
Qty: 21 TABLET | Refills: 0 | Status: SHIPPED | OUTPATIENT
Start: 2019-08-15 | End: 2019-09-07

## 2019-08-15 RX ORDER — HYDROCODONE BITARTRATE AND ACETAMINOPHEN 5; 325 MG/1; MG/1
TABLET ORAL
Qty: 40 TABLET | Refills: 0 | Status: SHIPPED | OUTPATIENT
Start: 2019-08-15 | End: 2019-10-07

## 2019-08-15 RX ORDER — AZITHROMYCIN 500 MG/1
1000 TABLET, FILM COATED ORAL DAILY
Qty: 2 TABLET | Refills: 0 | Status: SHIPPED | OUTPATIENT
Start: 2019-08-15 | End: 2019-10-22

## 2019-08-15 ASSESSMENT — PAIN SCALES - GENERAL: PAINLEVEL: MILD PAIN (3)

## 2019-08-15 NOTE — TELEPHONE ENCOUNTER
DATE:  8/15/2019   TIME OF RECEIPT FROM LAB:3:17  LAB TEST:  GC   LAB VALUE:  Positive   RESULTS GIVEN WITH READ-BACK TO (PROVIDER):  AMAYA ALCAZAR  TIME LAB VALUE REPORTED TO PROVIDER:   3:18 Kathy Duarte NP

## 2019-08-15 NOTE — TELEPHONE ENCOUNTER
8:59 AM    Reason for Call: OVERBOOK    Patient is having the following symptoms: Rib pain wrestling for 2 days    The patient is requesting an appointment for today  with Dr. Philip    Was an appointment offered for this call?  No    Preferred method for responding to this message: [phone]    If we cannot reach you directly, may we leave a detailed response at the number you provided?  Yes      Annamaria Espana

## 2019-08-15 NOTE — NURSING NOTE
No chief complaint on file.      Initial BP (!) 144/76   Pulse 88   Temp 98.5  F (36.9  C) (Tympanic)   Wt 81.6 kg (180 lb)   SpO2 98%   BMI 24.41 kg/m   Estimated body mass index is 24.41 kg/m  as calculated from the following:    Height as of 6/18/19: 1.829 m (6').    Weight as of this encounter: 81.6 kg (180 lb).  Medication Reconciliation: complete     Rochelle Doyle
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
## 2019-08-17 LAB
HSV1 IGG SERPL QL IA: 0.6 AI (ref 0–0.8)
HSV2 IGG SERPL QL IA: >8 AI (ref 0–0.8)
T PALLIDUM AB SER QL: NONREACTIVE

## 2019-08-19 LAB — HIV 1+2 AB+HIV1 P24 AG SERPL QL IA: NONREACTIVE

## 2019-08-20 LAB — HSV 1+2 IGM SER-IMP: 0.54 INDEX VALUE (ref 0–0.89)

## 2019-09-16 ENCOUNTER — OFFICE VISIT (OUTPATIENT)
Dept: CHIROPRACTIC MEDICINE | Facility: OTHER | Age: 44
End: 2019-09-16
Attending: CHIROPRACTOR
Payer: COMMERCIAL

## 2019-09-16 DIAGNOSIS — M54.2 CERVICALGIA: ICD-10-CM

## 2019-09-16 DIAGNOSIS — M99.02 SEGMENTAL AND SOMATIC DYSFUNCTION OF THORACIC REGION: ICD-10-CM

## 2019-09-16 DIAGNOSIS — M99.03 SEGMENTAL AND SOMATIC DYSFUNCTION OF LUMBAR REGION: ICD-10-CM

## 2019-09-16 DIAGNOSIS — M99.01 SEGMENTAL AND SOMATIC DYSFUNCTION OF CERVICAL REGION: Primary | ICD-10-CM

## 2019-09-16 PROCEDURE — 98941 CHIROPRACT MANJ 3-4 REGIONS: CPT | Mod: AT | Performed by: CHIROPRACTOR

## 2019-09-23 PROBLEM — F11.90 CHRONIC, CONTINUOUS USE OF OPIOIDS: Chronic | Status: ACTIVE | Noted: 2019-09-23

## 2019-10-01 DIAGNOSIS — R30.0 DYSURIA: Primary | ICD-10-CM

## 2019-10-01 LAB
C TRACH DNA SPEC QL PROBE+SIG AMP: NOT DETECTED
N GONORRHOEA DNA SPEC QL PROBE+SIG AMP: NOT DETECTED
SPECIMEN SOURCE: NORMAL

## 2019-10-01 PROCEDURE — 87591 N.GONORRHOEAE DNA AMP PROB: CPT | Performed by: FAMILY MEDICINE

## 2019-10-01 PROCEDURE — 87491 CHLMYD TRACH DNA AMP PROBE: CPT | Performed by: FAMILY MEDICINE

## 2019-10-04 ENCOUNTER — HOSPITAL ENCOUNTER (OUTPATIENT)
Dept: PHYSICAL THERAPY | Facility: HOSPITAL | Age: 44
Setting detail: THERAPIES SERIES
End: 2019-10-04
Attending: FAMILY MEDICINE
Payer: COMMERCIAL

## 2019-10-04 PROCEDURE — 97140 MANUAL THERAPY 1/> REGIONS: CPT | Mod: GP

## 2019-10-04 PROCEDURE — 97161 PT EVAL LOW COMPLEX 20 MIN: CPT | Mod: GP

## 2019-10-04 PROCEDURE — 97110 THERAPEUTIC EXERCISES: CPT | Mod: GP

## 2019-10-04 NOTE — PROGRESS NOTES
Initial Physical Therapy Evaluation      Name: Jese Joe MRN# [id number]   Age: 44 year old YOB: 1975     Date of Consultation: October 4, 2019  Primary care provider: Andrei Philip    Referring Physician: Andrei Philip  Orders: Eval and Treat  Medical Diagnosis: Shoulder and Neck Pain   Onset of Illness/Injury: 10/4/19     Reason for PT Visit: Patient is a 45 y/o male who presents with neck and shoulder pain.  Patient going through a divorce, causing lots of stress recently which is increasing his pain.  Patient has tried some stretches previously, has tried traction previously which has been unsuccessful.  Patient does go to chiropractor which does give him some relief of his spine lasting 1-2 weeks.  Patient feels the pain across his whole upper back.    Prior Level of Function: Patient was independent in all tasks previously w/ neck and shoulder pain    Pain: 2/10  Aching and Sharp 8/10     Community Support/Living Environment/Employment History: Works full time in the ER as a nurse      Patient/Family Goal: To have less pain w/ work     Fall Screen:   Have you fallen 2 or more times in the last year? No  Have you fallen and had an injury in the last year? No  Timed up & go: n/a  Is patient a fall risk? No    Past Medical History:   Past Medical History:   Diagnosis Date     History of steroid therapy      Stomach problems        Past Surgical History:  Past Surgical History:   Procedure Laterality Date     COLONOSCOPY N/A 6/18/2019    Procedure: COLONOSCOPY, WITH POLYPECTOMY AND BIOPSY;  Surgeon: Urszula Vides MD;  Location:  OR     ESOPHAGOSCOPY, GASTROSCOPY, DUODENOSCOPY (EGD), COMBINED N/A 6/18/2019    Procedure: ESOPHAGOGASTRODUODENOSCOPY, WITH BIOPSY;  Surgeon: Urszula Vides MD;  Location: [facility] OR     GENITOURINARY SURGERY       NO SURGICAL HISTORY[         Medications:   Current Outpatient Medications   Medication Sig     Acetaminophen (TYLENOL PO) Take 1,000 mg by mouth  every 6 hours as needed      albuterol (PROAIR HFA/PROVENTIL HFA/VENTOLIN HFA) 108 (90 Base) MCG/ACT inhaler Inhale 2 puffs into the lungs every 4 hours as needed for shortness of breath / dyspnea or wheezing     ALPRAZolam (XANAX) 0.5 MG tablet TAKE 1 TABLET BY MOUTH 2 TIMES DAILY AS NEEDED FOR ANXIETY     budesonide (ENTOCORT EC) 3 MG EC capsule Take 2 capsules (6 mg) by mouth every morning     cetirizine (ZYRTEC) 10 MG tablet Take 10 mg by mouth daily     fluticasone (FLONASE) 50 MCG/ACT spray Spray 1-2 sprays into both nostrils daily     HYDROcodone-acetaminophen (NORCO) 5-325 MG tablet 1 po bid prn.     ondansetron (ZOFRAN-ODT) 4 MG disintegrating tablet DISSOLVE 1 TABLET IN MOUTH EVERY 6 HOURS AS NEEDED FOR NAUSEA     valACYclovir (VALTREX) 1000 mg tablet TAKE 1 TABLET BY MOUTH THREE TIMES DAILY FOR 7 DAYS     No current facility-administered medications for this encounter.        Imaging:     Musculoskeletal Findings:     OBJECTIVE   Observation: Patient appears to PT in to acute distress  Palpation: Tenderness in posterior L shoulder, especially along Levator scap insertion and infraspinous fossa - also burning through this region w/ movement and lifting  Functional mobility: Normal    Posture: forward head, protracted shoulders  Sitting Posture: Poor   Standing Posture: Poor    Neurological Assessment:   Reflexes - Right:  Biceps:   Triceps:      Reflexes - Left:  Biceps:    Triceps:      Myotomes:  Right upper extremity: Neg   Left upper extremity: Neg    Dermatomes:   Right upper extremity: Neg  Left upper extremity: Neg    Range of Motion/Strength:   Cervical range of motion:   Neck Flexion - WNL - stretch at upper back  Neck Extension - Moderately Restricted - discomfort at end range  Neck Rotation - WNL each direction w/ stiffness present     Shoulder Range of Motion and Strength    RIGHT Shoulder ROM (Active)  Flexion: WNL  Abduction: WNL   Internal Rotation: WNL  Functional IR: WNL   External  Rotation: WNL  Functional ER: WNL     LEFT Shoulder ROM (Active)  Flexion: WNL - Painful arc, significant facial wincing w/ end range   Abduction: WNL - painful arc of motion  Internal Rotation: WNL  Functional IR: Able to perform full Functional IR - catch and burning in posterior shoulder w/ movement   External Rotation: WNL  Functional ER: Pain and burning in posterior shoulder w/ functional ER    Bilateral elbow, wrist, and hand range of motion and strength within normal limits.  Lower and middle trapezius strength 4/5 bilaterally.    STRENGTH                                                 Right                       Left  Flexion:                              5/5                         4/5  Extension:                          5/5                         5/5  IR:                                      5/5                         4/5 Pain  ER:                                     5/5                         4/5 - Pain   Abduction:                         5/5                         4/5 - Pain in posterior and anterior shoulder     Special Tests:  Cervical Spine Tests  Spurling: neg   Passive Intervertebral Movement Testing: Stiffness w/ PA mobility, reduction of symptoms  Distraction: reduction of neck symptoms   Vertebral Artery Test:     Shoulder Special Tests:  Neer s: Pos   [place]: Pos  Coracoid:   Crossarm: Pos for posterior shoulder pain   José Miguel s:   Drop-Arm: neg  Speeds: neg  Empty Can:   Yergason:   Crank:   External Rotation Lag Sign: neg  Sulcus Sign at 0 degrees: neg  Sulcus Sign at 90 degrees: neg      Functional Outcomes:   NDI: 14  SPADI: 46.15    Short-term goals:  To be achieved in 2-3 weeks:  Instruct in home program    1.) Patient will understand biomechanical stressors of the shoulder joint in order to make modifications to activities to reduce further risk of injury.  2.) Patient will be independent with a short-term home exercise program.  3.) Patient will report 25% improvement of  overall symptoms to allow decreased shoulder pain with daily movement and activity.  4.) Patient will be able to reach overhead repetitively to L shoulder end range w/o increase in pain.        Long-term goals:  To be achieved in 8 weeks:  Self management of symptoms  Pain free activities of daily living  Return to previous level of function    1.) Improve score on Shoulder Pain and Disability Index by 13 points to correlate with clinically significant change.  2.) Patient will report 50% improvement of overall symptoms to allow decreased shoulder pain with daily movement and activity  3.) Patient will be able to complete a full 12 hour work shift w/o increase in burning shoulder pain.   4.) Patient will be able to push and pull weights up to 30# and lift his canoe and gear in order to perform work tasks and recreational activities.    Discharge goals: Patient will be independent with a home program for self-management of symptoms.      Outcome Measures:   NDI - 14  SPADI - 46.15        Prognosis/Plan of Care: Patient has a good prognosis for the following goals   Appropriate for Physical Therapy Intervention: Yes     Goals:       Planned Interventions: Therapeutic exercise, manual therapy, therapeutic activity, patient education, Dry Needling     Patient presents today with signs and symptoms consistent of L shoulder pain w/ posterior burning pain, possible labral involvement, though no mechanism of injury.  Additional neck pain and discomfort likely related to overuse and significant computer use daily with work tasks.. Therapy today consisted of evaluation, patient education, and therapeutic exercise. Patient would benefit from continued PT sessions addressing overall pain and dysfunction with use of appropriate interventions.    Clinical Impressions:  Criteria for Skilled Therapeutic Intervention Met: Yes  PT Diagnosis: L Shoulder Pain, Neck Pain  Influenced by the following impairments: Decreased L shoulder  strength, Neck Pain and stiffness, Decreased L Shoulder ROM   Functional limitations due to impairment: Lifting, Reaching, Pushing/Pulling, Carrying   Clinical presentation: Stable/Uncomplicated  Clinical presentation rationale: Therapist Discretion   Clinical Decision making (complexity): Low Complexity  Predicted Duration of Therapy Intervention (days/wks): 2x  Risks and Benefits of therapy have been explained: Yes  Patient, Family & other staff in agreement with plan of care: Yes  Comments: Did recommend patient discuss referral for ortho for potential imaging based on findings   Frequency: 2x times/week  Date Range: 10/4/19 to 1/4/20      Total Evaluation Time: 30

## 2019-10-07 ENCOUNTER — TELEPHONE (OUTPATIENT)
Dept: FAMILY MEDICINE | Facility: OTHER | Age: 44
End: 2019-10-07

## 2019-10-07 DIAGNOSIS — M54.2 NECK PAIN: ICD-10-CM

## 2019-10-07 DIAGNOSIS — M25.512 CHRONIC PAIN OF BOTH SHOULDERS: Primary | ICD-10-CM

## 2019-10-07 DIAGNOSIS — M25.511 CHRONIC PAIN OF BOTH SHOULDERS: Primary | ICD-10-CM

## 2019-10-07 DIAGNOSIS — F41.9 ANXIETY: ICD-10-CM

## 2019-10-07 DIAGNOSIS — G89.29 CHRONIC PAIN OF BOTH SHOULDERS: Primary | ICD-10-CM

## 2019-10-07 RX ORDER — HYDROCODONE BITARTRATE AND ACETAMINOPHEN 5; 325 MG/1; MG/1
TABLET ORAL
Qty: 40 TABLET | Refills: 0 | Status: SHIPPED | OUTPATIENT
Start: 2019-10-07 | End: 2020-02-11

## 2019-10-07 RX ORDER — ALPRAZOLAM 0.5 MG
0.5 TABLET ORAL 2 TIMES DAILY PRN
Qty: 20 TABLET | Refills: 0 | Status: SHIPPED | OUTPATIENT
Start: 2019-10-07 | End: 2020-02-11

## 2019-10-07 RX ORDER — HYDROCODONE BITARTRATE AND ACETAMINOPHEN 5; 325 MG/1; MG/1
TABLET ORAL
Qty: 40 TABLET | Refills: 0 | Status: CANCELLED | OUTPATIENT
Start: 2019-10-07

## 2019-10-07 NOTE — TELEPHONE ENCOUNTER
Pt called requesting MRI of left shoulder and refill of [place] sent to [facility] in the clinic. Thank you!

## 2019-10-07 NOTE — TELEPHONE ENCOUNTER
Pt was notified and rx for norco was sent to [facility] via .

## 2019-10-07 NOTE — TELEPHONE ENCOUNTER
Pt is requesting an MRI of left shoulder.  He is currently in PT and it was also suggested by the therapist.  Pt also is requesting a refill of xanax and norco.  All orders pending.

## 2019-10-08 ENCOUNTER — HOSPITAL ENCOUNTER (OUTPATIENT)
Dept: PHYSICAL THERAPY | Facility: HOSPITAL | Age: 44
Setting detail: THERAPIES SERIES
End: 2019-10-08
Attending: FAMILY MEDICINE
Payer: COMMERCIAL

## 2019-10-08 PROCEDURE — 97110 THERAPEUTIC EXERCISES: CPT | Mod: GP

## 2019-10-08 PROCEDURE — 97140 MANUAL THERAPY 1/> REGIONS: CPT | Mod: GP

## 2019-10-10 ENCOUNTER — TELEPHONE (OUTPATIENT)
Dept: FAMILY MEDICINE | Facility: OTHER | Age: 44
End: 2019-10-10

## 2019-10-10 ENCOUNTER — HOSPITAL ENCOUNTER (OUTPATIENT)
Dept: MRI IMAGING | Facility: HOSPITAL | Age: 44
Discharge: HOME | End: 2019-10-10
Attending: FAMILY MEDICINE | Admitting: FAMILY MEDICINE
Payer: COMMERCIAL

## 2019-10-10 DIAGNOSIS — M25.511 CHRONIC PAIN OF BOTH SHOULDERS: ICD-10-CM

## 2019-10-10 DIAGNOSIS — M25.512 CHRONIC PAIN OF BOTH SHOULDERS: ICD-10-CM

## 2019-10-10 DIAGNOSIS — G89.29 CHRONIC PAIN OF BOTH SHOULDERS: ICD-10-CM

## 2019-10-10 PROCEDURE — 73221 MRI JOINT UPR EXTREM W/O DYE: CPT | Mod: TC,LT

## 2019-10-10 NOTE — TELEPHONE ENCOUNTER
4:56 PM    Reason for Call: Phone Call    Description:  Can pt run come to clinic at about 8 AM to get injections in his shoulder?     Was an appointment offered for this call? Yes  If yes : Appointment type              Date    Preferred method for responding to this message: Telephone Call  What is your phone number ?  [phone] Jese    If we cannot reach you directly, may we leave a detailed response at the number you provided? Yes    Can this message wait until your PCP/provider returns, if available today? No, Not applicable, PCP is in     [facility] Joan Farley

## 2019-10-16 NOTE — PROGRESS NOTES
Subjective     Jese Joe is a 44 year old male who presents to clinic today for the following health issues:    HPI   Musculoskeletal problem/pain      Duration: 4-5 months    Description  Location: left shoulder    Intensity:  moderate    Accompanying signs and symptoms: numbness and tingling    History  Previous similar problem: YES  Previous evaluation:  x-ray and MRI    Precipitating or alleviating factors:  Trauma or overuse: no   Aggravating factors include: lifting and overuse    Therapies tried and outcome: norco    PROBLEMS TO ADD ON...    Patient Active Problem List   Diagnosis     ACP (advance care planning)     SBO (small bowel obstruction) (H)     Chronic, continuous use of opioids     Past Surgical History:   Procedure Laterality Date     COLONOSCOPY N/A 6/18/2019    Procedure: COLONOSCOPY, WITH POLYPECTOMY AND BIOPSY;  Surgeon: Urszula Vides MD;  Location: [facility]     ESOPHAGOSCOPY, GASTROSCOPY, DUODENOSCOPY (EGD), COMBINED N/A 6/18/2019    Procedure: ESOPHAGOGASTRODUODENOSCOPY, WITH BIOPSY;  Surgeon: Urszula Vides MD;  Location: [facility]     GENITOURINARY SURGERY       NO SURGICAL HISTORY[         Social History     Tobacco Use     Smoking status: Light Tobacco Smoker     Packs/day: 0.50     Years: 20.00     Pack years: 10.00     Types: Cigarettes     Start date: 1/1/1987     Smokeless tobacco: Never Used   Substance Use Topics     Alcohol use: Yes     Comment: occasionally     Family History   Problem Relation Age of Onset     Other - See Comments Mother      Diabetes Maternal Grandfather      Heart Disease Paternal Grandfather         MI     Diabetes Other      Colon Cancer No family hx of          Current Outpatient Medications   Medication Sig Dispense Refill     Acetaminophen (TYLENOL PO) Take 1,000 mg by mouth every 6 hours as needed        albuterol (PROAIR HFA/PROVENTIL HFA/VENTOLIN HFA) 108 (90 Base) MCG/ACT inhaler Inhale 2 puffs into the lungs every 4 hours as needed for shortness  of breath / dyspnea or wheezing 1 Inhaler 0     ALPRAZolam (XANAX) 0.5 MG tablet Take 1 tablet (0.5 mg) by mouth 2 times daily as needed for anxiety 20 tablet 0     budesonide (ENTOCORT EC) 3 MG EC capsule Take 2 capsules (6 mg) by mouth every morning 60 capsule 1     cetirizine (ZYRTEC) 10 MG tablet Take 10 mg by mouth daily       fluticasone (FLONASE) 50 MCG/ACT spray Spray 1-2 sprays into both nostrils daily 1 Bottle 11     HYDROcodone-acetaminophen (NORCO) 5-325 MG tablet 1 po bid prn. 40 tablet 0     naloxone (NARCAN) 4 MG/0.1ML nasal spray Spray 1 spray (4 mg) into one nostril alternating nostrils once as needed for opioid reversal Every 2-3 minutes until patient responsive or EMS arrives 0.2 mL 0     ondansetron (ZOFRAN-ODT) 4 MG disintegrating tablet DISSOLVE 1 TABLET IN MOUTH EVERY 6 HOURS AS NEEDED FOR NAUSEA 20 tablet 0     valACYclovir (VALTREX) 1000 mg tablet TAKE 1 TABLET BY MOUTH THREE TIMES DAILY FOR 7 DAYS 21 tablet 0     Allergies   Allergen Reactions     Cleocin Hives       PROBLEMS TO ADD ON...  Reviewed and updated as needed this visit by Provider         Review of Systems   ROS COMP: Constitutional, HEENT, cardiovascular, pulmonary, gi and gu systems are negative, except as otherwise noted.      Objective    /78   Pulse 79   Wt 86.2 kg (190 lb)   SpO2 97%   BMI 25.77 kg/m    Body mass index is 25.77 kg/m .  Physical Exam   GENERAL: healthy, alert and no distress  NECK: no adenopathy, no asymmetry, masses, or scars and thyroid normal to palpation  MS: no gross musculoskeletal defects noted, no edema  MS: pain with any shoulder abduction on the left.      Diagnostic Test Results:  Labs reviewed in Epic        Assessment & Plan       ICD-10-CM    1. Acute pain of left shoulder M25.512 Large Joint/Bursa injection and/or drainage (Shoulder, Knee)     triamcinolone (KENALOG-40) injection 80 mg     lidocaine 1 % injection 5 mL   2. Insomnia, unspecified type G47.00 traZODone (DESYREL) 50 MG  tablet      for the sleep try trazodone.  Warned about SE of sedation, etc.  For the shoulder continue PT and we did the shot today.  F/u with ongoing concerns.    PROCEDURE:  JOINT ASPIRATION/INJECTION.         After a discussion of risks, benefits and side effects of procedure, informed patient consent was obtained.       The left subacromial bursa was prepped and draped in the usual clean fashion (sterile not required for this procedure).      ASPIRATION: Not performed.                               INJECTION:  Using 4 cc of 1% lidocaine mixed                           with 80 mg of triamcinolone, the left subacromial bursa was successfully injected                          without complication.  Patient did experience some pain                          relief following injection.    Tobacco Cessation:   reports that he has been smoking cigarettes. He started smoking about 32 years ago. He has a 10.00 pack-year smoking history. He has never used smokeless tobacco.  Tobacco Cessation Action Plan: Information offered: Patient not interested at this time      BMI:   Estimated body mass index is 25.77 kg/m  as calculated from the following:    Height as of 6/18/19: 1.829 m (6').    Weight as of this encounter: 86.2 kg (190 lb).               No follow-ups on file.    Andrei Philip MD  [facility]

## 2019-10-22 ENCOUNTER — OFFICE VISIT (OUTPATIENT)
Dept: FAMILY MEDICINE | Facility: OTHER | Age: 44
End: 2019-10-22
Attending: FAMILY MEDICINE
Payer: COMMERCIAL

## 2019-10-22 VITALS
SYSTOLIC BLOOD PRESSURE: 116 MMHG | DIASTOLIC BLOOD PRESSURE: 78 MMHG | OXYGEN SATURATION: 97 % | HEART RATE: 79 BPM | WEIGHT: 190 LBS | BODY MASS INDEX: 25.77 KG/M2

## 2019-10-22 DIAGNOSIS — G47.00 INSOMNIA, UNSPECIFIED TYPE: ICD-10-CM

## 2019-10-22 DIAGNOSIS — M25.512 ACUTE PAIN OF LEFT SHOULDER: Primary | ICD-10-CM

## 2019-10-22 PROCEDURE — 20610 DRAIN/INJ JOINT/BURSA W/O US: CPT | Mod: LT | Performed by: FAMILY MEDICINE

## 2019-10-22 PROCEDURE — 99213 OFFICE O/P EST LOW 20 MIN: CPT | Mod: 25 | Performed by: FAMILY MEDICINE

## 2019-10-22 RX ORDER — LIDOCAINE HYDROCHLORIDE 10 MG/ML
5 INJECTION, SOLUTION INFILTRATION; PERINEURAL ONCE
Status: COMPLETED | OUTPATIENT
Start: 2019-10-22 | End: 2019-10-22

## 2019-10-22 RX ORDER — TRAZODONE HYDROCHLORIDE 50 MG/1
50 TABLET, FILM COATED ORAL AT BEDTIME
Qty: 30 TABLET | Refills: 1 | Status: SHIPPED | OUTPATIENT
Start: 2019-10-22 | End: 2020-07-22

## 2019-10-22 RX ORDER — TRIAMCINOLONE ACETONIDE 40 MG/ML
80 INJECTION, SUSPENSION INTRA-ARTICULAR; INTRAMUSCULAR ONCE
Status: COMPLETED | OUTPATIENT
Start: 2019-10-22 | End: 2019-10-22

## 2019-10-22 RX ADMIN — TRIAMCINOLONE ACETONIDE 80 MG: 40 INJECTION, SUSPENSION INTRA-ARTICULAR; INTRAMUSCULAR at 08:27

## 2019-10-22 RX ADMIN — LIDOCAINE HYDROCHLORIDE 5 ML: 10 INJECTION, SOLUTION INFILTRATION; PERINEURAL at 08:23

## 2019-10-22 ASSESSMENT — PAIN SCALES - GENERAL: PAINLEVEL: MODERATE PAIN (5)

## 2019-10-22 NOTE — NURSING NOTE
Chief Complaint   Patient presents with     Shoulder Pain       Initial /78   Pulse 79   Wt 86.2 kg (190 lb)   SpO2 97%   BMI 25.77 kg/m   Estimated body mass index is 25.77 kg/m  as calculated from the following:    Height as of 6/18/19: 1.829 m (6').    Weight as of this encounter: 86.2 kg (190 lb).  Medication Reconciliation: complete  Soo Turcios LPN

## 2019-10-22 NOTE — NURSING NOTE
Prior to injection, verified patient identity using patient's name and date of birth.      Joint injection was performed.   left shoulder    Kenalog  Drug Amount Wasted:  Yes: 3 ml mg/ml   Vial/Syringe: Single dose vial  Expiration Date:  10/2020    Lidocaine  Drug Amount Wasted:  None.  Vial/Syringe: Single dose vial  Expiration Date:  10/2022  Soo Turcios

## 2019-11-10 ENCOUNTER — HOSPITAL ENCOUNTER (EMERGENCY)
Facility: HOSPITAL | Age: 44
Discharge: HOME | End: 2019-11-10
Attending: NURSE PRACTITIONER | Admitting: NURSE PRACTITIONER
Payer: COMMERCIAL

## 2019-11-10 VITALS
OXYGEN SATURATION: 98 % | RESPIRATION RATE: 18 BRPM | BODY MASS INDEX: 25.77 KG/M2 | SYSTOLIC BLOOD PRESSURE: 156 MMHG | WEIGHT: 190 LBS | DIASTOLIC BLOOD PRESSURE: 96 MMHG | TEMPERATURE: 97.8 F

## 2019-11-10 DIAGNOSIS — J06.9 VIRAL URI WITH COUGH: ICD-10-CM

## 2019-11-10 LAB
DEPRECATED S PYO AG THROAT QL EIA: NORMAL
SPECIMEN SOURCE: NORMAL

## 2019-11-10 PROCEDURE — 99213 OFFICE O/P EST LOW 20 MIN: CPT | Mod: Z6 | Performed by: NURSE PRACTITIONER

## 2019-11-10 PROCEDURE — G0463 HOSPITAL OUTPT CLINIC VISIT: HCPCS

## 2019-11-10 PROCEDURE — 87880 STREP A ASSAY W/OPTIC: CPT | Performed by: FAMILY MEDICINE

## 2019-11-10 PROCEDURE — 87081 CULTURE SCREEN ONLY: CPT | Performed by: FAMILY MEDICINE

## 2019-11-10 RX ORDER — CODEINE PHOSPHATE AND GUAIFENESIN 10; 100 MG/5ML; MG/5ML
1-2 SOLUTION ORAL EVERY 4 HOURS PRN
Qty: 180 ML | Refills: 0 | Status: SHIPPED | OUTPATIENT
Start: 2019-11-10 | End: 2020-03-09

## 2019-11-10 RX ORDER — ALBUTEROL SULFATE 90 UG/1
2 AEROSOL, METERED RESPIRATORY (INHALATION) EVERY 4 HOURS PRN
Qty: 1 INHALER | Refills: 3 | Status: SHIPPED | OUTPATIENT
Start: 2019-11-10 | End: 2022-04-05

## 2019-11-10 ASSESSMENT — ENCOUNTER SYMPTOMS
SORE THROAT: 1
TROUBLE SWALLOWING: 0
HEADACHES: 1
SHORTNESS OF BREATH: 0
LIGHT-HEADEDNESS: 0
CHILLS: 1
MYALGIAS: 1
WHEEZING: 0
DIZZINESS: 0
CHEST TIGHTNESS: 1
NAUSEA: 0
RHINORRHEA: 1
APPETITE CHANGE: 0
VOICE CHANGE: 1
EYES NEGATIVE: 1
ACTIVITY CHANGE: 0
COUGH: 1
SINUS PRESSURE: 0

## 2019-11-10 NOTE — ED PROVIDER NOTES
History     Chief Complaint   Patient presents with     Pharyngitis     HPI  Jese Joe is a 44 year old male who has had nasal congestion, sore throat, and cough for the past 4 days. Chills and myalgias at the onset of illness. His son had strep throat about one week ago, so Jese would like to make sure he does not have strep in addition to URI.    He is also requesting a refill of his albuterol inhaler, and Robitussin AC to use at night, as he is coughing continuously throughout the night and having difficulty sleeping.    Allergies:  Allergies   Allergen Reactions     Cleocin Hives       Problem List:    Patient Active Problem List    Diagnosis Date Noted     Chronic, continuous use of opioids 09/23/2019     Priority: Medium     SBO (small bowel obstruction) (H) 03/26/2019     Priority: Medium     ACP (advance care planning) 07/31/2017     Priority: Medium     Advance Care Planning 7/31/2017: ACP Review of Chart / Resources Provided:  Reviewed chart for advance care plan.  Jese Joe has no plan or code status on file. Discussed available resources and provided with information.   Added by Indira Santoro                  Past Medical History:    Past Medical History:   Diagnosis Date     History of steroid therapy      Stomach problems        Past Surgical History:    Past Surgical History:   Procedure Laterality Date     COLONOSCOPY N/A 6/18/2019    Procedure: COLONOSCOPY, WITH POLYPECTOMY AND BIOPSY;  Surgeon: Urszula Vides MD;  [facility]     ESOPHAGOSCOPY, GASTROSCOPY, DUODENOSCOPY (EGD), COMBINED N/A 6/18/2019    Procedure: ESOPHAGOGASTRODUODENOSCOPY, WITH BIOPSY;  Surgeon: Urszula Vides MD;  [facility]     GENITOURINARY SURGERY       NO SURGICAL HISTORY[         Family History:    Family History   Problem Relation Age of Onset     Other - See Comments Mother      Diabetes Maternal Grandfather      Heart Disease Paternal Grandfather         MI     Diabetes Other      Colon Cancer  No family hx of        Social History:  Marital Status:   [2]  Social History     Tobacco Use     Smoking status: Light Tobacco Smoker     Packs/day: 0.50     Years: 20.00     Pack years: 10.00     Types: Cigarettes     Start date: 1/1/1987     Smokeless tobacco: Never Used   Substance Use Topics     Alcohol use: Yes     Comment: occasionally     Drug use: No        Medications:    Acetaminophen (TYLENOL PO)  albuterol (PROAIR HFA/PROVENTIL HFA/VENTOLIN HFA) 108 (90 Base) MCG/ACT inhaler  ALPRAZolam (XANAX) 0.5 MG tablet  cetirizine (ZYRTEC) 10 MG tablet  fluticasone (FLONASE) 50 MCG/ACT spray  guaiFENesin-codeine (ROBITUSSIN AC) 100-10 MG/5ML solution  HYDROcodone-acetaminophen (NORCO) 5-325 MG tablet  traZODone (DESYREL) 50 MG tablet  budesonide (ENTOCORT EC) 3 MG EC capsule  naloxone (NARCAN) 4 MG/0.1ML nasal spray  ondansetron (ZOFRAN-ODT) 4 MG disintegrating tablet  valACYclovir (VALTREX) 1000 mg tablet          Review of Systems   Constitutional: Positive for chills. Negative for activity change and appetite change.   HENT: Positive for congestion, ear pain, postnasal drip, rhinorrhea, sneezing, sore throat and voice change. Negative for sinus pressure and trouble swallowing.    Eyes: Negative.    Respiratory: Positive for cough and chest tightness. Negative for shortness of breath and wheezing.    Cardiovascular: Negative for chest pain.   Gastrointestinal: Negative for nausea.   Musculoskeletal: Positive for myalgias.   Skin: Negative for rash.   Neurological: Positive for headaches. Negative for dizziness and light-headedness.   All other systems reviewed and are negative.      Physical Exam   BP: 156/96  Heart Rate: 73  Temp: 97.8  F (36.6  C)  Resp: 18  Weight: 86.2 kg (190 lb)  SpO2: 98 %      Physical Exam  Constitutional:       General: He is not in acute distress.     Appearance: He is not toxic-appearing.   HENT:      Right Ear: Tympanic membrane and ear canal normal.      Left Ear: Tympanic  membrane and ear canal normal.      Nose: Congestion present.      Mouth/Throat:      Mouth: Mucous membranes are moist. No oral lesions.      Pharynx: Uvula midline. Posterior oropharyngeal erythema present. No oropharyngeal exudate or uvula swelling.      Tonsils: Swellin+ on the right. 1+ on the left.   Eyes:      Conjunctiva/sclera: Conjunctivae normal.      Pupils: Pupils are equal, round, and reactive to light.   Neck:      Musculoskeletal: Normal range of motion and neck supple.      Thyroid: No thyromegaly.   Cardiovascular:      Rate and Rhythm: Normal rate and regular rhythm.      Heart sounds: Normal heart sounds. No murmur.   Pulmonary:      Effort: Pulmonary effort is normal.      Breath sounds: Normal breath sounds.   Lymphadenopathy:      Cervical: No cervical adenopathy.   Skin:     General: Skin is warm and dry.      Capillary Refill: Capillary refill takes less than 2 seconds.   Neurological:      General: No focal deficit present.      Mental Status: He is alert and oriented to person, place, and time.   Psychiatric:         Mood and Affect: Mood normal.         Behavior: Behavior normal.         ED Course        Procedures         Results for orders placed or performed during the hospital encounter of 11/10/19 (from the past 24 hour(s))   Rapid strep screen   Result Value Ref Range    Specimen Description Throat     Rapid Strep A Screen       NEGATIVE: No Group A streptococcal antigen detected by immunoassay, await culture report.       Medications - No data to display    Assessments & Plan (with Medical Decision Making)     I have reviewed the nursing notes.    I have reviewed the findings, diagnosis, plan and need for follow up with the patient.   Rapid strep negative; will follow culture. Viral URI with cough. Symptomatic treatment: push fluids, humidification. May take Robitussin AC at bedtime for cough. Do not take if needing oxy for shoulder pain. Jese reports he has not needed oxy due  to shoulder injection last month, which has improved his pain. Follow up with Dr. Philip if symptoms are not improving within 4 days. Return to urgent care or ER if symptoms are worsening. Jese is agreeable to plan and discharged to home in stable condition.    Albuterol inhaler refilled per request.      Discharge Medication List as of 11/10/2019  4:22 PM      START taking these medications    Details   guaiFENesin-codeine (ROBITUSSIN AC) 100-10 MG/5ML solution Take 5-10 mLs by mouth every 4 hours as needed for cough, Disp-180 mL, R-0, Local Print             Final diagnoses:   Viral URI with cough       11/10/2019   [facility]     Silvia Armijo, APRN CNP  11/10/19 [id number]

## 2019-11-10 NOTE — ED AVS SNAPSHOT
[facility]  [address] [address]  Phone:  [phone]                                    Jese Joe   MRN: [id number]    Department:  [facility]   Date of Visit:  11/10/2019           After Visit Summary Signature Page    I have received my discharge instructions, and my questions have been answered. I have discussed any challenges I see with this plan with the nurse or doctor.    ..........................................................................................................................................  Patient/Patient Representative Signature      ..........................................................................................................................................  Patient Representative Print Name and Relationship to Patient    ..................................................               ................................................  Date                                   Time    ..........................................................................................................................................  Reviewed by Signature/Title    ...................................................              ..............................................  Date                                               Time          22EPIC Rev 08/18

## 2019-11-10 NOTE — ED TRIAGE NOTES
Pt presents with a sore throat for 3 days along with cough and nasal congestion. Took Tylenol at 0800.

## 2019-11-12 LAB
BACTERIA SPEC CULT: NORMAL
SPECIMEN SOURCE: NORMAL

## 2019-11-26 ENCOUNTER — TELEPHONE (OUTPATIENT)
Dept: GASTROENTEROLOGY | Facility: CLINIC | Age: 44
End: 2019-11-26

## 2019-11-26 DIAGNOSIS — K56.609 SBO (SMALL BOWEL OBSTRUCTION) (H): Primary | ICD-10-CM

## 2019-11-26 NOTE — TELEPHONE ENCOUNTER
[facility] Call Center    Phone Message    May a detailed message be left on voicemail: yes    Reason for Call: Order(s): Other:   Reason for requested: colonscopy  Date needed: asap  Provider name:     -Please call pt once complete to schedule      Action Taken: Message routed to:  [facility] CHINY[facility]

## 2019-11-26 NOTE — TELEPHONE ENCOUNTER
Previous colonoscopy  procedure note on 06/18/2019 states below:    Repeat colonoscopy in 6 months to check healing while                        completely off all NSAIDs. If there are residual ulcers,                        then diagnosis is likely Crohn's disease. Given history                        of an SBO, would start biologic therapy (prefer                        ustekinumab given excellent safety profile).     Order placed for repeat colonoscopy with any provider.  Endoscopy will call and schedule the patient for procedure

## 2019-12-12 ENCOUNTER — OFFICE VISIT (OUTPATIENT)
Dept: CHIROPRACTIC MEDICINE | Facility: OTHER | Age: 44
End: 2019-12-12
Attending: CHIROPRACTOR
Payer: COMMERCIAL

## 2019-12-12 DIAGNOSIS — M99.02 SEGMENTAL AND SOMATIC DYSFUNCTION OF THORACIC REGION: ICD-10-CM

## 2019-12-12 DIAGNOSIS — M99.01 SEGMENTAL AND SOMATIC DYSFUNCTION OF CERVICAL REGION: Primary | ICD-10-CM

## 2019-12-12 DIAGNOSIS — M99.03 SEGMENTAL AND SOMATIC DYSFUNCTION OF LUMBAR REGION: ICD-10-CM

## 2019-12-12 DIAGNOSIS — M54.2 CERVICALGIA: ICD-10-CM

## 2019-12-12 PROCEDURE — 98941 CHIROPRACT MANJ 3-4 REGIONS: CPT | Mod: AT | Performed by: CHIROPRACTOR

## 2019-12-22 DIAGNOSIS — R39.9 URINARY TRACT INFECTION SYMPTOMS: Primary | ICD-10-CM

## 2019-12-22 PROCEDURE — 87491 CHLMYD TRACH DNA AMP PROBE: CPT | Performed by: PHYSICIAN ASSISTANT

## 2019-12-22 PROCEDURE — 87591 N.GONORRHOEAE DNA AMP PROB: CPT | Performed by: PHYSICIAN ASSISTANT

## 2020-01-08 ENCOUNTER — TELEPHONE (OUTPATIENT)
Dept: GASTROENTEROLOGY | Facility: CLINIC | Age: 45
End: 2020-01-08

## 2020-01-23 DIAGNOSIS — R35.0 URINARY FREQUENCY: Primary | ICD-10-CM

## 2020-02-04 ENCOUNTER — TELEPHONE (OUTPATIENT)
Dept: FAMILY MEDICINE | Facility: OTHER | Age: 45
End: 2020-02-04

## 2020-02-04 NOTE — TELEPHONE ENCOUNTER
11:57 AM    Reason for Call: OVERBOOK    Patient is having the following symptoms: shoulder pain for 2 weeks.    The patient is requesting an appointment for overbook with aiyana burnham.    Was an appointment offered for this call? Yes  If yes : Appointment type              Date 02/25/20     Preferred method for responding to this message: Telephone Call  What is your phone number ? [phone]    If we cannot reach you directly, may we leave a detailed response at the number you provided? Yes    Can this message wait until your PCP/provider returns, if unavailable today? YES,     Chivo Bach

## 2020-02-05 NOTE — PROGRESS NOTES
Subjective     Jese Joe is a 45 year old male who presents to clinic today for the following health issues:    HPI   Musculoskeletal problem/pain      Duration: 1 year    Description  Location: left shoulder    Intensity:  moderate    Accompanying signs and symptoms: numbness, tingling and weakness of left arm    History  Previous similar problem: YES  Previous evaluation:  x-ray and MRI    Precipitating or alleviating factors:  Trauma or overuse: no   Aggravating factors include: lifting and overuse    Therapies tried and outcome: steroid injection    PROBLEMS TO ADD ON...    Patient Active Problem List   Diagnosis     ACP (advance care planning)     SBO (small bowel obstruction) (H)     Chronic, continuous use of opioids     Past Surgical History:   Procedure Laterality Date     COLONOSCOPY N/A 6/18/2019    Procedure: COLONOSCOPY, WITH POLYPECTOMY AND BIOPSY;  Surgeon: Urszula Vides MD;  [facility]     ESOPHAGOSCOPY, GASTROSCOPY, DUODENOSCOPY (EGD), COMBINED N/A 6/18/2019    Procedure: ESOPHAGOGASTRODUODENOSCOPY, WITH BIOPSY;  Surgeon: Urszula Vides MD;  [facility]: [facility]     GENITOURINARY SURGERY       NO SURGICAL HISTORY[         Social History     Tobacco Use     Smoking status: Light Tobacco Smoker     Packs/day: 0.50     Years: 33.00     Pack years: 16.50     Types: Cigarettes     Start date: 1/1/1987     Smokeless tobacco: Never Used   Substance Use Topics     Alcohol use: Yes     Comment: occasionally     Family History   Problem Relation Age of Onset     Other - See Comments Mother      Diabetes Maternal Grandfather      Heart Disease Paternal Grandfather         MI     Diabetes Other      Colon Cancer No family hx of          Current Outpatient Medications   Medication Sig Dispense Refill     Acetaminophen (TYLENOL PO) Take 1,000 mg by mouth every 6 hours as needed        albuterol (PROAIR HFA/PROVENTIL HFA/VENTOLIN HFA) 108 (90 Base) MCG/ACT inhaler Inhale 2 puffs into the lungs every 4  hours as needed for shortness of breath / dyspnea or wheezing 1 Inhaler 3     ALPRAZolam (XANAX) 0.5 MG tablet Take 1 tablet (0.5 mg) by mouth 2 times daily as needed for anxiety 20 tablet 0     budesonide (ENTOCORT EC) 3 MG EC capsule Take 2 capsules (6 mg) by mouth every morning 60 capsule 1     cetirizine (ZYRTEC) 10 MG tablet Take 10 mg by mouth daily       fluticasone (FLONASE) 50 MCG/ACT spray Spray 1-2 sprays into both nostrils daily 1 Bottle 11     guaiFENesin-codeine (ROBITUSSIN AC) 100-10 MG/5ML solution Take 5-10 mLs by mouth every 4 hours as needed for cough 180 mL 0     HYDROcodone-acetaminophen (NORCO) 5-325 MG tablet 1 po bid prn. 40 tablet 0     naloxone (NARCAN) 4 MG/0.1ML nasal spray Spray 1 spray (4 mg) into one nostril alternating nostrils once as needed for opioid reversal Every 2-3 minutes until patient responsive or EMS arrives 0.2 mL 0     ondansetron (ZOFRAN-ODT) 4 MG disintegrating tablet DISSOLVE 1 TABLET IN MOUTH EVERY 6 HOURS AS NEEDED FOR NAUSEA 20 tablet 0     traZODone (DESYREL) 50 MG tablet Take 1 tablet (50 mg) by mouth At Bedtime 30 tablet 1     valACYclovir (VALTREX) 1000 mg tablet TAKE 1 TABLET BY MOUTH THREE TIMES DAILY FOR 7 DAYS 21 tablet 0     Allergies   Allergen Reactions     Cleocin Hives       PROBLEMS TO ADD ON...  Reviewed and updated as needed this visit by Provider         Review of Systems   ROS COMP: Constitutional, HEENT, cardiovascular, pulmonary, gi and gu systems are negative, except as otherwise noted.      Objective    /70   Pulse 120   Wt 82.1 kg (181 lb)   SpO2 96%   BMI 24.55 kg/m    Body mass index is 24.55 kg/m .  Physical Exam   GENERAL: healthy, alert and no distress  NECK: no adenopathy, no asymmetry, masses, or scars and thyroid normal to palpation  RESP: lungs clear to auscultation - no rales, rhonchi or wheezes  CV: regular rate and rhythm, normal S1 S2, no S3 or S4, no murmur, click or rub, no peripheral edema and peripheral pulses  strong  ABDOMEN: soft, nontender, no hepatosplenomegaly, no masses and bowel sounds normal  MS: pain with shoulder abduction laterally especially.     Diagnostic Test Results:  Labs reviewed in Epic        Assessment & Plan       ICD-10-CM    1. Lipid screening Z13.220 Comprehensive metabolic panel     Lipid Profile     ORTHOPEDICS ADULT REFERRAL   2. Chronic left shoulder pain M25.512     G89.29    3. Neck pain M54.2 HYDROcodone-acetaminophen (NORCO) 5-325 MG tablet   4. Anxiety F41.9 ALPRAZolam (XANAX) 0.5 MG tablet        Doing ok.  Overall stable.  Left shoulder pain ongoing and severe.  Refill lortab for night.  Xanax for anxiety never combine the two.  He is very aware.  flonase refill.  Annual labs when fasting.   fmla paperwork filled out for the shoulder.         No follow-ups on file.    Andrei Philip MD  [facility]

## 2020-02-11 ENCOUNTER — OFFICE VISIT (OUTPATIENT)
Dept: FAMILY MEDICINE | Facility: OTHER | Age: 45
End: 2020-02-11
Attending: FAMILY MEDICINE
Payer: COMMERCIAL

## 2020-02-11 VITALS
DIASTOLIC BLOOD PRESSURE: 70 MMHG | SYSTOLIC BLOOD PRESSURE: 114 MMHG | HEART RATE: 120 BPM | OXYGEN SATURATION: 96 % | BODY MASS INDEX: 24.55 KG/M2 | WEIGHT: 181 LBS

## 2020-02-11 DIAGNOSIS — G89.29 CHRONIC LEFT SHOULDER PAIN: ICD-10-CM

## 2020-02-11 DIAGNOSIS — Z13.220 LIPID SCREENING: Primary | ICD-10-CM

## 2020-02-11 DIAGNOSIS — M25.512 CHRONIC LEFT SHOULDER PAIN: ICD-10-CM

## 2020-02-11 DIAGNOSIS — M54.2 NECK PAIN: ICD-10-CM

## 2020-02-11 DIAGNOSIS — R11.0 NAUSEA: ICD-10-CM

## 2020-02-11 DIAGNOSIS — F41.9 ANXIETY: ICD-10-CM

## 2020-02-11 DIAGNOSIS — J98.01 BRONCHOSPASM: ICD-10-CM

## 2020-02-11 PROCEDURE — 99214 OFFICE O/P EST MOD 30 MIN: CPT | Performed by: FAMILY MEDICINE

## 2020-02-11 RX ORDER — HYDROCODONE BITARTRATE AND ACETAMINOPHEN 5; 325 MG/1; MG/1
TABLET ORAL
Qty: 40 TABLET | Refills: 0 | Status: SHIPPED | OUTPATIENT
Start: 2020-02-11 | End: 2020-09-10

## 2020-02-11 RX ORDER — ONDANSETRON 4 MG/1
TABLET, ORALLY DISINTEGRATING ORAL
Qty: 20 TABLET | Refills: 0 | Status: SHIPPED | OUTPATIENT
Start: 2020-02-11

## 2020-02-11 RX ORDER — FLUTICASONE PROPIONATE 50 MCG
1-2 SPRAY, SUSPENSION (ML) NASAL DAILY
Qty: 15.8 G | Refills: 3 | Status: SHIPPED | OUTPATIENT
Start: 2020-02-11 | End: 2020-11-23

## 2020-02-11 RX ORDER — ALPRAZOLAM 0.5 MG
0.5 TABLET ORAL 2 TIMES DAILY PRN
Qty: 20 TABLET | Refills: 0 | Status: SHIPPED | OUTPATIENT
Start: 2020-02-11 | End: 2020-05-13

## 2020-02-11 ASSESSMENT — PAIN SCALES - GENERAL: PAINLEVEL: MILD PAIN (3)

## 2020-02-11 NOTE — TELEPHONE ENCOUNTER
DIAGNOSIS: Chronic left shoulder pain , Referred by Dr. Philip, no new images. Per INDIO Holman   APPOINTMENT DATE: Mar 4, 2020    NOTES STATUS DETAILS   OFFICE NOTE from referring provider Internal 02/11/20 Dr. Philip   OFFICE NOTE from other specialist N/A    DISCHARGE SUMMARY from hospital N/A    DISCHARGE REPORT from the ER N/A    OPERATIVE REPORT N/A    MEDICATION LIST Internal    IMPLANT RECORD/STICKER N/A    LABS     CBC/DIFF N/A    CULTURES N/A    INJECTIONS DONE IN RADIOLOGY N/A    MRI Internal Internal 10/10/19   CT SCAN N/A    XRAYS (IMAGES & REPORTS) Internal Internal 5/28/19, 7/23/17   TUMOR     PATHOLOGY  Slides & report N/A

## 2020-02-11 NOTE — NURSING NOTE
Chief Complaint   Patient presents with     Musculoskeletal Problem       Initial /70   Pulse 120   Wt 82.1 kg (181 lb)   SpO2 96%   BMI 24.55 kg/m   Estimated body mass index is 24.55 kg/m  as calculated from the following:    Height as of 6/18/19: 1.829 m (6').    Weight as of this encounter: 82.1 kg (181 lb).  Medication Reconciliation: complete  Soo Turcios LPN

## 2020-02-12 DIAGNOSIS — R35.0 URINARY FREQUENCY: ICD-10-CM

## 2020-02-12 LAB
ALBUMIN UR-MCNC: 10 MG/DL
APPEARANCE UR: CLEAR
BACTERIA #/AREA URNS HPF: ABNORMAL /HPF
BILIRUB UR QL STRIP: NEGATIVE
COLOR UR AUTO: YELLOW
GLUCOSE UR STRIP-MCNC: NEGATIVE MG/DL
HGB UR QL STRIP: NEGATIVE
KETONES UR STRIP-MCNC: NEGATIVE MG/DL
LEUKOCYTE ESTERASE UR QL STRIP: NEGATIVE
MUCOUS THREADS #/AREA URNS LPF: PRESENT /LPF
NITRATE UR QL: NEGATIVE
PH UR STRIP: 6 PH (ref 4.7–8)
PSA SERPL-MCNC: 1.1 UG/L (ref 0–4)
RBC #/AREA URNS AUTO: 1 /HPF (ref 0–2)
SOURCE: ABNORMAL
SP GR UR STRIP: 1.03 (ref 1–1.03)
UROBILINOGEN UR STRIP-MCNC: 2 MG/DL (ref 0–2)
WBC #/AREA URNS AUTO: 1 /HPF (ref 0–5)

## 2020-02-12 PROCEDURE — 36415 COLL VENOUS BLD VENIPUNCTURE: CPT | Performed by: UROLOGY

## 2020-02-12 PROCEDURE — 81001 URINALYSIS AUTO W/SCOPE: CPT | Performed by: UROLOGY

## 2020-02-12 PROCEDURE — 84153 ASSAY OF PSA TOTAL: CPT | Performed by: UROLOGY

## 2020-02-13 ENCOUNTER — OFFICE VISIT (OUTPATIENT)
Dept: UROLOGY | Facility: OTHER | Age: 45
End: 2020-02-13
Attending: UROLOGY
Payer: COMMERCIAL

## 2020-02-13 VITALS
WEIGHT: 180 LBS | HEART RATE: 70 BPM | TEMPERATURE: 97.1 F | DIASTOLIC BLOOD PRESSURE: 70 MMHG | BODY MASS INDEX: 24.41 KG/M2 | SYSTOLIC BLOOD PRESSURE: 110 MMHG | OXYGEN SATURATION: 98 %

## 2020-02-13 DIAGNOSIS — R35.0 URINARY FREQUENCY: Primary | ICD-10-CM

## 2020-02-13 LAB — COPATH REPORT: NORMAL

## 2020-02-13 PROCEDURE — 99202 OFFICE O/P NEW SF 15 MIN: CPT | Performed by: UROLOGY

## 2020-02-13 PROCEDURE — 88108 CYTOPATH CONCENTRATE TECH: CPT | Mod: TC | Performed by: UROLOGY

## 2020-02-13 ASSESSMENT — PAIN SCALES - GENERAL: PAINLEVEL: NO PAIN (0)

## 2020-02-13 NOTE — PROGRESS NOTES
History     Chief Complaint:    Urinary Frequency (NPT)      HPI   Jese Joe is a 45 year old male who presents with a history of urinary frequency.  Dougie is 45 and he states that he has had urinary frequency likely all of his life.  He gets up 1-2 times a night and voids about every 2 hours during the day.  He denies any incontinence and he is not had any blood in the urine or urinary tract infections.  He has not had any treatment for this frequency.  He does drink about 4 cups of coffee a day which increases his frequency and beer really causes a significant increase in his urine frequency.  He does not remember being a bedwetter but he did have one brother that was a significant bedwetter.  He is currently states his bowel movements are normal but is being tested for possible Crohn's disease so sometimes does have some irritable bowel.    Allergies:    Allergies   Allergen Reactions     Cleocin Hives        Medications:      Acetaminophen (TYLENOL PO)  albuterol (PROAIR HFA/PROVENTIL HFA/VENTOLIN HFA) 108 (90 Base) MCG/ACT inhaler  ALPRAZolam (XANAX) 0.5 MG tablet  cetirizine (ZYRTEC) 10 MG tablet  fluticasone (FLONASE) 50 MCG/ACT nasal spray  HYDROcodone-acetaminophen (NORCO) 5-325 MG tablet  ondansetron (ZOFRAN-ODT) 4 MG ODT tab  traZODone (DESYREL) 50 MG tablet  valACYclovir (VALTREX) 1000 mg tablet  budesonide (ENTOCORT EC) 3 MG EC capsule  guaiFENesin-codeine (ROBITUSSIN AC) 100-10 MG/5ML solution        Problem List:      Patient Active Problem List    Diagnosis Date Noted     Chronic, continuous use of opioids 09/23/2019     Priority: Medium     SBO (small bowel obstruction) (H) 03/26/2019     Priority: Medium     ACP (advance care planning) 07/31/2017     Priority: Medium     Advance Care Planning 7/31/2017: ACP Review of Chart / Resources Provided:  Reviewed chart for advance care plan.  Jese Joe has no plan or code status on file. Discussed available resources and provided with  information.   Added by Indira Santoro                  Past Medical History:      Past Medical History:   Diagnosis Date     History of steroid therapy      Stomach problems        Past Surgical History:      Past Surgical History:   Procedure Laterality Date     COLONOSCOPY N/A 6/18/2019    Procedure: COLONOSCOPY, WITH POLYPECTOMY AND BIOPSY;  Surgeon: Urszula Vides MD;  Location: [facility]     ESOPHAGOSCOPY, GASTROSCOPY, DUODENOSCOPY (EGD), COMBINED N/A 6/18/2019    Procedure: ESOPHAGOGASTRODUODENOSCOPY, WITH BIOPSY;  Surgeon: Urszula Vides MD;  Location: [facility] OR     GENITOURINARY SURGERY       NO SURGICAL HISTORY[         Family History:      Family History   Problem Relation Age of Onset     Other - See Comments Mother      Diabetes Maternal Grandfather      Heart Disease Paternal Grandfather         MI     Diabetes Other      Colon Cancer No family hx of        Social History:  Dougie is currently going through a divorce.    Marital Status:   [2]  Social History     Tobacco Use     Smoking status: Light Tobacco Smoker     Packs/day: 0.25     Years: 33.00     Pack years: 8.25     Types: Cigarettes     Start date: 1/1/1987     Smokeless tobacco: Never Used   Substance Use Topics     Alcohol use: Yes     Comment: occasionally     Drug use: No        Review of Systems   All other systems reviewed and are negative.        Physical Exam   Vitals:  /70   Pulse 70   Temp 97.1  F (36.2  C) (Tympanic)   Wt 81.6 kg (180 lb)   SpO2 98%   BMI 24.41 kg/m        Physical Exam  Constitutional:       Appearance: Normal appearance. He is normal weight.   Abdominal:      General: Abdomen is flat. There is no distension.      Palpations: Abdomen is soft. There is no mass.      Tenderness: There is no abdominal tenderness.      Hernia: No hernia is present. There is no hernia in the right inguinal area or left inguinal area.   Genitourinary:     Penis: Normal and circumcised.       Scrotum/Testes: Normal.          Right: Mass, tenderness or swelling not present.         Left: Mass, tenderness or swelling not present.      Epididymis:      Right: Normal.      Left: Normal.      Prostate: Normal.      Rectum: Normal.      Comments: Sternal rectal area is normal normal rectal tone prostates about 15 g and feels smooth.  Neurological:      Mental Status: He is alert.       Exam Date Exam Time Accession # Results    2/12/20  3:41 PM [id number]    Component Value Flag Ref Range Units Status Collected Lab   PSA 1.10   0 - 4 ug/L Final 02/12/2020  3:41 PM M [facility]     Exam Date Exam Time Accession # Results    2/12/20  3:37 PM [id number]    Specimen Information: Midstream Urine        Component Value Flag Ref Range Units Status Collected Lab   Color Urine Yellow     Final 02/12/2020  3:37 PM [facility]   Appearance Urine Clear     Final 02/12/2020  3:37 PM [facility]   Glucose Urine Negative   NEG^Negative mg/dL Final 02/12/2020  3:37 PM [facility]   Bilirubin Urine Negative   NEG^Negative  Final 02/12/2020  3:37 PM [facility]   Ketones Urine Negative   NEG^Negative mg/dL Final 02/12/2020  3:37 PM [facility]   Specific Gravity Urine 1.035   1.003 - 1.035  Final 02/12/2020  3:37 PM [facility]   Blood Urine Negative   NEG^Negative  Final 02/12/2020  3:37 PM [facility]   pH Urine 6.0   4.7 - 8.0 pH Final 02/12/2020  3:37 PM [facility]   Protein Albumin Urine 10  Abnormal   NEG^Negative mg/dL Final 02/12/2020  3:37 PM [facility]   Urobilinogen mg/dL 2.0   0.0 - 2.0 mg/dL Final 02/12/2020  3:37 PM [facility]   Nitrite Urine Negative   NEG^Negative  Final 02/12/2020  3:37 PM M  [facility]   Leukocyte Esterase Urine Negative   NEG^Negative  Final 02/12/2020  3:37 PM M [facility]   Source     Final 02/12/2020  3:37    Midstream Urine    RBC Urine 1   0 - 2 /HPF Final 02/12/2020  3:37 PM M [facility]   WBC Urine 1   0 - 5 /HPF Final 02/12/2020  3:37 PM M [facility]   Bacteria Urine Few  Abnormal   NEG^Negative /HPF Final 02/12/2020  3:37 PM M [facility]   Mucous Urine Present  Abnormal   NEG^Negative /LPF Final 02/12/2020  3:37 PM M [facility] [place]   Postvoid residual 0    Impression: Urinary frequency    Plan   Plan: Given the irritative bladder symptoms we will get a urine cytology.  I talked with Dougie and recommendation at this time is conservative treatment with cutting back on the coffee and he is fully aware that beer cause a significant problem so cutting back on that as well would be helpful.  I am hesitant to try any anticholinergics especially given the fact that he is being tested for Crohn's and that can cause significant bowel constipation.  Follow-up in 3 months and if the patient wants to consider medication at that point we will discuss it.      Return in about 3 months (around 5/13/2020).    Denise Allen MD  [facility] [place]

## 2020-02-13 NOTE — LETTER
2/13/2020       RE: Jese Joe  [address] [place] [id number]     Dear Colleague,    Thank you for referring your patient, Jese Joe, to the [facility] [place] at [facility]. Please see a copy of my visit note below.      History     Chief Complaint:    Urinary Frequency (NPT)      HPI   Jese Joe is a 45 year old male who presents with a history of urinary frequency.  Dougie is 45 and he states that he has had urinary frequency likely all of his life.  He gets up 1-2 times a night and voids about every 2 hours during the day.  He denies any incontinence and he is not had any blood in the urine or urinary tract infections.  He has not had any treatment for this frequency.  He does drink about 4 cups of coffee a day which increases his frequency and beer really causes a significant increase in his urine frequency.  He does not remember being a bedwetter but he did have one brother that was a significant bedwetter.  He is currently states his bowel movements are normal but is being tested for possible Crohn's disease so sometimes does have some irritable bowel.    Allergies:    Allergies   Allergen Reactions     Cleocin Hives        Medications:      Acetaminophen (TYLENOL PO)  albuterol (PROAIR HFA/PROVENTIL HFA/VENTOLIN HFA) 108 (90 Base) MCG/ACT inhaler  ALPRAZolam (XANAX) 0.5 MG tablet  cetirizine (ZYRTEC) 10 MG tablet  fluticasone (FLONASE) 50 MCG/ACT nasal spray  HYDROcodone-acetaminophen (NORCO) 5-325 MG tablet  ondansetron (ZOFRAN-ODT) 4 MG ODT tab  traZODone (DESYREL) 50 MG tablet  valACYclovir (VALTREX) 1000 mg tablet  budesonide (ENTOCORT EC) 3 MG EC capsule  guaiFENesin-codeine (ROBITUSSIN AC) 100-10 MG/5ML solution        Problem List:      Patient Active Problem List    Diagnosis Date Noted     Chronic, continuous use of opioids 09/23/2019     Priority: Medium     SBO (small bowel obstruction) (H) 03/26/2019     Priority: Medium      ACP (advance care planning) 07/31/2017     Priority: Medium     Advance Care Planning 7/31/2017: ACP Review of Chart / Resources Provided:  Reviewed chart for advance care plan.  Jese Joe has no plan or code status on file. Discussed available resources and provided with information.   Added by Indira Santoro                  Past Medical History:      Past Medical History:   Diagnosis Date     History of steroid therapy      Stomach problems        Past Surgical History:      Past Surgical History:   Procedure Laterality Date     COLONOSCOPY N/A 6/18/2019    Procedure: COLONOSCOPY, WITH POLYPECTOMY AND BIOPSY;  Surgeon: Urszula Vides MD;  Location: [facility]     ESOPHAGOSCOPY, GASTROSCOPY, DUODENOSCOPY (EGD), COMBINED N/A 6/18/2019    Procedure: ESOPHAGOGASTRODUODENOSCOPY, WITH BIOPSY;  Surgeon: Urszula Vides MD;  Location: [facility] OR     GENITOURINARY SURGERY       NO SURGICAL HISTORY[         Family History:      Family History   Problem Relation Age of Onset     Other - See Comments Mother      Diabetes Maternal Grandfather      Heart Disease Paternal Grandfather         MI     Diabetes Other      Colon Cancer No family hx of        Social History:  Dougie is currently going through a divorce.    Marital Status:   [2]  Social History     Tobacco Use     Smoking status: Light Tobacco Smoker     Packs/day: 0.25     Years: 33.00     Pack years: 8.25     Types: Cigarettes     Start date: 1/1/1987     Smokeless tobacco: Never Used   Substance Use Topics     Alcohol use: Yes     Comment: occasionally     Drug use: No        Review of Systems   All other systems reviewed and are negative.        Physical Exam   Vitals:  /70   Pulse 70   Temp 97.1  F (36.2  C) (Tympanic)   Wt 81.6 kg (180 lb)   SpO2 98%   BMI 24.41 kg/m         Physical Exam  Constitutional:       Appearance: Normal appearance. He is normal weight.   Abdominal:      General: Abdomen is flat. There is no distension.       Palpations: Abdomen is soft. There is no mass.      Tenderness: There is no abdominal tenderness.      Hernia: No hernia is present. There is no hernia in the right inguinal area or left inguinal area.   Genitourinary:     Penis: Normal and circumcised.       Scrotum/Testes: Normal.         Right: Mass, tenderness or swelling not present.         Left: Mass, tenderness or swelling not present.      Epididymis:      Right: Normal.      Left: Normal.      Prostate: Normal.      Rectum: Normal.      Comments: Sternal rectal area is normal normal rectal tone prostates about 15 g and feels smooth.  Neurological:      Mental Status: He is alert.       Exam Date Exam Time Accession # Results    2/12/20  3:41 PM [id number]    Component Value Flag Ref Range Units Status Collected Lab   PSA 1.10   0 - 4 ug/L Final 02/12/2020  3:41 PM M [facility]     Exam Date Exam Time Accession # Results    2/12/20  3:37 PM [id number]    Specimen Information: Midstream Urine        Component Value Flag Ref Range Units Status Collected Lab   Color Urine Yellow     Final 02/12/2020  3:37 PM M [facility]   Appearance Urine Clear     Final 02/12/2020  3:37 PM M [facility]   Glucose Urine Negative   NEG^Negative mg/dL Final 02/12/2020  3:37 PM M [facility]   Bilirubin Urine Negative   NEG^Negative  Final 02/12/2020  3:37 PM M [facility]   Ketones Urine Negative   NEG^Negative mg/dL Final 02/12/2020  3:37 PM M [facility]   Specific Gravity Urine 1.035   1.003 - 1.035  Final 02/12/2020  3:37 PM M [facility]   Blood Urine Negative   NEG^Negative  Final 02/12/2020  3:37 PM M [facility]   pH Urine 6.0   4.7 - 8.0 pH Final 02/12/2020  3:37 PM M [facility]   Protein Albumin  Urine 10  Abnormal   NEG^Negative mg/dL Final 02/12/2020  3:37 PM M [facility]   Urobilinogen mg/dL 2.0   0.0 - 2.0 mg/dL Final 02/12/2020  3:37 PM M [facility]   Nitrite Urine Negative   NEG^Negative  Final 02/12/2020  3:37 PM M [facility]   Leukocyte Esterase Urine Negative   NEG^Negative  Final 02/12/2020  3:37 PM M [facility]   Source     Final 02/12/2020  3:37    Midstream Urine    RBC Urine 1   0 - 2 /HPF Final 02/12/2020  3:37 PM M [facility]   WBC Urine 1   0 - 5 /HPF Final 02/12/2020  3:37 PM M [facility]   Bacteria Urine Few  Abnormal   NEG^Negative /HPF Final 02/12/2020  3:37 PM M [facility]   Mucous Urine Present  Abnormal   NEG^Negative /LPF Final 02/12/2020  3:37 PM M [facility] [place]   Postvoid residual 0    Impression: Urinary frequency    Plan   Plan: Given the irritative bladder symptoms we will get a urine cytology.  I talked with Dougie and recommendation at this time is conservative treatment with cutting back on the coffee and he is fully aware that beer cause a significant problem so cutting back on that as well would be helpful.  I am hesitant to try any anticholinergics especially given the fact that he is being tested for Crohn's and that can cause significant bowel constipation.  Follow-up in 3 months and if the patient wants to consider medication at that point we will discuss it.      Return in about 3 months (around 5/13/2020).    Denise Allen MD  [facility] [place]            Again, thank you for allowing me to participate in the care of your patient.      Sincerely,    Denise Allen MD

## 2020-02-13 NOTE — NURSING NOTE
Chief Complaint   Patient presents with     Urinary Frequency     NPT       Initial /70   Pulse 70   Temp 97.1  F (36.2  C) (Tympanic)   Wt 81.6 kg (180 lb)   SpO2 98%   BMI 24.41 kg/m   Estimated body mass index is 24.41 kg/m  as calculated from the following:    Height as of 6/18/19: 1.829 m (6').    Weight as of this encounter: 81.6 kg (180 lb).  Medication Reconciliation: complete  Barbara Clifton LPN

## 2020-02-28 DIAGNOSIS — M25.512 LEFT SHOULDER PAIN, UNSPECIFIED CHRONICITY: Primary | ICD-10-CM

## 2020-03-02 ENCOUNTER — TELEPHONE (OUTPATIENT)
Dept: GASTROENTEROLOGY | Facility: CLINIC | Age: 45
End: 2020-03-02

## 2020-03-02 ENCOUNTER — HEALTH MAINTENANCE LETTER (OUTPATIENT)
Age: 45
End: 2020-03-02

## 2020-03-02 DIAGNOSIS — K56.609 SBO (SMALL BOWEL OBSTRUCTION) (H): Primary | ICD-10-CM

## 2020-03-02 NOTE — TELEPHONE ENCOUNTER
Patient Name: Jese Joe   : 1975  MRN: [id number]       : N/A    MyChart Active    VM full, Aleksandra msg sent with information needed to complete pre-assessment call.  Request pt contact Endoscopy Pre-assessment RN to complete upcoming procedure information.  Telephone call-back number provided.    [x] Resent via Mediasmart - This includes: Split-dose Golytely  instructions, ASC MAC Instructions, procedure date/time/location/provider.        Tamra Slater, RN  [facility]    Additional Information regarding appointment:  _____________________________________________________      Patient scheduled for:  Colonoscopy    Indication for procedure. SBO (small bowel obstruction)    Sedation Type: MAC    Procedure Provider:  [place]      Referring Provider. Andrei Philip    Arrival time verified: Mon / 3.9.2020    Facility location verified:   [facility] [place] [address]      History & Physical: N/A      __________________________________________________      Prep Type:   [x]Golytely Pharmacy : (not sent)      Patient taking any blood thinners ? No      Electronic implanted medical devices ? No      GI / Hepatology History: Yes: Hx SBO      Heart disease ? No      Lung disease ? No      Sleep apnea ? No      Diabetic ? No      Kidney disease ? No      Instructions given: [] Rec'd & Read   [] Reviewed            Pre procedure teaching completed: [] Yes - Reviewed      IV Sedation: [] No questions regarding Sedation as ordered       : Transportation from procedure & responsible adult to be with patient following procedure for a minimum of Overnight (MAC): [] ** - confirmed will have post-procedure companionship as required      Pt completed assessment via:  [] Return Mediasmart query responses   [] Return/Follow-up telephone call      _______________________________________________    Tamra Slater,  RN  [facility]

## 2020-03-04 ENCOUNTER — OFFICE VISIT (OUTPATIENT)
Dept: ORTHOPEDICS | Facility: CLINIC | Age: 45
End: 2020-03-04
Payer: COMMERCIAL

## 2020-03-04 ENCOUNTER — ANCILLARY PROCEDURE (OUTPATIENT)
Dept: GENERAL RADIOLOGY | Facility: CLINIC | Age: 45
End: 2020-03-04
Attending: ORTHOPAEDIC SURGERY
Payer: COMMERCIAL

## 2020-03-04 ENCOUNTER — OFFICE VISIT (OUTPATIENT)
Dept: ORTHOPEDICS | Facility: CLINIC | Age: 45
End: 2020-03-04
Attending: FAMILY MEDICINE
Payer: COMMERCIAL

## 2020-03-04 ENCOUNTER — PRE VISIT (OUTPATIENT)
Dept: ORTHOPEDICS | Facility: CLINIC | Age: 45
End: 2020-03-04

## 2020-03-04 VITALS — HEIGHT: 71 IN | RESPIRATION RATE: 16 BRPM | BODY MASS INDEX: 25.06 KG/M2 | WEIGHT: 179 LBS

## 2020-03-04 VITALS — BODY MASS INDEX: 25.19 KG/M2 | WEIGHT: 179.9 LBS | HEIGHT: 71 IN

## 2020-03-04 DIAGNOSIS — M75.02 ADHESIVE CAPSULITIS OF LEFT SHOULDER: Primary | ICD-10-CM

## 2020-03-04 DIAGNOSIS — M71.9 DISORDER OF BURSAE AND TENDONS IN SHOULDER REGION: Primary | ICD-10-CM

## 2020-03-04 DIAGNOSIS — M67.919 DISORDER OF BURSAE AND TENDONS IN SHOULDER REGION: Primary | ICD-10-CM

## 2020-03-04 DIAGNOSIS — M25.512 LEFT SHOULDER PAIN, UNSPECIFIED CHRONICITY: ICD-10-CM

## 2020-03-04 RX ORDER — METHYLPREDNISOLONE ACETATE 80 MG/ML
80 INJECTION, SUSPENSION INTRA-ARTICULAR; INTRALESIONAL; INTRAMUSCULAR; SOFT TISSUE
Status: DISCONTINUED | OUTPATIENT
Start: 2020-03-04 | End: 2020-07-22

## 2020-03-04 RX ORDER — BISACODYL 5 MG/1
10 TABLET, DELAYED RELEASE ORAL DAILY
Qty: 4 TABLET | Refills: 0 | Status: SHIPPED | OUTPATIENT
Start: 2020-03-04 | End: 2020-07-22

## 2020-03-04 RX ADMIN — METHYLPREDNISOLONE ACETATE 80 MG: 80 INJECTION, SUSPENSION INTRA-ARTICULAR; INTRALESIONAL; INTRAMUSCULAR; SOFT TISSUE at 15:22

## 2020-03-04 ASSESSMENT — ENCOUNTER SYMPTOMS
SINUS PAIN: 0
BLOOD IN STOOL: 0
MUSCLE CRAMPS: 0
SINUS CONGESTION: 0
JOINT SWELLING: 0
NAUSEA: 1
DYSURIA: 0
MYALGIAS: 0
TASTE DISTURBANCE: 0
SORE THROAT: 0
JAUNDICE: 0
DIARRHEA: 1
BOWEL INCONTINENCE: 0
HOARSE VOICE: 0
STIFFNESS: 0
VOMITING: 0
BLOATING: 0
HEARTBURN: 1
SMELL DISTURBANCE: 0
BACK PAIN: 1
NECK PAIN: 1
ABDOMINAL PAIN: 1
NECK MASS: 0
MUSCLE WEAKNESS: 0
DIFFICULTY URINATING: 0
FLANK PAIN: 0
ARTHRALGIAS: 1
CONSTIPATION: 0
RECTAL PAIN: 0
TROUBLE SWALLOWING: 0
HEMATURIA: 0

## 2020-03-04 ASSESSMENT — MIFFLIN-ST. JEOR
SCORE: 1709.07
SCORE: 1713.13

## 2020-03-04 ASSESSMENT — PATIENT HEALTH QUESTIONNAIRE - PHQ9
SUM OF ALL RESPONSES TO PHQ QUESTIONS 1-9: 5
10. IF YOU CHECKED OFF ANY PROBLEMS, HOW DIFFICULT HAVE THESE PROBLEMS MADE IT FOR YOU TO DO YOUR WORK, TAKE CARE OF THINGS AT HOME, OR GET ALONG WITH OTHER PEOPLE: EXTREMELY DIFFICULT
SUM OF ALL RESPONSES TO PHQ QUESTIONS 1-9: 5

## 2020-03-04 NOTE — PROGRESS NOTES
PROCEDURE ENCOUNTER    [facility]  Orthopedics  Nader Duron DO  2020     Name: Jese Joe  MRN: [id number]  Age: 45 year old  : 1975    Referring provider: Dr. Monique Aguilar  Diagnosis: Adhesive Capsulitis of left shoulder, chronic left shoulder pain.    Glenohumeral Injection - Ultrasound Guided  The patient was informed of the risks and the benefits of the procedure and a written consent was signed.  The patient s left shoulder was prepped with chlorhexidine in sterile fashion.   80 mg of methylprednisolone suspension was drawn up into a 5 mL syringe with 7 mL of 1% lidocaine w/o Epi.  3cc of 1% lidocaine without epinephrine used for superficial anesthesia at posterolateral shoulder.  Injection was performed using sterile technique.  Under ultrasound guidance a 3.5-inch 22-gauge needle was used to enter the left glenohumeral joint.  Posterior approach was used with the patient in lateral recumbent position, arm in neutral position at the side.  Needle placement was visualized and documented with ultrasound.  Ultrasound visualization was necessary due to the small joint space entered.  Injection performed long axis to the probe.  Injection solution visualized within the joint space.  Images were permanently stored for the patient's record.  There were no complications. The patient tolerated the procedure well. There was negligible bleeding.   Therapy scheduled to follow for mobilization.  The patient was instructed to call or go to the emergency room with any unusual pain, swelling, redness, or if otherwise concerned.  Nader Duron DO CAM  Primary Care Sports Medicine  [facility]    Large Joint Injection/Arthocentesis: L glenohumeral joint  Date/Time: 3/4/2020 3:22 PM  Performed by: Nader Duron DO  Authorized by: Nader Duron DO     Indications:  Pain  Needle Size:  22 G  Guidance: ultrasound    Approach:  Posterolateral  Location:  Shoulder      Site:   L glenohumeral joint  Medications:  80 mg methylPREDNISolone 80 MG/ML  Medications comment:  Xylocaine 1% 200mg per 20ml   NDC 64105-997-07  LOT [id number]  EXP 6/1/23  Outcome:  Tolerated well, no immediate complications  Procedure discussed: discussed risks, benefits, and alternatives    Consent Given by:  Patient  Timeout: timeout called immediately prior to procedure    Prep: patient was prepped and draped in usual sterile fashion

## 2020-03-04 NOTE — LETTER
"3/4/2020       RE: Jese Joe  [address] [facility] [address] [place] [address]     Dear Colleague,    Thank you for referring your patient, Jese Joe, to the [facility] ORTHOPAEDIC [facility]IC at [facility]. Please see a copy of my visit note below.    CHIEF CONCERN:  Chronic left shoulder pain     HISTORY OF PRESENT ILLNESS:  Jese Joe is a 45 year old male presenting today for evaluation of chronic left shoulder pain. This pain has been going on for about a year without any inciting event or trauma. He describes constant diffuse pain, though with a \"spike\" of sharp pain that radiates from the posterior to the anterior shoulder. He initially noticed the pain while sleeping, and it has been gradually worsening with time with associated stiffness, radiation of pain down the arm to the elbow, and tingling down into the 4th and 5th fingers. Pain is worse with any use of the shoulder and wakes him from sleep. Improved with ice or Vicodin. He received a subacromial corticosteroid injection in November into the shoulder which relieved the pain for about three weeks. He also tried physical therapy a few months ago for stretching exercises, which did not help. Hiedwin states his biggest concern is the pain. Patient has no prior history of surgeries or dislocations of the left shoulder. No weakness. He saw his primary care provider, who took him off of work starting 2/5/2020. He does have chronic right shoulder pain from a hockey injury sustained as a child, but this pain is tolerable. Patient works as an ER nurse.     MR Shoulder Left without Contrast (10/10/2019):  1.   Edema at the AC joint, likely reactive related to mild to  moderate degenerative change. No evidence of fracture. These changes  may also be related to a repetitive injury.  2.  Moderate to severe tendinosis involving the anterior fibers of the  supraspinatous and distal fibers of the subscapularis.  3.  Localized " fraying suggested along the superior and posterior  aspect of the labrum at 12:00 and 9:00.  Per radiology.     PAST MEDICAL HISTORY:  Past Medical History:   Diagnosis Date     History of steroid therapy      Stomach problems         PAST SURGICAL HISTORY:  Colonoscopy with EGD - 6/18/2019   surgery     MEDICATIONS:     Acetaminophen (TYLENOL PO), Take 1,000 mg by mouth every 6 hours as needed , Disp: , Rfl:      albuterol (PROAIR HFA/PROVENTIL HFA/VENTOLIN HFA) 108 (90 Base) MCG/ACT inhaler, Inhale 2 puffs into the lungs every 4 hours as needed for shortness of breath / dyspnea or wheezing, Disp: 1 Inhaler, Rfl: 3     ALPRAZolam (XANAX) 0.5 MG tablet, Take 1 tablet (0.5 mg) by mouth 2 times daily as needed for anxiety, Disp: 20 tablet, Rfl: 0     budesonide (ENTOCORT EC) 3 MG EC capsule, Take 2 capsules (6 mg) by mouth every morning (Patient not taking: Reported on 2/13/2020), Disp: 60 capsule, Rfl: 1     cetirizine (ZYRTEC) 10 MG tablet, Take 10 mg by mouth daily, Disp: , Rfl:      fluticasone (FLONASE) 50 MCG/ACT nasal spray, Spray 1-2 sprays into both nostrils daily, Disp: 15.8 g, Rfl: 3     guaiFENesin-codeine (ROBITUSSIN AC) 100-10 MG/5ML solution, Take 5-10 mLs by mouth every 4 hours as needed for cough (Patient not taking: Reported on 2/13/2020), Disp: 180 mL, Rfl: 0     HYDROcodone-acetaminophen (NORCO) 5-325 MG tablet, 1 po bid prn., Disp: 40 tablet, Rfl: 0     ondansetron (ZOFRAN-ODT) 4 MG ODT tab, DISSOLVE 1 TABLET IN MOUTH EVERY 6 HOURS AS NEEDED FOR NAUSEA, Disp: 20 tablet, Rfl: 0     traZODone (DESYREL) 50 MG tablet, Take 1 tablet (50 mg) by mouth At Bedtime, Disp: 30 tablet, Rfl: 1     valACYclovir (VALTREX) 1000 mg tablet, TAKE 1 TABLET BY MOUTH THREE TIMES DAILY FOR 7 DAYS, Disp: 21 tablet, Rfl: 0     ALLERGIES:  Cleocin     SOCIAL HISTORY:    Patient is a light tobacco smoker (0.25 ppd for 33 years). No smokeless tobacco use.   Alcohol use: yes  Drug use; no     FAMILY HISTORY: Reviewed in  EMR      REVIEW OF SYSTEMS: Positive for that noted in past medical history and history of present illness and otherwise reviewed in EMR     PHYSICAL EXAM:    Adult male in no acute distress. Articulates and communicates with normal affect.  Respirations even and unlabored  Focused upper extremity exam:  Skin intact. No erythema or asymmetry. Motor intact EPL,FPL,Intrinsics. Right shoulder active motion is FE to 175, ER at side to 70 deg, and IR to T10. Left shoulder active=passive motion is FE to 130 deg, ER to 65 deg, and IR to T12. Positive Speed and Kendall's test on the left. Some tenderness over left AC joint.     IMAGING:  X-rays of the shoulder obtained today demonstrate no evidence of fracture or dislocation.      Review of the left shoulder MRI performed 10/10/2019 shows no evidence of SLAP tear, no evidence of rotator cuff tears. Edema within the rotator cuff tendon and AC joint. Fluid around the biceps tendon.      ASSESSMENT:    1. Left shoulder pain and limited motion in forward elevation    PLAN:  1. Glenohumeral cortisone injection.   2. Physical therapy for improving ROM.   3. He will be given a note to return to work with restrictions.     Scribe Disclosure:  I, Luiz Burciaga, am serving as a scribe to document services personally performed by Monique Aguilar MD at this visit, based upon the provider's statements to me. All documentation has been reviewed by the aforementioned provider prior to being entered into the official medical record.      Again, thank you for allowing me to participate in the care of your patient.      Sincerely,    Monique Aguilar MD

## 2020-03-04 NOTE — LETTER
3/4/2020       RE: Jese Joe  [address] [facility] [address] [place] [address]     Dear Colleague,    Thank you for referring your patient, Jese Joe, to the [facility] WALK [facility] CLINIC at [facility]. Please see a copy of my visit note below.    PROCEDURE ENCOUNTER    [facility]  Orthopedics  Nader Duron DO  2020     Name: Jese Joe  MRN: [id number]  Age: 45 year old  : 1975    Referring provider: Dr. Monique Aguilar  Diagnosis: Adhesive Capsulitis of left shoulder, chronic left shoulder pain.    Glenohumeral Injection - Ultrasound Guided  The patient was informed of the risks and the benefits of the procedure and a written consent was signed.  The patient s left shoulder was prepped with chlorhexidine in sterile fashion.   80 mg of methylprednisolone suspension was drawn up into a 5 mL syringe with 7 mL of 1% lidocaine w/o Epi.  3cc of 1% lidocaine without epinephrine used for superficial anesthesia at posterolateral shoulder.  Injection was performed using sterile technique.  Under ultrasound guidance a 3.5-inch 22-gauge needle was used to enter the left glenohumeral joint.  Posterior approach was used with the patient in lateral recumbent position, arm in neutral position at the side.  Needle placement was visualized and documented with ultrasound.  Ultrasound visualization was necessary due to the small joint space entered.  Injection performed long axis to the probe.  Injection solution visualized within the joint space.  Images were permanently stored for the patient's record.  There were no complications. The patient tolerated the procedure well. There was negligible bleeding.   Therapy scheduled to follow for mobilization.  The patient was instructed to call or go to the emergency room with any unusual pain, swelling, redness, or if otherwise concerned.  Nader Duron DO [facility]  [place]  [facility]    Large Joint Injection/Arthocentesis: L glenohumeral joint  Date/Time: 3/4/2020 3:22 PM  Performed by: Nader Duron DO  Authorized by: Nader Duron DO     Indications:  Pain  Needle Size:  22 G  Guidance: ultrasound    Approach:  Posterolateral  Location:  Shoulder      Site:  L glenohumeral joint  Medications:  80 mg methylPREDNISolone 80 MG/ML  Medications comment:  Xylocaine 1% 200mg per 20ml   NDC 21133-655-13  LOT [id number]  EXP 6/1/23  Outcome:  Tolerated well, no immediate complications  Procedure discussed: discussed risks, benefits, and alternatives    Consent Given by:  Patient  Timeout: timeout called immediately prior to procedure    Prep: patient was prepped and draped in usual sterile fashion      Nader Duron DO

## 2020-03-04 NOTE — PROGRESS NOTES
"CHIEF CONCERN:  Chronic left shoulder pain     HISTORY OF PRESENT ILLNESS:  Jese Joe is a 45 year old male presenting today for evaluation of chronic left shoulder pain. This pain has been going on for about a year without any inciting event or trauma. He describes constant diffuse pain, though with a \"spike\" of sharp pain that radiates from the posterior to the anterior shoulder. He initially noticed the pain while sleeping, and it has been gradually worsening with time with associated stiffness, radiation of pain down the arm to the elbow, and tingling down into the 4th and 5th fingers. Pain is worse with any use of the shoulder and wakes him from sleep. Improved with ice or Vicodin. He received a subacromial corticosteroid injection in November into the shoulder which relieved the pain for about three weeks. He also tried physical therapy a few months ago for stretching exercises, which did not help. Joanne states his biggest concern is the pain. Patient has no prior history of surgeries or dislocations of the left shoulder. No weakness. He saw his primary care provider, who took him off of work starting 2/5/2020. He does have chronic right shoulder pain from a hockey injury sustained as a child, but this pain is tolerable. Patient works as an ER nurse.     MR Shoulder Left without Contrast (10/10/2019):  1.   Edema at the AC joint, likely reactive related to mild to  moderate degenerative change. No evidence of fracture. These changes  may also be related to a repetitive injury.  2.  Moderate to severe tendinosis involving the anterior fibers of the  supraspinatous and distal fibers of the subscapularis.  3.  Localized fraying suggested along the superior and posterior  aspect of the labrum at 12:00 and 9:00.  Per radiology.     PAST MEDICAL HISTORY:  Past Medical History:   Diagnosis Date     History of steroid therapy      Stomach problems         PAST SURGICAL HISTORY:  Colonoscopy with EGD - " 6/18/2019   surgery     MEDICATIONS:     Acetaminophen (TYLENOL PO), Take 1,000 mg by mouth every 6 hours as needed , Disp: , Rfl:      albuterol (PROAIR HFA/PROVENTIL HFA/VENTOLIN HFA) 108 (90 Base) MCG/ACT inhaler, Inhale 2 puffs into the lungs every 4 hours as needed for shortness of breath / dyspnea or wheezing, Disp: 1 Inhaler, Rfl: 3     ALPRAZolam (XANAX) 0.5 MG tablet, Take 1 tablet (0.5 mg) by mouth 2 times daily as needed for anxiety, Disp: 20 tablet, Rfl: 0     budesonide (ENTOCORT EC) 3 MG EC capsule, Take 2 capsules (6 mg) by mouth every morning (Patient not taking: Reported on 2/13/2020), Disp: 60 capsule, Rfl: 1     cetirizine (ZYRTEC) 10 MG tablet, Take 10 mg by mouth daily, Disp: , Rfl:      fluticasone (FLONASE) 50 MCG/ACT nasal spray, Spray 1-2 sprays into both nostrils daily, Disp: 15.8 g, Rfl: 3     guaiFENesin-codeine (ROBITUSSIN AC) 100-10 MG/5ML solution, Take 5-10 mLs by mouth every 4 hours as needed for cough (Patient not taking: Reported on 2/13/2020), Disp: 180 mL, Rfl: 0     HYDROcodone-acetaminophen (NORCO) 5-325 MG tablet, 1 po bid prn., Disp: 40 tablet, Rfl: 0     ondansetron (ZOFRAN-ODT) 4 MG ODT tab, DISSOLVE 1 TABLET IN MOUTH EVERY 6 HOURS AS NEEDED FOR NAUSEA, Disp: 20 tablet, Rfl: 0     traZODone (DESYREL) 50 MG tablet, Take 1 tablet (50 mg) by mouth At Bedtime, Disp: 30 tablet, Rfl: 1     valACYclovir (VALTREX) 1000 mg tablet, TAKE 1 TABLET BY MOUTH THREE TIMES DAILY FOR 7 DAYS, Disp: 21 tablet, Rfl: 0     ALLERGIES:  Cleocin     SOCIAL HISTORY:    Patient is a light tobacco smoker (0.25 ppd for 33 years). No smokeless tobacco use.   Alcohol use: yes  Drug use; no     FAMILY HISTORY: Reviewed in EMR      REVIEW OF SYSTEMS: Positive for that noted in past medical history and history of present illness and otherwise reviewed in EMR     PHYSICAL EXAM:    Adult male in no acute distress. Articulates and communicates with normal affect.  Respirations even and unlabored  Focused  upper extremity exam:  Skin intact. No erythema or asymmetry. Motor intact EPL,FPL,Intrinsics. Right shoulder active motion is FE to 175, ER at side to 70 deg, and IR to T10. Left shoulder active=passive motion is FE to 130 deg, ER to 65 deg, and IR to T12. Positive Speed and Calumet's test on the left. Some tenderness over left AC joint.     IMAGING:  X-rays of the shoulder obtained today demonstrate no evidence of fracture or dislocation.      Review of the left shoulder MRI performed 10/10/2019 shows no evidence of SLAP tear, no evidence of rotator cuff tears. Edema within the rotator cuff tendon and AC joint. Fluid around the biceps tendon.      ASSESSMENT:    1. Left shoulder pain and limited motion in forward elevation    PLAN:  1. Glenohumeral cortisone injection.   2. Physical therapy for improving ROM.   3. He will be given a note to return to work with restrictions.     Scribe Disclosure:  I, Luiz Burciaga, am serving as a scribe to document services personally performed by Monique Aguilar MD at this visit, based upon the provider's statements to me. All documentation has been reviewed by the aforementioned provider prior to being entered into the official medical record.

## 2020-03-04 NOTE — NURSING NOTE
[address] [address]  Dept: [phone]  ______________________________________________________________________________    Patient: Jese Joe   : 1975   MRN: [id number]   2020    INVASIVE PROCEDURE SAFETY CHECKLIST    Date: 2020   Procedure:Left shoulder Gh CSI   Patient Name: Jese Joe  MRN: [id number]  YOB: 1975    Action: Complete sections as appropriate. Any discrepancy results in a HARD COPY until resolved.     PRE PROCEDURE:  Patient ID verified with 2 identifiers (name and  or MRN): Yes  Procedure and site verified with patient/designee (when able): Yes  Accurate consent documentation in medical record: Yes  H&P (or appropriate assessment) documented in medical record: Yes  H&P must be up to 20 days prior to procedure and updates within 24 hours of procedure as applicable: NA  Relevant diagnostic and radiology test results appropriately labeled and displayed as applicable: Yes  Procedure site(s) marked with provider initials: NA    TIMEOUT:  Time-Out performed immediately prior to starting procedure, including verbal and active participation of all team members addressing the following:Yes  * Correct patient identify  * Confirmed that the correct side and site are marked  * An accurate procedure consent form  * Agreement on the procedure to be done  * Correct patient position  * Relevant images and results are properly labeled and appropriately displayed  * The need to administer antibiotics or fluids for irrigation purposes during the procedure as applicable   * Safety precautions based on patient history or medication use    DURING PROCEDURE: Verification of correct person, site, and procedures any time the responsibility for care of the patient is transferred to another member of the care team.       Prior to injection, verified patient identity using patient's name and date of  birth.  Due to injection administration, patient instructed to remain in clinic for 15 minutes  afterwards, and to report any adverse reaction to me immediately.    Joint injection was performed.      Drug Amount Wasted:  Yes: 10 mg/ml   Vial/Syringe: Single dose vial  Expiration Date:  6/23      Luz Bird, ATC  March 4, 2020

## 2020-03-04 NOTE — PROGRESS NOTES
CHIEF CONCERN:  Chronic left shoulder pain     HISTORY OF PRESENT ILLNESS:  Jese Joe is a 45 year old male presenting today for evaluation of chronic left shoulder pain.  ***     MR Shoulder Left without Contrast (10/10/2019):  1.   Edema at the AC joint, likely reactive related to mild to  moderate degenerative change. No evidence of fracture. These changes  may also be related to a repetitive injury.  2.  Moderate to severe tendinosis involving the anterior fibers of the  supraspinatous and distal fibers of the subscapularis.  3.  Localized fraying suggested along the superior and posterior  aspect of the labrum at 12:00 and 9:00.  Per radiology.     PAST MEDICAL HISTORY:  Past Medical History:   Diagnosis Date     History of steroid therapy      Stomach problems         PAST SURGICAL HISTORY:  Colonoscopy with EGD - 6/18/2019   surgery     MEDICATIONS:     Acetaminophen (TYLENOL PO), Take 1,000 mg by mouth every 6 hours as needed , Disp: , Rfl:      albuterol (PROAIR HFA/PROVENTIL HFA/VENTOLIN HFA) 108 (90 Base) MCG/ACT inhaler, Inhale 2 puffs into the lungs every 4 hours as needed for shortness of breath / dyspnea or wheezing, Disp: 1 Inhaler, Rfl: 3     ALPRAZolam (XANAX) 0.5 MG tablet, Take 1 tablet (0.5 mg) by mouth 2 times daily as needed for anxiety, Disp: 20 tablet, Rfl: 0     budesonide (ENTOCORT EC) 3 MG EC capsule, Take 2 capsules (6 mg) by mouth every morning (Patient not taking: Reported on 2/13/2020), Disp: 60 capsule, Rfl: 1     cetirizine (ZYRTEC) 10 MG tablet, Take 10 mg by mouth daily, Disp: , Rfl:      fluticasone (FLONASE) 50 MCG/ACT nasal spray, Spray 1-2 sprays into both nostrils daily, Disp: 15.8 g, Rfl: 3     guaiFENesin-codeine (ROBITUSSIN AC) 100-10 MG/5ML solution, Take 5-10 mLs by mouth every 4 hours as needed for cough (Patient not taking: Reported on 2/13/2020), Disp: 180 mL, Rfl: 0     HYDROcodone-acetaminophen (NORCO) 5-325 MG tablet, 1 po bid prn., Disp: 40 tablet, Rfl:  0     ondansetron (ZOFRAN-ODT) 4 MG ODT tab, DISSOLVE 1 TABLET IN MOUTH EVERY 6 HOURS AS NEEDED FOR NAUSEA, Disp: 20 tablet, Rfl: 0     traZODone (DESYREL) 50 MG tablet, Take 1 tablet (50 mg) by mouth At Bedtime, Disp: 30 tablet, Rfl: 1     valACYclovir (VALTREX) 1000 mg tablet, TAKE 1 TABLET BY MOUTH THREE TIMES DAILY FOR 7 DAYS, Disp: 21 tablet, Rfl: 0     ALLERGIES:  Cleocin     SOCIAL HISTORY:    Patient is a light tobacco smoker (0.25 ppd for 33 years). No smokeless tobacco use.   Alcohol use: yes  Drug use; no     FAMILY HISTORY: Reviewed in EMR      REVIEW OF SYSTEMS: Positive for that noted in past medical history and history of present illness and otherwise reviewed in EMR     PHYSICAL EXAM:    Adult male in no acute distress. Articulates and communicates with normal affect.  Respirations even and unlabored  Focused upper extremity exam:    Skin intact. No erythema. Sensation intact all dermatomes into the hand to light touch. EPL, FPL, and Intrinsics intact. Right shoulder active motion is FE to ***, ER at side to ***, and IR to ***. Left shoulder active motion is FE to ***, ER to ***, and IR to ***.  *** Neer and Coulter. *** pain on palpation over the AC joint. *** pain on palpation over the long head of the biceps.      IMAGING:  ***    ASSESSMENT:    ***    PLAN:  ***    Scribe Disclosure:  Luiz VASQUEZ, am serving as a scribe to document services personally performed by Monique Aguilar MD at this visit, based upon the provider's statements to me. All documentation has been reviewed by the aforementioned provider prior to being entered into the official medical record. ***    Luiz VASQUEZ, a scribe, prepared the chart for today's encounter.

## 2020-03-04 NOTE — NURSING NOTE
"Reason For Visit:   Chief Complaint   Patient presents with     Consult     Chronic left shoulder pain , Referred by Dr. Philip       PCP: Andrei Philip  Ref: Dr. Philip    ?  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  Work status?  Full time.  Date of injury: none/ chronic - fell on 12/24/2019    Date of surgery: NA  Type of surgery: NA.  Smoker: Yes  Request smoking cessation information: No    Right hand dominant    SANE score  Affected shoulder: Left  Right shoulder SANE: 90  Left shoulder SANE: 30    Ht 1.803 m (5' 11\")   Wt 81.6 kg (179 lb 14.3 oz)   BMI 25.09 kg/m        Pain Assessment  Patient Currently in Pain: Yes  0-10 Pain Scale: 4  Primary Pain Location: Shoulder(Left)  Pain Descriptors: Constant, Shooting  Alleviating Factors: Ice, Pain medication  Aggravating Factors: Movement    Concepción Jurado ATC        "

## 2020-03-04 NOTE — TELEPHONE ENCOUNTER
Patient Name: Jese Joe   : 1975  MRN: [id number]        : N/A    Aleksandra Active     Additional Information regarding appointment:  _____________________________________________________       Patient scheduled for:  Colonoscopy    Indication for procedure. SBO (small bowel obstruction)    Sedation Type: MAC    Procedure Provider:  Mahogany                            Referring Provider. Andrei Philip    Arrival time verified: Mon / 3.9.2020    Facility location verified:   [facility]  [address] [place] [address]       History & Physical: N/A    BMI 24.97   __________________________________________________       Prep Type:   [x]?[facility] : [facility] - [address]       Patient taking any blood thinners ? No       Electronic implanted medical devices ? No       GI / Hepatology History: Yes: Hx SBO       Heart disease ? No       Lung disease ? No       Sleep apnea ? No       Diabetic ? No       Kidney disease ? No       Instructions given: [x]? Rec'd & Read   [x]? Reviewed             Pre procedure teaching completed: [x]? Yes - Reviewed       IV Sedation: [x]? No questions regarding Sedation as ordered        : Transportation from procedure & responsible adult to be with patient following procedure for a minimum of Overnight (MAC): [x]? Enedina Ferreira - hank will have post-procedure companionship as required       Pt completed assessment via:  [x]? Return/Follow-up telephone call       _______________________________________________     Tamra Slater RN  [facility] Endoscopy

## 2020-03-05 ASSESSMENT — PATIENT HEALTH QUESTIONNAIRE - PHQ9: SUM OF ALL RESPONSES TO PHQ QUESTIONS 1-9: 5

## 2020-03-06 ENCOUNTER — TELEPHONE (OUTPATIENT)
Dept: ORTHOPEDICS | Facility: CLINIC | Age: 45
End: 2020-03-06

## 2020-03-06 NOTE — TELEPHONE ENCOUNTER
[facility] Call Center    Phone Message    May a detailed message be left on voicemail: yes     Reason for Call: Form or Letter   Type or form/letter needing completion: fmla forms  Provider: KERA CAMPBELL  Date form needed: ASAP  Once completed: Fax form to:  Pt says that instructions are on the forms. Pt left this with clinic on 03/04. Pt's employer still has not received the forms to authorize pt to go back to work. Please follow-up with status or questions.      Action Taken: Other: ortho    Travel Screening: Not Applicable

## 2020-03-09 ENCOUNTER — OFFICE VISIT (OUTPATIENT)
Dept: GASTROENTEROLOGY | Facility: CLINIC | Age: 45
End: 2020-03-09
Payer: COMMERCIAL

## 2020-03-09 ENCOUNTER — ANESTHESIA (OUTPATIENT)
Dept: SURGERY | Facility: AMBULATORY SURGERY CENTER | Age: 45
End: 2020-03-09

## 2020-03-09 ENCOUNTER — HOSPITAL ENCOUNTER (OUTPATIENT)
Facility: AMBULATORY SURGERY CENTER | Age: 45
End: 2020-03-09
Attending: INTERNAL MEDICINE
Payer: COMMERCIAL

## 2020-03-09 ENCOUNTER — ANESTHESIA EVENT (OUTPATIENT)
Dept: SURGERY | Facility: AMBULATORY SURGERY CENTER | Age: 45
End: 2020-03-09

## 2020-03-09 VITALS — HEART RATE: 81 BPM

## 2020-03-09 VITALS — HEIGHT: 72 IN | WEIGHT: 180 LBS | BODY MASS INDEX: 24.38 KG/M2

## 2020-03-09 VITALS
RESPIRATION RATE: 16 BRPM | HEIGHT: 72 IN | OXYGEN SATURATION: 98 % | SYSTOLIC BLOOD PRESSURE: 144 MMHG | BODY MASS INDEX: 24.38 KG/M2 | HEART RATE: 89 BPM | TEMPERATURE: 98.3 F | DIASTOLIC BLOOD PRESSURE: 97 MMHG | WEIGHT: 180 LBS

## 2020-03-09 DIAGNOSIS — Z71.3 NUTRITIONAL COUNSELING: ICD-10-CM

## 2020-03-09 DIAGNOSIS — K56.609 SBO (SMALL BOWEL OBSTRUCTION) (H): ICD-10-CM

## 2020-03-09 DIAGNOSIS — M25.512 LEFT SHOULDER PAIN, UNSPECIFIED CHRONICITY: Primary | ICD-10-CM

## 2020-03-09 DIAGNOSIS — K50.90 CROHN'S DISEASE (H): Primary | ICD-10-CM

## 2020-03-09 LAB — COLONOSCOPY: NORMAL

## 2020-03-09 RX ORDER — LIDOCAINE HYDROCHLORIDE 20 MG/ML
INJECTION, SOLUTION INFILTRATION; PERINEURAL PRN
Status: DISCONTINUED | OUTPATIENT
Start: 2020-03-09 | End: 2020-03-09

## 2020-03-09 RX ORDER — SODIUM CHLORIDE, SODIUM LACTATE, POTASSIUM CHLORIDE, CALCIUM CHLORIDE 600; 310; 30; 20 MG/100ML; MG/100ML; MG/100ML; MG/100ML
INJECTION, SOLUTION INTRAVENOUS CONTINUOUS PRN
Status: DISCONTINUED | OUTPATIENT
Start: 2020-03-09 | End: 2020-03-09

## 2020-03-09 RX ORDER — PROPOFOL 10 MG/ML
INJECTION, EMULSION INTRAVENOUS CONTINUOUS PRN
Status: DISCONTINUED | OUTPATIENT
Start: 2020-03-09 | End: 2020-03-09

## 2020-03-09 RX ORDER — ONDANSETRON 2 MG/ML
4 INJECTION INTRAMUSCULAR; INTRAVENOUS
Status: DISCONTINUED | OUTPATIENT
Start: 2020-03-09 | End: 2020-03-09 | Stop reason: HOSPADM

## 2020-03-09 RX ORDER — SIMETHICONE
LIQUID (ML) MISCELLANEOUS PRN
Status: DISCONTINUED | OUTPATIENT
Start: 2020-03-09 | End: 2020-03-09 | Stop reason: HOSPADM

## 2020-03-09 RX ORDER — FLUMAZENIL 0.1 MG/ML
0.2 INJECTION, SOLUTION INTRAVENOUS
Status: DISCONTINUED | OUTPATIENT
Start: 2020-03-09 | End: 2020-03-10 | Stop reason: HOSPADM

## 2020-03-09 RX ORDER — ONDANSETRON 2 MG/ML
INJECTION INTRAMUSCULAR; INTRAVENOUS PRN
Status: DISCONTINUED | OUTPATIENT
Start: 2020-03-09 | End: 2020-03-09

## 2020-03-09 RX ORDER — ONDANSETRON 2 MG/ML
4 INJECTION INTRAMUSCULAR; INTRAVENOUS EVERY 6 HOURS PRN
Status: DISCONTINUED | OUTPATIENT
Start: 2020-03-09 | End: 2020-03-10 | Stop reason: HOSPADM

## 2020-03-09 RX ORDER — ONDANSETRON 4 MG/1
4 TABLET, ORALLY DISINTEGRATING ORAL EVERY 6 HOURS PRN
Status: DISCONTINUED | OUTPATIENT
Start: 2020-03-09 | End: 2020-03-10 | Stop reason: HOSPADM

## 2020-03-09 RX ORDER — LIDOCAINE 40 MG/G
CREAM TOPICAL
Status: DISCONTINUED | OUTPATIENT
Start: 2020-03-09 | End: 2020-03-09 | Stop reason: HOSPADM

## 2020-03-09 RX ORDER — NALOXONE HYDROCHLORIDE 0.4 MG/ML
.1-.4 INJECTION, SOLUTION INTRAMUSCULAR; INTRAVENOUS; SUBCUTANEOUS
Status: DISCONTINUED | OUTPATIENT
Start: 2020-03-09 | End: 2020-03-10 | Stop reason: HOSPADM

## 2020-03-09 RX ORDER — PROPOFOL 10 MG/ML
INJECTION, EMULSION INTRAVENOUS PRN
Status: DISCONTINUED | OUTPATIENT
Start: 2020-03-09 | End: 2020-03-09

## 2020-03-09 RX ADMIN — PROPOFOL 50 MG: 10 INJECTION, EMULSION INTRAVENOUS at 13:34

## 2020-03-09 RX ADMIN — SODIUM CHLORIDE, SODIUM LACTATE, POTASSIUM CHLORIDE, CALCIUM CHLORIDE: 600; 310; 30; 20 INJECTION, SOLUTION INTRAVENOUS at 13:25

## 2020-03-09 RX ADMIN — LIDOCAINE HYDROCHLORIDE 100 MG: 20 INJECTION, SOLUTION INFILTRATION; PERINEURAL at 13:29

## 2020-03-09 RX ADMIN — PROPOFOL 40 MG: 10 INJECTION, EMULSION INTRAVENOUS at 13:31

## 2020-03-09 RX ADMIN — PROPOFOL 40 MG: 10 INJECTION, EMULSION INTRAVENOUS at 13:41

## 2020-03-09 RX ADMIN — ONDANSETRON 4 MG: 2 INJECTION INTRAMUSCULAR; INTRAVENOUS at 13:49

## 2020-03-09 RX ADMIN — PROPOFOL 150 MCG/KG/MIN: 10 INJECTION, EMULSION INTRAVENOUS at 13:29

## 2020-03-09 ASSESSMENT — MIFFLIN-ST. JEOR
SCORE: 1729.47
SCORE: 1729.47

## 2020-03-09 NOTE — ANESTHESIA POSTPROCEDURE EVALUATION
Anesthesia POST Procedure Evaluation    Patient: Jese Joe   MRN:     [id number] Gender:   male   Age:    45 year old :      1975        Preoperative Diagnosis: SBO (small bowel obstruction) (H) [K56.609]   Procedure(s):  COLONOSCOPY, WITH POLYPECTOMY AND BIOPSY   Postop Comments: No value filed.     Anesthesia Type: MAC       Disposition: Outpatient   Postop Pain Control: Uneventful            Sign Out: Well controlled pain   PONV: No   Neuro/Psych: Uneventful            Sign Out: Acceptable/Baseline neuro status   Airway/Respiratory: Uneventful            Sign Out: Acceptable/Baseline resp. status   CV/Hemodynamics: Uneventful            Sign Out: Acceptable CV status   Other NRE: NONE   DID A NON-ROUTINE EVENT OCCUR? No         Last Anesthesia Record Vitals:  CRNA VITALS  3/9/2020 1323 - 3/9/2020 1423      3/9/2020             Pulse:  79    SpO2:  99 %          Last PACU Vitals:  Vitals Value Taken Time   BP     Temp     Pulse 81 3/9/2020  1:50 PM   Resp     SpO2     Temp src Available 3/9/2020  1:46 PM   NIBP 128/79 3/9/2020  1:50 PM   Pulse 79 3/9/2020  1:51 PM   SpO2 99 % 3/9/2020  1:51 PM   Resp 0 3/9/2020  1:49 PM   Temp     Ht Rate 82 3/9/2020  1:49 PM   Temp 2           Electronically Signed By: Nuvia Wolff MD, 2020, 2:48 PM

## 2020-03-09 NOTE — DISCHARGE INSTRUCTIONS
Discharge Instructions after Colonoscopy  or Sigmoidoscopy    Today you had a _x___ Colonoscopy ____ Sigmoidoscopy    Activity and Diet  You were given medicine for pain. You may be dizzy or sleepy.  For 24 hours:    Do not drive or use heavy equipment.    Do not make important decisions.    Do not drink any alcohol.  You may return to your normal diet and medicines.    Discomfort    Air was placed in your colon during the exam in order to see it. Walking helps to pass the air.    You may take Tylenol (acetaminophen) for pain unless your doctor has told you not to.  Do not take aspirin or ibuprofen (Advil, Motrin, or other anti-inflammatory  drugs) for _____ days.    Follow-up  ____ We took small tissue samples or polyps to study. Your doctor will call you with the results  within two weeks.    When to call:    Call right away if you have:    Unusual pain in belly or chest pain not relieved with passing air.    More than 1 to 2 Tablespoons of bleeding from your rectum.    Fever above 100.6  F (37.5  C).    If you have severe pain, bleeding, or shortness of breath, go to an emergency room.    If you have questions, call:  Monday to Friday, 7 a.m. to 4:30 p.m.  Endoscopy: [phone] (We may have to call you back)    After hours  Hospital: [phone] (Ask for the GI fellow on call)

## 2020-03-09 NOTE — ANESTHESIA CARE TRANSFER NOTE
Patient: Jese Joe    Procedure(s):  COLONOSCOPY, WITH POLYPECTOMY AND BIOPSY    Diagnosis: SBO (small bowel obstruction) (H) [K56.609]  Diagnosis Additional Information: No value filed.    Anesthesia Type:   MAC     Note:  Airway :Room Air  Patient transferred to:Phase II  Comments: 144/97 97%  98.3-91-18  Handoff Report: Identifed the Patient, Identified the Reponsible Provider, Reviewed the pertinent medical history, Discussed the surgical course, Reviewed Intra-OP anesthesia mangement and issues during anesthesia, Set expectations for post-procedure period and Allowed opportunity for questions and acknowledgement of understanding      Vitals: (Last set prior to Anesthesia Care Transfer)    CRNA VITALS  3/9/2020 1323 - 3/9/2020 1358      3/9/2020             Pulse:  79    SpO2:  99 %                Electronically Signed By: LACHELLE Carvalho CRNA  March 9, 2020  1:58 PM

## 2020-03-09 NOTE — H&P
Jese Joe  [id number]  male  45 year old      Reason for procedure/surgery: history of small bowel obstruction; rule out Crohn's disease    Patient Active Problem List   Diagnosis     ACP (advance care planning)     SBO (small bowel obstruction) (H)     Chronic, continuous use of opioids       Past Surgical History:    Past Surgical History:   Procedure Laterality Date     COLONOSCOPY N/A 6/18/2019    Procedure: COLONOSCOPY, WITH POLYPECTOMY AND BIOPSY;  Surgeon: Urszula iVdes MD;  Location: [facility] OR     ESOPHAGOSCOPY, GASTROSCOPY, DUODENOSCOPY (EGD), COMBINED N/A 6/18/2019    Procedure: ESOPHAGOGASTRODUODENOSCOPY, WITH BIOPSY;  Surgeon: Urszula Vides MD;  Location: [facility] OR     GENITOURINARY SURGERY       NO SURGICAL HISTORY[         Past Medical History:   Past Medical History:   Diagnosis Date     History of steroid therapy      Stomach problems        Social History:   Social History     Tobacco Use     Smoking status: Light Tobacco Smoker     Packs/day: 0.25     Years: 33.00     Pack years: 8.25     Types: Cigarettes     Start date: 1/1/1987     Smokeless tobacco: Never Used   Substance Use Topics     Alcohol use: Yes     Comment: occasionally       Family History:   Family History   Problem Relation Age of Onset     Other - See Comments Mother      Diabetes Maternal Grandfather      Heart Disease Paternal Grandfather         MI     Diabetes Other      Colon Cancer No family hx of        Allergies:   Allergies   Allergen Reactions     Cleocin Hives       Active Medications:   Current Outpatient Medications   Medication Sig Dispense Refill     Acetaminophen (TYLENOL PO) Take 1,000 mg by mouth every 6 hours as needed        albuterol (PROAIR HFA/PROVENTIL HFA/VENTOLIN HFA) 108 (90 Base) MCG/ACT inhaler Inhale 2 puffs into the lungs every 4 hours as needed for shortness of breath / dyspnea or wheezing 1 Inhaler 3     ALPRAZolam (XANAX) 0.5 MG tablet Take 1 tablet (0.5 mg) by mouth 2 times daily as needed for  anxiety 20 tablet 0     cetirizine (ZYRTEC) 10 MG tablet Take 10 mg by mouth daily       fluticasone (FLONASE) 50 MCG/ACT nasal spray Spray 1-2 sprays into both nostrils daily 15.8 g 3     HYDROcodone-acetaminophen (NORCO) 5-325 MG tablet 1 po bid prn. 40 tablet 0     traZODone (DESYREL) 50 MG tablet Take 1 tablet (50 mg) by mouth At Bedtime 30 tablet 1     budesonide (ENTOCORT EC) 3 MG EC capsule Take 2 capsules (6 mg) by mouth every morning (Patient not taking: Reported on 2/13/2020) 60 capsule 1     ondansetron (ZOFRAN-ODT) 4 MG ODT tab DISSOLVE 1 TABLET IN MOUTH EVERY 6 HOURS AS NEEDED FOR NAUSEA 20 tablet 0     valACYclovir (VALTREX) 1000 mg tablet TAKE 1 TABLET BY MOUTH THREE TIMES DAILY FOR 7 DAYS 21 tablet 0       Systemic Review:   CONSTITUTIONAL: NEGATIVE for fever, chills, change in weight  ENT/MOUTH: NEGATIVE for ear, mouth and throat problems  RESP: NEGATIVE for significant cough or SOB  CV: NEGATIVE for chest pain, palpitations or peripheral edema    Physical Examination:   Vital Signs: BP (!) 145/104   Pulse 88   Temp 98.6  F (37  C) (Temporal)   Resp 18   Ht 1.829 m (6')   Wt 81.6 kg (180 lb)   SpO2 97%   BMI 24.41 kg/m    GENERAL: healthy, alert and no distress  NECK: no adenopathy, no asymmetry, masses, or scars  RESP: lungs clear to auscultation - no rales, rhonchi or wheezes  CV: regular rate and rhythm, normal S1 S2, no S3 or S4, no murmur, click or rub, no peripheral edema and peripheral pulses strong  ABDOMEN: soft, nontender, no hepatosplenomegaly, no masses and bowel sounds normal  MS: no gross musculoskeletal defects noted, no edema    Plan: Appropriate to proceed as scheduled.      Urszula Vides MD  3/9/2020    PCP:  Andrei Philip

## 2020-03-09 NOTE — PROGRESS NOTES
[facility] Outpatient Medical Nutrition Therapy      Time Spent: 45 Minutes  Session Type:  Initial Individual Session  Referring Physician: Dr. Vides  Reason for RD Visit: IBD     Nutrition Plan: Utilize the IBD-AID food list (Phase I and II) to help guide food choices. Decrease intake of processed/cured meats.     Recommendations for MD/Provider to order: None at this time    Malnutrition Diagnosis: Patient does not meet two of the above criteria necessary for diagnosing malnutrition    Nutrition Assessment:  Patient is here for intial visit with Registered Dietitian (RD).  Patient is a 45 year old male with history of SBO involving the terminal ileum in March of 2019. Following this he was seen by Dr. Vides 5/15/2019 at which time a plan was made to determine if Crohn's disease vs. NSAID-induced ulcer. He underwent a colonoscopy earlier today, which indicated a diagnosis of Crohn's. The patient is interested in attempting to treat with diet at this time and presents today to discuss diet in IBD. Diet recall suggests a diet high in processed/cured meats and low in fiber.    Past Surgical History: No IBD surgical history.    Symptom Review  1. Nausea/vomiting? Yes: Sometimes nausea. Infrequent  2. Heartburn? No  3. Bloating? Yes: A little bit depending on what he eats  4. Belching? No  5. Feeling full quickly? No  6. Decreased appetite? No  7. Weight loss/gain? No  8. Constipation/Diarrhea? Yes: 2-3 BMs/day. Loose most often  9. Urgency? No  10. Incomplete Bowel Emptying? No  11. Abdominal pain/pain with or without eating? Yes: Mild discomfort for 15-20 minutes after eating  12. Feeling tired? No    IBD-Q Score History  No flowsheet data found.    Dietary beliefs and Practices  10.  Do you take any vitamin, mineral, or herbal supplements? No  11.  Do you use any calorie/protein supplements?  No    Diet Recall:  (Typical Day)  Meal Name Time Food    Breakfast  Coffee AND eggs, molina, toast OR pancakes        Lunch   "[place] (salami [often], turkey, OR roast beef) OR Chinese        Dinner  Meat, potatoes, vegetables for supper        Snacks  Crackers, meat sticks   Beverages  Water, coffee, milk   Alcohol   Beer (couple beers 3-4 night/wk)     Frequency of eating/taking out meals: Varies, but on average 2 nights/wk  Food access/availability: Fine  Food preparation confidence/abilities: Fine    Anthropometrics:   Height: 6' 0\"  Weight: 180 lbs 0 oz  BMI: Body mass index is 24.41 kg/m .    Weight History:  Wt Readings from Last 10 Encounters:   03/09/20 81.6 kg (180 lb)   03/09/20 81.6 kg (180 lb)   03/04/20 81.2 kg (179 lb)   03/04/20 81.6 kg (179 lb 14.3 oz)   02/13/20 81.6 kg (180 lb)   02/11/20 82.1 kg (181 lb)   11/10/19 86.2 kg (190 lb)   10/22/19 86.2 kg (190 lb)   08/15/19 81.6 kg (180 lb)   06/18/19 83.9 kg (185 lb)     Usual Weight: 180 pounds  Weight change in past 6 months: Stable    Labs: Reviewed  Pertinent Medications/vitamin and mineral supplements:   Current Outpatient Medications   Medication     Acetaminophen (TYLENOL PO)     albuterol (PROAIR HFA/PROVENTIL HFA/VENTOLIN HFA) 108 (90 Base) MCG/ACT inhaler     ALPRAZolam (XANAX) 0.5 MG tablet     budesonide (ENTOCORT EC) 3 MG EC capsule     cetirizine (ZYRTEC) 10 MG tablet     fluticasone (FLONASE) 50 MCG/ACT nasal spray     HYDROcodone-acetaminophen (NORCO) 5-325 MG tablet     ondansetron (ZOFRAN-ODT) 4 MG ODT tab     traZODone (DESYREL) 50 MG tablet     valACYclovir (VALTREX) 1000 mg tablet     Current Facility-Administered Medications   Medication     methylPREDNISolone (DEPO-MEDROL) injection 80 mg     Facility-Administered Medications Ordered in Other Visits   Medication     flumazenil (ROMAZICON) injection 0.2 mg     May continue current IV fluid if patient has IV fluids infusing until discharge.     naloxone (NARCAN) injection 0.1-0.4 mg     ondansetron (ZOFRAN-ODT) ODT tab 4 mg    Or     ondansetron (ZOFRAN) injection 4 mg     sodium chloride (PF) 0.9% PF " flush 3 mL       Food Allergies: None  Food Intolerances: None  Physical Activity: Nothing formal  Estimated Nutrition Needs based on most recent body weight of 180 lbs 0 oz  : 2000 calories (25 kcals/kg), 80 g protein (1.0 g/kg), 2000 ml fluids (~1 ml/kcal or total fluids per MD).     MALNUTRITION:  % Weight Loss:  None noted  % Intake:  No decreased intake noted  Subcutaneous Fat Loss:  None observed  Muscle Loss:  None observed  Fluid Retention:  None noted    Nutrition Diagnosis:    Food and nutrition related knowledge deficit related to IBD as evidenced by need for diet education.    Nutrition Prescription: Regular diet    Nutrition Intervention:    Nutrition Education/Counseling:  Discussed diet and IBD history. Reviewed components of common diets currently proposed for IBD: Autoimmune protocol diet, IBD Anti-Inflammatory diet, Specific carbohydrate diet, Semi-vegetarian diet. Discussed that there is limited data on diet in IBD, but that these diets have been followed with some success in IBD. Highlighted that these all have a similar general theme geared towards more of a plant-based, less-processed diet composition. Introduced the IBD Anti-inflammatory diet food list. Discussed that certain aspects of the diet (e.g. gluten free, dairy free) may be overly restrictive, but that the food list seems to provide a reasonable texture-based diet advancement that can help guide food reintroduction. Discussed increasing intake of prebiotic/soluble fiber containing foods and consuming a more consistent intake of fiber throughout the day. Discussed limiting intake of processed/cured meats as well as high fat foods (e.g. fried foods).     Educational Materials Provided: AID-IBD  Patient verbalized understanding of education provided. See all recommendations under Goals.    Goals:  1. Utilize the AID-IBD food list to help guide food choices (Phase I and Phase II)    2. Discussed introducing a source of fiber with  breakfast utilizing whole wheat products (bread, pancakes) and fruit.    3. Discussed use of plain meats with sandwich at lunch (examples deli turkey and roast beef) or plant-based protein sources such as peanut butter.     4. Discussed portions at evening meal. Limit intake of meat portion for a balanced fiber intake. Also discussed ensuring consumption of cooked vegetables to help with digestion (reference AID-IBD food list Phase I and II).     5. Discussed replacing meat sticks as a snack (reference AID-IBD food list Phase I and II)    Nutrition Monitoring and Evaluation: Will monitor adherence to nutrition recommendations at future RD visits.     Further Medical Nutrition Therapy: As needed  Next Appointment (if applicable): As needed  Patient was encouraged to call/contact RD with any further questions.    Po Washington, PhD, RD

## 2020-03-09 NOTE — PATIENT INSTRUCTIONS
Salvador Sawant,    It was great meeting you today. Below is a summary of what we discussed:    1. Utilize the AID-IBD food list to help guide food choices (Phase I and Phase II)    2. Discussed introducing a source of fiber with breakfast utilizing whole wheat products (bread, pancakes) and fruit.    3. Discussed use of plain meats with sandwich at lunch (examples deli turkey and roast beef) or plant-based protein sources such as peanut butter.     4. Discussed portions at evening meal. Limit intake of meat portion for a balanced fiber intake. Also discussed ensuring consumption of cooked vegetables to help with digestion (reference AID-IBD food list Phase I and II).     5. Discussed replacing meat sticks as a snack (reference AID-IBD food list Phase I and II)    Best regards,  Po Washington, PhD, RD

## 2020-03-09 NOTE — ANESTHESIA PREPROCEDURE EVALUATION
Anesthesia Pre-Procedure Evaluation    Patient: Jese Joe   MRN:     [id number] Gender:   male   Age:    45 year old :      1975        Preoperative Diagnosis: SBO (small bowel obstruction) (H) [K56.609]   Procedure(s):  COLONOSCOPY     LABS:  CBC:   Lab Results   Component Value Date    WBC 8.8 05/15/2019    WBC 7.2 2019    HGB 15.0 05/15/2019    HGB 12.3 (L) 2019    HCT 45.8 05/15/2019    HCT 36.9 (L) 2019     05/15/2019     2019     BMP:   Lab Results   Component Value Date     05/15/2019     2019    POTASSIUM 4.0 05/15/2019    POTASSIUM 3.8 2019    CHLORIDE 104 05/15/2019    CHLORIDE 109 2019    CO2 27 05/15/2019    CO2 29 2019    BUN 16 05/15/2019    BUN 12 2019    CR 0.88 05/15/2019    CR 0.80 2019    GLC 83 05/15/2019     (H) 2019     COAGS: No results found for: PTT, INR, FIBR  POC: No results found for: BGM, HCG, HCGS  OTHER:   Lab Results   Component Value Date    LACT 0.6 (L) 2019    GEOFFREY 9.3 05/15/2019    ALBUMIN 3.9 05/15/2019    PROTTOTAL 7.4 05/15/2019    ALT 25 05/15/2019    AST 19 05/15/2019    ALKPHOS 70 05/15/2019    BILITOTAL 0.5 05/15/2019    LIPASE 105 2019    AMYLASE 32 2019    CRP <2.9 05/15/2019    SED 5 05/15/2019        Preop Vitals    BP Readings from Last 3 Encounters:   20 (!) 145/104   20 110/70   20 114/70    Pulse Readings from Last 3 Encounters:   20 88   20 70   20 120      Resp Readings from Last 3 Encounters:   20 18   20 16   11/10/19 18    SpO2 Readings from Last 3 Encounters:   20 97%   20 98%   20 96%      Temp Readings from Last 1 Encounters:   20 36.2  C (97.1  F) (Tympanic)    Ht Readings from Last 1 Encounters:   20 1.829 m (6')      Wt Readings from Last  Encounters:   20 81.6 kg (180 lb)    Estimated body mass index is 24.41 kg/m  as calculated from the  following:    Height as of this encounter: 1.829 m (6').    Weight as of this encounter: 81.6 kg (180 lb).     LDA:        Past Medical History:   Diagnosis Date     History of steroid therapy      Stomach problems       Past Surgical History:   Procedure Laterality Date     COLONOSCOPY N/A 6/18/2019    Procedure: COLONOSCOPY, WITH POLYPECTOMY AND BIOPSY;  Surgeon: Urszula Vides MD;  Location: [facility] OR     ESOPHAGOSCOPY, GASTROSCOPY, DUODENOSCOPY (EGD), COMBINED N/A 6/18/2019    Procedure: ESOPHAGOGASTRODUODENOSCOPY, WITH BIOPSY;  Surgeon: Urszula Vides MD;  Location: [facility] OR     GENITOURINARY SURGERY       NO SURGICAL HISTORY[        Allergies   Allergen Reactions     Cleocin Hives        Anesthesia Evaluation     . Pt has had prior anesthetic. Type: MAC    No history of anesthetic complications          ROS/MED HX    ENT/Pulmonary:  - neg pulmonary ROS     Neurologic:  - neg neurologic ROS     Cardiovascular:  - neg cardiovascular ROS       METS/Exercise Tolerance:     Hematologic:         Musculoskeletal:         GI/Hepatic:  - neg GI/hepatic ROS       Renal/Genitourinary:  - ROS Renal section negative       Endo:  - neg endo ROS       Psychiatric:  - neg psychiatric ROS       Infectious Disease:  - neg infectious disease ROS       Malignancy:      - no malignancy   Other:                     JZG FV AN PHYSICAL EXAM    Assessment:   ASA SCORE: 2       NPO Status: NPO Appropriate     Plan:   Anes. Type:  MAC   Pre-Medication: None   Induction:  N/a   Airway: Native Airway   Access/Monitoring: PIV   Maintenance: N/a     Postop Plan:   Postop Pain: None  Postop Sedation/Airway: Not planned  Disposition: Outpatient     PONV Management:    Prevention: Ondansetron, Propofol                   Nuvia Wolff MD

## 2020-03-10 LAB — COPATH REPORT: NORMAL

## 2020-03-13 ENCOUNTER — DOCUMENTATION ONLY (OUTPATIENT)
Dept: ORTHOPEDICS | Facility: CLINIC | Age: 45
End: 2020-03-13

## 2020-03-13 NOTE — PROGRESS NOTES
Completed Continuing Disability Claim Form completed and faxed to [employer] at [phone]. Completed FMLA Form and faxed to [employer] at [phone]. Submitted originals to Medical Records for scanning.

## 2020-03-14 ENCOUNTER — HOSPITAL ENCOUNTER (EMERGENCY)
Facility: HOSPITAL | Age: 45
Discharge: HOME | End: 2020-03-14
Attending: PHYSICIAN ASSISTANT | Admitting: PHYSICIAN ASSISTANT
Payer: COMMERCIAL

## 2020-03-14 VITALS
OXYGEN SATURATION: 98 % | DIASTOLIC BLOOD PRESSURE: 112 MMHG | TEMPERATURE: 98.4 F | SYSTOLIC BLOOD PRESSURE: 179 MMHG | RESPIRATION RATE: 16 BRPM

## 2020-03-14 DIAGNOSIS — R30.0 DYSURIA: ICD-10-CM

## 2020-03-14 LAB
ALBUMIN UR-MCNC: 30 MG/DL
APPEARANCE UR: ABNORMAL
BACTERIA #/AREA URNS HPF: ABNORMAL /HPF
BILIRUB UR QL STRIP: NEGATIVE
C TRACH DNA SPEC QL PROBE+SIG AMP: NOT DETECTED
COLOR UR AUTO: YELLOW
GLUCOSE UR STRIP-MCNC: NEGATIVE MG/DL
HGB UR QL STRIP: NEGATIVE
KETONES UR STRIP-MCNC: NEGATIVE MG/DL
LEUKOCYTE ESTERASE UR QL STRIP: NEGATIVE
N GONORRHOEA DNA SPEC QL PROBE+SIG AMP: NOT DETECTED
NITRATE UR QL: NEGATIVE
PH UR STRIP: 7.5 PH (ref 4.7–8)
RBC #/AREA URNS AUTO: 0 /HPF (ref 0–2)
SOURCE: ABNORMAL
SP GR UR STRIP: 1.02 (ref 1–1.03)
SPECIMEN SOURCE: NORMAL
SPECIMEN SOURCE: NORMAL
T VAGINALIS DNA SPEC QL NAA+PROBE: NORMAL
UROBILINOGEN UR STRIP-MCNC: NORMAL MG/DL (ref 0–2)
WBC #/AREA URNS AUTO: 0 /HPF (ref 0–5)

## 2020-03-14 PROCEDURE — 87491 CHLMYD TRACH DNA AMP PROBE: CPT | Performed by: NURSE PRACTITIONER

## 2020-03-14 PROCEDURE — G0463 HOSPITAL OUTPT CLINIC VISIT: HCPCS

## 2020-03-14 PROCEDURE — 99213 OFFICE O/P EST LOW 20 MIN: CPT | Mod: Z6 | Performed by: PHYSICIAN ASSISTANT

## 2020-03-14 PROCEDURE — 87591 N.GONORRHOEAE DNA AMP PROB: CPT | Performed by: NURSE PRACTITIONER

## 2020-03-14 PROCEDURE — 81001 URINALYSIS AUTO W/SCOPE: CPT | Performed by: NURSE PRACTITIONER

## 2020-03-14 PROCEDURE — 87661 TRICHOMONAS VAGINALIS AMPLIF: CPT | Performed by: NURSE PRACTITIONER

## 2020-03-14 NOTE — ED TRIAGE NOTES
Urinary frequency and urgency over the last few days. States had unprotected sex with a new partner about a month ago.

## 2020-03-14 NOTE — ED PROVIDER NOTES
History     Chief Complaint   Patient presents with     UTI     HPI  Jese Joe is a 45 year old male who presents with complaints of dysuria, frequency, urgency for several day(s) and associated groin pain.  New sexual partner one month ago.  She had not had symptoms.  Denies fever, nausea, vomiting, flank pain, abdominal pain.  No prior history of UTI.        Allergies:  Allergies   Allergen Reactions     Cleocin Hives       Problem List:    Patient Active Problem List    Diagnosis Date Noted     Chronic, continuous use of opioids 09/23/2019     Priority: Medium     SBO (small bowel obstruction) (H) 03/26/2019     Priority: Medium     ACP (advance care planning) 07/31/2017     Priority: Medium     Advance Care Planning 7/31/2017: ACP Review of Chart / Resources Provided:  Reviewed chart for advance care plan.  Jese Joe has no plan or code status on file. Discussed available resources and provided with information.   Added by Indira Santoro                  Past Medical History:    Past Medical History:   Diagnosis Date     History of steroid therapy      Stomach problems        Social History:  Marital Status:   [2]  Social History     Tobacco Use     Smoking status: Light Tobacco Smoker     Packs/day: 0.25     Years: 33.00     Pack years: 8.25     Types: Cigarettes     Start date: 1/1/1987     Smokeless tobacco: Never Used   Substance Use Topics     Alcohol use: Yes     Comment: occasionally     Drug use: No        Medications:    Acetaminophen (TYLENOL PO)  albuterol (PROAIR HFA/PROVENTIL HFA/VENTOLIN HFA) 108 (90 Base) MCG/ACT inhaler  ALPRAZolam (XANAX) 0.5 MG tablet  budesonide (ENTOCORT EC) 3 MG EC capsule  cetirizine (ZYRTEC) 10 MG tablet  fluticasone (FLONASE) 50 MCG/ACT nasal spray  HYDROcodone-acetaminophen (NORCO) 5-325 MG tablet  ondansetron (ZOFRAN-ODT) 4 MG ODT tab  traZODone (DESYREL) 50 MG tablet  valACYclovir (VALTREX) 1000 mg tablet          Review of Systems :  Constitutional:  Negative for fever.   : Positive for dysuria, frequency, urgency.  Abdomen: Negative for flank pain.      Physical Exam   BP: (!) 179/112  Heart Rate: 113  Temp: 98.4  F (36.9  C)  Resp: 16  SpO2: 98 %    Physical Exam   Constitutional: Well-developed and well-nourished.   Head: Normocephalic and atraumatic.   Eyes: Conjunctivae and EOM are normal. Pupils are equal, round, and reactive to light.   Neck: Normal range of motion.   Pulmonary/Chest: Effort normal  Abdomen: No CVA tenderness to percussion.  Skin: Skin is warm and dry. No rash noted.   Neuro: Gait normal.      ED Course        No evidence of G/C or UTI.  Recommended recheck with PCP in one week.       Results for orders placed or performed during the hospital encounter of 03/14/20 (from the past 24 hour(s))   GC/Chlamydia by PCR - [facility],    Specimen: Urine   Result Value Ref Range    Specimen Source Midstream Urine     Neisseria gonorrhoreae PCR Not Detected NDET^Not Detected    Chlamydia Trachomatis PCR Not Detected NDET^Not Detected   Trichomonas vaginalis DNA PCR    Specimen: Midstream Urine   Result Value Ref Range    Specimen Description Midstream Urine     Trichomonas vaginalis DNA PCR  NOTV^Not Detected: Trichomonas target DNA is not detected. All internal controls meet acceptance criteria.     Not Detected: Trichomonas target DNA is not detected. All internal controls meet   acceptance criteria.      UA with Microscopic reflex to Culture    Specimen: Midstream Urine   Result Value Ref Range    Color Urine Yellow     Appearance Urine Cloudy     Glucose Urine Negative NEG^Negative mg/dL    Bilirubin Urine Negative NEG^Negative    Ketones Urine Negative NEG^Negative mg/dL    Specific Gravity Urine 1.024 1.003 - 1.035    Blood Urine Negative NEG^Negative    pH Urine 7.5 4.7 - 8.0 pH    Protein Albumin Urine 30 (A) NEG^Negative mg/dL    Urobilinogen mg/dL Normal 0.0 - 2.0 mg/dL    Nitrite Urine Negative NEG^Negative    Leukocyte Esterase Urine  Negative NEG^Negative    Source Midstream Urine     WBC Urine 0 0 - 5 /HPF    RBC Urine 0 0 - 2 /HPF    Bacteria Urine None (A) NEG^Negative /HPF       Medications - No data to display    Assessments & Plan (with Medical Decision Making)     I have reviewed the nursing notes.    I have reviewed the findings, diagnosis, plan and need for follow up with the patient.  Recommended return to clinic or ED if new fever, vomiting, flank pain that would be more concerning for kidney infection or if any new or concerning symptoms develop.   Follow-up with primary provider if symptoms not resolving in 5-7 days.         Final diagnoses:   Dysuria       VIDAL Keita on 3/14/2020 at 6:23 PM   3/14/2020   [facility]            Geoff Luther PA  03/14/20 2146

## 2020-03-14 NOTE — ED TRIAGE NOTES
Patient states he's had some urinary frequency and hesitancy for about 4 days now. States unprotected sex about a month ago now.

## 2020-03-19 ENCOUNTER — HOSPITAL ENCOUNTER (OUTPATIENT)
Dept: PHYSICAL THERAPY | Facility: HOSPITAL | Age: 45
Setting detail: THERAPIES SERIES
End: 2020-03-19
Attending: FAMILY MEDICINE
Payer: COMMERCIAL

## 2020-03-19 PROCEDURE — 97161 PT EVAL LOW COMPLEX 20 MIN: CPT | Mod: GP

## 2020-03-19 PROCEDURE — 97110 THERAPEUTIC EXERCISES: CPT | Mod: GP

## 2020-03-19 NOTE — PROGRESS NOTES
Initial Physical Therapy Evaluation      Name: Jese Joe MRN# [id number]   Age: 45 year old YOB: 1975     Date of Consultation: March 19, 2020  Primary care provider: Andrei Philip    Referring Physician: Dr. Philip  Orders: Eval and Treat  4 quadrant stretching for range of motion/stiffness and then cuff/deltoid periscpaular stabilization  Medical Diagnosis: L shoulder pain, unspecified chronicity. Left shoulder pain.   Onset of Illness/Injury: one year history    Reason for PT Visit: Patient had been having significant pain where he couldn't sleep and was having difficulty lifting it. Across body and behind and the back motions were painful.     2 weeks ago had injections and this helped a great deal.     Had numbness and tingling down into fingers and forearm with 3rd 4th and 5th digit involved. This has had 90% improvement since the injection.     Patient does has history of a fall on [employer] at work parking lot.     Patient is R hand dominant.       Prior Level of Function: A year of shoulder issues with times when it was more severe and limiting for functional activites.     Pain: 0-9/10  Burning and hot poker, numbness/tingling    Community Support/Living Environment/Employment History: Employed as ED/Urgent Care RN     Patient/Family Goal: have full use without limitation    Fall Screen:   Have you fallen 2 or more times in the last year? Yes  Have you fallen and had an injury in the last year? Yes  Timed up & go: NT  Is patient a fall risk? No, slip on ice     Past Medical History:   Past Medical History:   Diagnosis Date     History of steroid therapy      Stomach problems        Past Surgical History:  Past Surgical History:   Procedure Laterality Date     COLONOSCOPY N/A 6/18/2019    Procedure: COLONOSCOPY, WITH POLYPECTOMY AND BIOPSY;  Surgeon: Urszula Vides MD;  Location: [facility]     COLONOSCOPY N/A 3/9/2020    Procedure: COLONOSCOPY, WITH POLYPECTOMY AND BIOPSY;   Surgeon: Urszula Vides MD;  Location: [facility]     ESOPHAGOSCOPY, GASTROSCOPY, DUODENOSCOPY (EGD), COMBINED N/A 6/18/2019    Procedure: ESOPHAGOGASTRODUODENOSCOPY, WITH BIOPSY;  Surgeon: Urszula Vides MD;  Location: [facility]     GENITOURINARY SURGERY       NO SURGICAL HISTORY[         Medications:   Current Outpatient Medications   Medication Sig     Acetaminophen (TYLENOL PO) Take 1,000 mg by mouth every 6 hours as needed      albuterol (PROAIR HFA/PROVENTIL HFA/VENTOLIN HFA) 108 (90 Base) MCG/ACT inhaler Inhale 2 puffs into the lungs every 4 hours as needed for shortness of breath / dyspnea or wheezing     ALPRAZolam (XANAX) 0.5 MG tablet Take 1 tablet (0.5 mg) by mouth 2 times daily as needed for anxiety     budesonide (ENTOCORT EC) 3 MG EC capsule Take 2 capsules (6 mg) by mouth every morning (Patient not taking: Reported on 2/13/2020)     cetirizine (ZYRTEC) 10 MG tablet Take 10 mg by mouth daily     fluticasone (FLONASE) 50 MCG/ACT nasal spray Spray 1-2 sprays into both nostrils daily     HYDROcodone-acetaminophen (NORCO) 5-325 MG tablet 1 po bid prn.     ondansetron (ZOFRAN-ODT) 4 MG ODT tab DISSOLVE 1 TABLET IN MOUTH EVERY 6 HOURS AS NEEDED FOR NAUSEA     traZODone (DESYREL) 50 MG tablet Take 1 tablet (50 mg) by mouth At Bedtime     valACYclovir (VALTREX) 1000 mg tablet TAKE 1 TABLET BY MOUTH THREE TIMES DAILY FOR 7 DAYS     Current Facility-Administered Medications   Medication     methylPREDNISolone (DEPO-MEDROL) injection 80 mg       Imaging: IMPRESSION:   1.   Edema at the AC joint, likely reactive related to mild to  moderate degenerative change. No evidence of fracture. These changes  may also be related to a repetitive injury.     2.  Moderate to severe tendinosis involving the anterior fibers of the  supraspinatous and distal fibers of the subscapularis.     3.  Localized fraying suggested along the superior and posterior  aspect of the labrum at 12:00 and 9:00.     GERDA COTTON MD    Exam: 4  views of the left shoulder dated 3/4/2020.     COMPARISON: MRI knee dated 10/10/2019.     CLINICAL HISTORY: Left shoulder pain.     FINDINGS: Neutral, Grashey, axillary, and Y views of the left shoulder  were obtained. No evidence of fracture or dislocation involving the  left shoulder. The acromioclavicular joint is well aligned.                                                                      IMPRESSION: No evidence of fracture or dislocation involving the left  shoulder.     CHHAYA ROBLERO MD    Musculoskeletal Findings:     OBJECTIVE   Observation: Patient appears to PT in no acute distress  Palpation: Mild tenderness at AC joint. Non-tender to biceps tendon and subscapularis/ axillary region.     Posture: forward head, protracted shoulders  Sitting Posture: Fair.   Standing Posture: Good    Range of Motion/Strength:   Cervical range of motion: within normal limits    Shoulder Range of Motion and Strength    RIGHT Shoulder ROM (Active)  Flexion: Full  Abduction: Full   Internal Rotation: Full  Functional IR: Full   External Rotation: Full  Functional ER: Full     LEFT Shoulder ROM (Active)  Flexion: 70%, pain  Abduction: 70%, pain and tension  Internal Rotation: 70%, pain   External Rotation: 80%    Bilateral elbow, wrist, and hand range of motion and strength within normal limits.  Lower and middle trapezius strength 4-/5 L UE    STRENGTH        Limitations in flexion and abduction strength due to pain in testing positions.       Functional Outcomes:   SPADI: 6.9  Short-term goals:  To be achieved in 2-3 weeks:  Instruct in home program    1.) Patient will understand biomechanical stressors of the shoulder joint in order to make modifications to activities to reduce further risk of injury.  2.) Patient will be independent with a short-term home exercise program.  3.) Patient will report 50% improvement of overall symptoms to allow decreased shoulder pain with daily movement and activity.       Long-term  goals:  To be achieved in 6-8 weeks:  Self management of symptoms  Pain free activities of daily living  Return to previous level of function    1.) Patient will report 80% improvement of overall symptoms to allow decreased shoulder pain with daily movement and activity  2.) Patient will be able to perform full functional L UE use without limitation from pain or functional weakness including essential job duties including lifting, reaching, carrying.     Discharge goals: Patient will be independent with a home program for self-management of symptoms.  Prognosis/Plan of Care: Good  Appropriate for Physical Therapy Intervention: Yes       Planned Interventions: Therapeutic exercise, manual therapy, therapeutic activity, patient education    HEP: supine AAROM flexion stretch, scaption stretch, ER stretch supine, IR stretch standing. Prone scap set with shoulder extension. Frequency 2x/day. Test, re-test showed large improvement in motion.     Clinical Impressions:  Criteria for Skilled Therapeutic Intervention Met: Yes   PT Diagnosis: L shoulder pain with limited motion and function strength  Influenced by the following impairments: tension, limited motion  Functional limitations due to impairment: pain with positions above 80 degrees flexion and abduction, pain with sleep  Clinical presentation: Stable/Uncomplicated  Clinical presentation rationale: clinical judgement  Clinical Decision making (complexity): Low Complexity  Predicted Duration of Therapy Intervention (days/wks): 8 weeks  Risks and Benefits of therapy have been explained: Yes  Patient, Family & other staff in agreement with plan of care: Yes  Comments: If patient does not have expected response and improvement, will further explore for cervical component/contribution.   Frequency: 1-2 times/week    Treatment Rendered/Intervention:  Evaluation completed as described above followed by discussion of exam findings and plan of care.      Total Evaluation  Time: 20     I certify the need for these services furnished under this plan of treatment and while under my care. (Physician co-signature of this document indicates review and certification of the therapy plan).

## 2020-03-27 ENCOUNTER — TELEPHONE (OUTPATIENT)
Dept: FAMILY MEDICINE | Facility: OTHER | Age: 45
End: 2020-03-27

## 2020-03-27 ENCOUNTER — APPOINTMENT (OUTPATIENT)
Dept: OCCUPATIONAL MEDICINE | Facility: OTHER | Age: 45
End: 2020-03-27

## 2020-03-27 NOTE — TELEPHONE ENCOUNTER
No respiratory distress  Able to speak full sentences  Patient will be notified of results of COVID 19 testing once available  Discussed quarantine   If develop concerning or worsening respiratory problems, difficulty breathing, any change in symptoms or any concerns should go to the ER.    Romy ALROSE FNP-BC  Family Nurse Practitioner'

## 2020-05-01 ENCOUNTER — PATIENT OUTREACH (OUTPATIENT)
Dept: GASTROENTEROLOGY | Facility: CLINIC | Age: 45
End: 2020-05-01

## 2020-05-01 DIAGNOSIS — K50.90 CROHN'S DISEASE (H): Primary | ICD-10-CM

## 2020-05-01 NOTE — PROGRESS NOTES
Patient due for a FCP will send the specimen bottle to the patient My chart message sent to the patient informing him that the specimen bottle will be sent in the mail.

## 2020-05-13 DIAGNOSIS — F41.9 ANXIETY: ICD-10-CM

## 2020-05-13 RX ORDER — ALPRAZOLAM 0.5 MG
TABLET ORAL
Qty: 20 TABLET | Refills: 0 | Status: SHIPPED | OUTPATIENT
Start: 2020-05-13 | End: 2020-09-10

## 2020-05-13 NOTE — TELEPHONE ENCOUNTER
ALPRAZolam (XANAX) 0.5 MG tablet      Last Written Prescription Date:  2/11/20  Last Fill Quantity: 20,   # refills: 0  Last Office Visit: 2/11/20  Future Office visit:       Routing refill request to provider for review/approval because:  Drug not on the [facility], [facility] or [facility] refill protocol or controlled substance

## 2020-06-04 ENCOUNTER — APPOINTMENT (OUTPATIENT)
Dept: OCCUPATIONAL MEDICINE | Facility: OTHER | Age: 45
End: 2020-06-04

## 2020-06-04 ENCOUNTER — RESULTS ONLY (OUTPATIENT)
Dept: LAB | Age: 45
End: 2020-06-04

## 2020-06-05 LAB
SARS-COV-2 RNA SPEC QL NAA+PROBE: NOT DETECTED
SPECIMEN SOURCE: NORMAL

## 2020-06-24 ENCOUNTER — OFFICE VISIT (OUTPATIENT)
Dept: CHIROPRACTIC MEDICINE | Facility: OTHER | Age: 45
End: 2020-06-24
Attending: CHIROPRACTOR
Payer: COMMERCIAL

## 2020-06-24 DIAGNOSIS — M99.02 SEGMENTAL AND SOMATIC DYSFUNCTION OF THORACIC REGION: ICD-10-CM

## 2020-06-24 DIAGNOSIS — M54.2 CERVICALGIA: ICD-10-CM

## 2020-06-24 DIAGNOSIS — M99.03 SEGMENTAL AND SOMATIC DYSFUNCTION OF LUMBAR REGION: ICD-10-CM

## 2020-06-24 DIAGNOSIS — M99.01 SEGMENTAL AND SOMATIC DYSFUNCTION OF CERVICAL REGION: Primary | ICD-10-CM

## 2020-06-24 PROCEDURE — 98941 CHIROPRACT MANJ 3-4 REGIONS: CPT | Mod: AT | Performed by: CHIROPRACTOR

## 2020-06-25 NOTE — PROGRESS NOTES
Subjective Finding:    Chief compalint: Patient presents with:  Neck Pain  Back Pain  , Pain Scale: 5/10, Intensity: dull and ache, Duration: 2 weeks, Radiating: no.    Date of injury:     Activities that the pain restricts:   Home/household/hobbies/social activities: yes.  Work duties: no.  Sleep: no.  Makes symptoms better: rest.  Makes symptoms worse: activity and lumbar flexion.  Have you seen anyone else for the symptoms? No.  Work related: no.  Automobile related injury: no.    Objective and Assessment:    Posture Analysis:   High shoulder: .  Head tilt: .  High iliac crest: .  Head carriage: neutral.  Thoracic Kyphosis: neutral.  Lumbar Lordosis: forward.    Lumbar Range of Motion: extension decreased.  Cervical Range of Motion: .  Thoracic Range of Motion: .  Extremity Range of Motion: .    Palpation:   Quad lumb: bilateral, referred pain: no    Segmental dysfunction pre-treatment and treatment area: C3, T6, T7, L3, L4 and L5.    Assessment post-treatment:  Cervical: .  Thoracic: ROM increased.  Lumbar: ROM increased.    Comments: .      Complicating Factors: .    Plan / Procedure:    Treatment plan: PRN.  Instructed patient: stretch as instructed at visit.  Short term goals: reduce pain and increase ROM.  Long term goals: restore normal function.  Prognosis: excellent.

## 2020-06-27 ENCOUNTER — HOSPITAL ENCOUNTER (EMERGENCY)
Facility: HOSPITAL | Age: 45
Discharge: HOME | End: 2020-06-27
Attending: NURSE PRACTITIONER | Admitting: NURSE PRACTITIONER

## 2020-06-27 ENCOUNTER — APPOINTMENT (OUTPATIENT)
Dept: GENERAL RADIOLOGY | Facility: HOSPITAL | Age: 45
End: 2020-06-27
Attending: EMERGENCY MEDICINE

## 2020-06-27 VITALS
OXYGEN SATURATION: 99 % | TEMPERATURE: 98.6 F | RESPIRATION RATE: 20 BRPM | DIASTOLIC BLOOD PRESSURE: 99 MMHG | SYSTOLIC BLOOD PRESSURE: 173 MMHG | HEART RATE: 86 BPM

## 2020-06-27 DIAGNOSIS — S56.911A ELBOW STRAIN, RIGHT, INITIAL ENCOUNTER: ICD-10-CM

## 2020-06-27 PROCEDURE — 73070 X-RAY EXAM OF ELBOW: CPT | Mod: TC,RT

## 2020-06-27 PROCEDURE — G0463 HOSPITAL OUTPT CLINIC VISIT: HCPCS | Mod: 25

## 2020-06-27 PROCEDURE — 99213 OFFICE O/P EST LOW 20 MIN: CPT | Mod: Z6 | Performed by: NURSE PRACTITIONER

## 2020-06-27 PROCEDURE — 96372 THER/PROPH/DIAG INJ SC/IM: CPT

## 2020-06-27 PROCEDURE — 25000128 H RX IP 250 OP 636

## 2020-06-27 RX ORDER — KETOROLAC TROMETHAMINE 10 MG/1
10 TABLET, FILM COATED ORAL EVERY 6 HOURS PRN
Qty: 20 TABLET | Refills: 0 | Status: SHIPPED | OUTPATIENT
Start: 2020-06-27 | End: 2021-12-11

## 2020-06-27 RX ORDER — KETOROLAC TROMETHAMINE 30 MG/ML
30 INJECTION, SOLUTION INTRAMUSCULAR; INTRAVENOUS ONCE
Status: COMPLETED | OUTPATIENT
Start: 2020-06-27 | End: 2020-06-27

## 2020-06-27 RX ORDER — KETOROLAC TROMETHAMINE 30 MG/ML
INJECTION, SOLUTION INTRAMUSCULAR; INTRAVENOUS
Status: COMPLETED
Start: 2020-06-27 | End: 2020-06-27

## 2020-06-27 RX ORDER — KETOROLAC TROMETHAMINE 30 MG/ML
30 INJECTION, SOLUTION INTRAMUSCULAR; INTRAVENOUS ONCE
Status: DISCONTINUED | OUTPATIENT
Start: 2020-06-27 | End: 2020-06-27 | Stop reason: CLARIF

## 2020-06-27 RX ADMIN — KETOROLAC TROMETHAMINE 30 MG: 30 INJECTION, SOLUTION INTRAMUSCULAR at 19:31

## 2020-06-27 RX ADMIN — KETOROLAC TROMETHAMINE 30 MG: 30 INJECTION, SOLUTION INTRAMUSCULAR; INTRAVENOUS at 19:31

## 2020-06-27 NOTE — ED AVS SNAPSHOT
[facility]  [address] [address]  Phone:  [phone]                                    Jese Joe   MRN: [id number]    Department:  [facility]   Date of Visit:  6/27/2020           After Visit Summary Signature Page    I have received my discharge instructions, and my questions have been answered. I have discussed any challenges I see with this plan with the nurse or doctor.    ..........................................................................................................................................  Patient/Patient Representative Signature      ..........................................................................................................................................  Patient Representative Print Name and Relationship to Patient    ..................................................               ................................................  Date                                   Time    ..........................................................................................................................................  Reviewed by Signature/Title    ...................................................              ..............................................  Date                                               Time          22EPIC Rev 08/18

## 2020-06-27 NOTE — ED TRIAGE NOTES
Pt in for evaluation of right elbow pain. Pt was transferring a pt when he felt and heard a pop. Swelling in the area. mvmt difficult.

## 2020-06-28 NOTE — ED PROVIDER NOTES
History     Chief Complaint   Patient presents with     Elbow Pain     HPI  Jese Joe is a 45 year old male who is here with a right elbow injury.  This occured at place of employment about 10 minutes ago, as he was lifting a patient and noticed his right elbow snap, resulting in immediate pain.  Currently rates his pain at about 7-8 out of 10, located the medial lateral joint lines of the olecranon.  He has been icing area.  Is not taking anything for pain medication.  He denies weakness and paresthesias of the extremity.    Allergies:  Allergies   Allergen Reactions     Cleocin Hives       Problem List:    Patient Active Problem List    Diagnosis Date Noted     Chronic, continuous use of opioids 09/23/2019     Priority: Medium     SBO (small bowel obstruction) (H) 03/26/2019     Priority: Medium     ACP (advance care planning) 07/31/2017     Priority: Medium     Advance Care Planning 7/31/2017: ACP Review of Chart / Resources Provided:  Reviewed chart for advance care plan.  Jese Joe has no plan or code status on file. Discussed available resources and provided with information.   Added by Indira Santoro                  Past Medical History:    Past Medical History:   Diagnosis Date     History of steroid therapy      Stomach problems        Past Surgical History:    Past Surgical History:   Procedure Laterality Date     COLONOSCOPY N/A 6/18/2019    Procedure: COLONOSCOPY, WITH POLYPECTOMY AND BIOPSY;  Surgeon: Urszula Vides MD;  [facility]     COLONOSCOPY N/A 3/9/2020    Procedure: COLONOSCOPY, WITH POLYPECTOMY AND BIOPSY;  Surgeon: Urszula Vides MD;  [facility]     ESOPHAGOSCOPY, GASTROSCOPY, DUODENOSCOPY (EGD), COMBINED N/A 6/18/2019    Procedure: ESOPHAGOGASTRODUODENOSCOPY, WITH BIOPSY;  Surgeon: Urszula Vides MD;  [facility]     GENITOURINARY SURGERY       NO SURGICAL HISTORY[         Family History:    Family History   Problem Relation Age of Onset     Other - See  Comments Mother      Diabetes Maternal Grandfather      Heart Disease Paternal Grandfather         MI     Diabetes Other      Colon Cancer No family hx of        Social History:  Marital Status:   [2]  Social History     Tobacco Use     Smoking status: Light Tobacco Smoker     Packs/day: 0.25     Years: 33.00     Pack years: 8.25     Types: Cigarettes     Start date: 1/1/1987     Smokeless tobacco: Never Used   Substance Use Topics     Alcohol use: Yes     Comment: occasionally     Drug use: No        Medications:    ketorolac (TORADOL) 10 MG tablet  Acetaminophen (TYLENOL PO)  albuterol (PROAIR HFA/PROVENTIL HFA/VENTOLIN HFA) 108 (90 Base) MCG/ACT inhaler  ALPRAZolam (XANAX) 0.5 MG tablet  budesonide (ENTOCORT EC) 3 MG EC capsule  cetirizine (ZYRTEC) 10 MG tablet  fluticasone (FLONASE) 50 MCG/ACT nasal spray  HYDROcodone-acetaminophen (NORCO) 5-325 MG tablet  ondansetron (ZOFRAN-ODT) 4 MG ODT tab  traZODone (DESYREL) 50 MG tablet  valACYclovir (VALTREX) 1000 mg tablet          Review of Systems   Musculoskeletal:        Right medial and lateral elbow pain   All other systems reviewed and are negative.      Physical Exam   BP: 173/99  Pulse: 86  Temp: 98.6  F (37  C)  Resp: 20  SpO2: 99 %      Physical Exam  Vitals signs and nursing note reviewed.   Constitutional:       Appearance: Normal appearance.      Comments: Appears uncomfortable with the right elbow pain.   Musculoskeletal:      Comments: There is no obvious deformity, swelling, erythema, ecchymosis.  CMS and range of motion are intact.  There is pain with palpation medial and lateral to the olecranon more so in the joint/ligament area.   Neurological:      Mental Status: He is alert.         ED Course        Procedures    Results for orders placed or performed during the hospital encounter of 06/27/20 (from the past 24 hour(s))   XR Elbow Right 2 Views    Narrative    PROCEDURE:  XR ELBOW RT 2 VW    HISTORY: trauma.    COMPARISON:   None.    TECHNIQUE:  2 views right elbow.    FINDINGS:  No fracture or dislocation is identified. No joint effusion  is seen. The joint spaces are preserved. No foreign body is seen.       Impression    IMPRESSION: No acute fracture.      GWEN JONES MD       Medications   ketorolac (TORADOL) injection 30 mg (30 mg Intramuscular Given 6/27/20 1931)       Assessments & Plan (with Medical Decision Making)   Assessment:  The patient is a 45-year-old male who sustained a right elbow injury today while he was lifting a patient at work.  He had noticed a pop and experienced immediate pain.  Pain is experienced at the medial and lateral olecranon area more so at the joint/ligament area.  He did receive Toradol 30 mg IM, which was effective in relieving some of his pain.  CMS range of motion remained intact.  X-ray showed no acute fracture dislocation.    Plan:  1) Patient is requesting an MRI, which I agree with to evaluate possible tear.  An order was placed for this to be complete.  Recommend rest, ice, compression, elevation.  Tylenol and ibuprofen over-the-counter is recommended.  He is given a workability report for place of employment, which included no heavy lifting or pulling or repetitive action of his right elbow.    Instructed to follow up with PCP in 5-7 days, and to seek medical care sooner with any new or worsening symptoms.    Patient agrees with plan of care and verbalizes understanding.       I have reviewed the nursing notes.  I have reviewed the findings, diagnosis, plan and need for follow up with the patient.    Discharge Medication List as of 6/27/2020  7:36 PM      START taking these medications    Details   ketorolac (TORADOL) 10 MG tablet Take 1 tablet (10 mg) by mouth every 6 hours as needed for moderate pain, Disp-20 tablet,R-0, E-Prescribe             Final diagnoses:   Elbow strain, right, initial encounter       6/27/2020   [facility]     Arielle Bee, APRN  CNP  06/28/20 [id number]

## 2020-06-28 NOTE — DISCHARGE INSTRUCTIONS
MRI of elbow has been ordered. You should get a call early next week to schedule.     Ibuprofen 600 to 800 mg every six to eight hours as needed; do not take first dose until 0330 tomorrow morning     Tylenol 650 to 1000 mg every four to six hours as needed (not to exceed more than 4000 mg in a 24 hour period).     May use interchangeably. Suggest medicating around the clock for the next 24-48 hours.    Rest, ice, compression, elevation.    Seek immediate medical care with any new or worsening symptoms.

## 2020-07-01 ENCOUNTER — VIRTUAL VISIT (OUTPATIENT)
Dept: FAMILY MEDICINE | Facility: OTHER | Age: 45
End: 2020-07-01
Attending: NURSE PRACTITIONER
Payer: OTHER MISCELLANEOUS

## 2020-07-01 ENCOUNTER — TELEPHONE (OUTPATIENT)
Dept: FAMILY MEDICINE | Facility: OTHER | Age: 45
End: 2020-07-01

## 2020-07-01 VITALS
SYSTOLIC BLOOD PRESSURE: 138 MMHG | HEIGHT: 72 IN | TEMPERATURE: 99 F | WEIGHT: 185 LBS | BODY MASS INDEX: 25.06 KG/M2 | OXYGEN SATURATION: 98 % | RESPIRATION RATE: 18 BRPM | HEART RATE: 100 BPM | DIASTOLIC BLOOD PRESSURE: 66 MMHG

## 2020-07-01 DIAGNOSIS — M25.521 RIGHT ELBOW PAIN: ICD-10-CM

## 2020-07-01 DIAGNOSIS — S59.901D INJURY OF RIGHT ELBOW, SUBSEQUENT ENCOUNTER: Primary | ICD-10-CM

## 2020-07-01 DIAGNOSIS — Z02.6 ENCOUNTER RELATED TO WORKER'S COMPENSATION CLAIM: ICD-10-CM

## 2020-07-01 PROCEDURE — 99214 OFFICE O/P EST MOD 30 MIN: CPT | Performed by: NURSE PRACTITIONER

## 2020-07-01 ASSESSMENT — MIFFLIN-ST. JEOR: SCORE: 1752.15

## 2020-07-01 ASSESSMENT — PAIN SCALES - GENERAL: PAINLEVEL: SEVERE PAIN (6)

## 2020-07-01 NOTE — PROGRESS NOTES
Occupational Visit     SUBJECTIVE:  Jese Joe, 45 year old, male is seen for new evaluation and treatment of occupational injury. Date of injury is 06/27/2020. Initially, this was scheduled as a virtual visit. However, due to the nature of the injury, it was requested that he come into the clinic which he was able to do.     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 He heard a pop and felt immediate pain to his right elbow of pain at 8-10. He was seen in urgent care immediately and given Toradol which helped.Since then the pain has been increasing and the joint seems to be getting stiff.     Description  Location: right elbow    Intensity:  6/10    Accompanying signs and symptoms: radiation of pain to hand and swelling    History  Previous similar problem: no   Previous evaluation:  x-ray    Precipitating or alleviating factors:  Trauma or overuse: YES- pulling someone onto ER cot and felt pop in elbow  Aggravating factors include: lifting and unable to straighten arm    Therapies tried and outcome: ice, support wrap, acetaminophen and Toradol       Allergies   Allergen Reactions     Cleocin Hives     Review of Systems:  Constitutional, HEENT, cardiovascular, pulmonary, gi and gu systems are negative, except as otherwise noted.      OBJECTIVE:  Vitals:    07/01/20 1406   BP: 138/66   Pulse: 100   Resp: 18   Temp: 99  F (37.2  C)   SpO2: 98%     Exam:  GENERAL: healthy, alert and no distress  RESP: lungs clear to auscultation - no rales, no rhonchi, no wheezes  CV: regular rates and rhythm, normal S1 S2, no S3 or S4 and no murmur, no click or rub -  Elbow Exam: Inspection: swelling, no ecchymosis, no  olecranon bursa swelling, no distal bicep tendon defect  Tender: lateral epicondyle and common extensor tendon  Range of Motion: flexion: normal, extension: normal, supination:  normal, pronation: normal  Strength: flexion: limited bypainful  and extension: limited by pain.  strength fully intact R=L.  SKIN: no suspicious lesions, no rashes  NEURO:  sensory exam- grossly normal, mentation- intact, speech- normal    Labs: No results found for this or any previous visit (from the past 24 hour(s)).      ASSESSMENT/PLAN:  1. Encounter related to worker's compensation claim  - MR Elbow Right w/o Contrast; Future  - ORTHOPEDIC ADULT REFERRAL; Future    2. Injury of right elbow, subsequent encounter  - MR Elbow Right w/o Contrast; Future  - ORTHOPEDIC ADULT REFERRAL; Future    3. Right elbow pain  - MR Elbow Right w/o Contrast; Future  - ORTHOPEDIC ADULT REFERRAL; Future    Patient should remain out of work until evaluated by orthopedic provider and  MRI results are evaluated.     Nayeli Garnica, APRN, CNP

## 2020-07-01 NOTE — NURSING NOTE
Chief Complaint   Patient presents with     Work Comp       Initial /66 (BP Location: Left arm, Patient Position: Sitting, Cuff Size: Adult Regular)   Pulse 100   Temp 99  F (37.2  C) (Oral)   Resp 18   Ht 1.829 m (6')   Wt 83.9 kg (185 lb)   SpO2 98%   BMI 25.09 kg/m   Estimated body mass index is 25.09 kg/m  as calculated from the following:    Height as of this encounter: 1.829 m (6').    Weight as of this encounter: 83.9 kg (185 lb).  Medication Reconciliation: complete  Berenice Johnson LPN

## 2020-07-01 NOTE — PATIENT INSTRUCTIONS
"  Patient Education     Sling    A sling is designed to support your arm in a position of rest. It is used for injuries of the hand, forearm, upper arm, and shoulder.  A shoulder that is immobilized too long can become stiff and lose range of motion. Your elbow can also get stiff. Follow up with your healthcare provider as advised and don't use the sling longer than directed.    Home use:    Leave the sling in place as long as directed by your healthcare provider. Unless told otherwise, you may remove it when bathing, dressing, and when you go to sleep.    If approved by your healthcare provider, you can do gentle \"pendulum exercises.\" To do these:  ? Remove your sling.  ? Stand or sit with your arm vertical and close to your side.  ? Relax your shoulder muscles and gently swing the arm forward and back, side to side, and in small circles.  ? Do this for about 5 minutes once or twice a day.  There should be only minimal pain with this exercise. If you have more than minimal discomfort, stop the exercise and call your healthcare provider.    The sling is adjustable. If it becomes loose, adjust it so that your forearm is horizontal (level with the ground). Your hand should be level with the elbow.  Date Last Reviewed: 5/1/2018 2000-2019 [employer]. [address] [place] [address], [place] [address]. All rights reserved. This information is not intended as a substitute for professional medical care. Always follow your healthcare professional's instructions.           "

## 2020-07-02 ENCOUNTER — TELEPHONE (OUTPATIENT)
Dept: FAMILY MEDICINE | Facility: OTHER | Age: 45
End: 2020-07-02

## 2020-07-02 NOTE — TELEPHONE ENCOUNTER
Please advise if workability was done on patient. Need to get it faxed over to Oneyda Sahni. Thank you

## 2020-07-02 NOTE — TELEPHONE ENCOUNTER
11:47 AM    Reason for Call: Phone Call    Description: Pt called stating he needs Nayeli Garnica to do his workability paperwork and send it to Oneyda Sahni. Please call pt back and let him know the status of this. Thank you.    Was an appointment offered for this call? No  If yes : Appointment type              Date    Preferred method for responding to this message: Telephone Call  What is your phone number ? [phone]    If we cannot reach you directly, may we leave a detailed response at the number you provided? Yes    Can this message wait until your PCP/provider returns, if available today? Alma Martinez

## 2020-07-02 NOTE — TELEPHONE ENCOUNTER
Given to Kush in Office for faxing earlier. Please notify Jese that I did also put in a referral to [facility].

## 2020-07-14 ENCOUNTER — PATIENT OUTREACH (OUTPATIENT)
Dept: GASTROENTEROLOGY | Facility: CLINIC | Age: 45
End: 2020-07-14

## 2020-07-14 DIAGNOSIS — K50.90 CROHN'S DISEASE (H): Primary | ICD-10-CM

## 2020-07-14 NOTE — PROGRESS NOTES
FCP ordered for the patient and stool collection kit sent to the patient home. Patient notified per My Chart

## 2020-07-16 ENCOUNTER — HOSPITAL ENCOUNTER (OUTPATIENT)
Dept: MRI IMAGING | Facility: HOSPITAL | Age: 45
Discharge: HOME | End: 2020-07-16
Attending: NURSE PRACTITIONER | Admitting: NURSE PRACTITIONER
Payer: OTHER MISCELLANEOUS

## 2020-07-16 DIAGNOSIS — M25.521 RIGHT ELBOW PAIN: ICD-10-CM

## 2020-07-16 DIAGNOSIS — Z02.6 ENCOUNTER RELATED TO WORKER'S COMPENSATION CLAIM: ICD-10-CM

## 2020-07-16 DIAGNOSIS — S59.901D INJURY OF RIGHT ELBOW, SUBSEQUENT ENCOUNTER: ICD-10-CM

## 2020-07-16 PROCEDURE — 73221 MRI JOINT UPR EXTREM W/O DYE: CPT | Mod: TC,RT

## 2020-07-16 NOTE — RESULT ENCOUNTER NOTE
Please notify patient of results. If he has not heard yet from Orthopedics to schedule, please let me know.

## 2020-07-21 NOTE — PROGRESS NOTES
"Occupational Visit     SUBJECTIVE:  Jese Joe, 45 year old, male is seen for follow up of occupational injury. Date of injury is 6-.    Linked Episodes   Type: Episode: Status: Noted: Resolved: Last update: Updated by:   WORK COMP Warren Range (emergency dept) Active 6/27/2020 7/21/2020 12:12 PM Berenice Johnson LPN      Comments:       Musculoskeletal problem/pain      Duration: 6-    Description  Location: right elbow    Intensity:  moderate, severe    Accompanying signs and symptoms: radiation of pain to right hand and numbness    History  Previous similar problem: no   Previous evaluation:  x-ray and MRI    Precipitating or alleviating factors:  Trauma or overuse: YES- work comp  Aggravating factors include: bending, movement and no strength    Therapies tried and outcome: heat, ice, acetaminophen and Ibuprofen        Allergies   Allergen Reactions     Cleocin Hives         Review of Systems:  Constitutional, HEENT, cardiovascular, pulmonary, gi and gu systems are negative, except as otherwise noted.      OBJECTIVE:  /72   Pulse 78   Temp 97.1  F (36.2  C) (Tympanic)   Ht 1.8 m (5' 10.87\")   Wt 85.3 kg (188 lb 2 oz)   SpO2 98%   BMI 26.34 kg/m              Exam:  Holding elbow slightly flexed on the right.  Tender distal to the lateral epicondyle.        Labs: No results found for this or any previous visit (from the past 24 hour(s)).      ASSESSMENT/PLAN:  (M77.11) Lateral epicondylitis of right elbow  (primary encounter diagnosis)  Comment: reviewed.  With partial tendon tear work injury.    Plan: f/u with ortho as scheduled.  May need prolonged immobilization I think.  Note for no use of right arm/hand until cleared by ortho.  F/u routine.  This one will be followed by ortho.        "

## 2020-07-22 ENCOUNTER — OFFICE VISIT (OUTPATIENT)
Dept: FAMILY MEDICINE | Facility: OTHER | Age: 45
End: 2020-07-22
Attending: FAMILY MEDICINE
Payer: OTHER MISCELLANEOUS

## 2020-07-22 VITALS
WEIGHT: 188.13 LBS | HEIGHT: 71 IN | BODY MASS INDEX: 26.34 KG/M2 | DIASTOLIC BLOOD PRESSURE: 72 MMHG | TEMPERATURE: 97.1 F | HEART RATE: 78 BPM | OXYGEN SATURATION: 98 % | SYSTOLIC BLOOD PRESSURE: 118 MMHG

## 2020-07-22 DIAGNOSIS — M77.11 LATERAL EPICONDYLITIS OF RIGHT ELBOW: Primary | ICD-10-CM

## 2020-07-22 PROCEDURE — 99213 OFFICE O/P EST LOW 20 MIN: CPT | Performed by: FAMILY MEDICINE

## 2020-07-22 ASSESSMENT — PAIN SCALES - GENERAL: PAINLEVEL: MODERATE PAIN (5)

## 2020-07-22 ASSESSMENT — MIFFLIN-ST. JEOR: SCORE: 1748.33

## 2020-07-22 NOTE — NURSING NOTE
"Chief Complaint   Patient presents with     Work Comp       Initial /72   Pulse 78   Temp 97.1  F (36.2  C) (Tympanic)   Ht 1.8 m (5' 10.87\")   Wt 85.3 kg (188 lb 2 oz)   SpO2 98%   BMI 26.34 kg/m   Estimated body mass index is 26.34 kg/m  as calculated from the following:    Height as of this encounter: 1.8 m (5' 10.87\").    Weight as of this encounter: 85.3 kg (188 lb 2 oz).  Medication Reconciliation: complete  Rochelle Doyle MA  "

## 2020-07-22 NOTE — PROGRESS NOTES
Physical Therapy Discharge Note      Name: Jese Joe MRN# [id number]   Age: 45 year old YOB: 1975        Requesting provider: Dr. Philip  Diagnosis: Neck Pain, Chronic Pain of both shoulders  Prescription: Evaluate and treat  Frequency/duration: Therapists discretion  Services provided: The patient was seen for a total of 1 visits from 10/4/19 to 10/8/19.  See the previous documentation for treatment details.        Goals:     1.) Patient will understand biomechanical stressors of the shoulder joint in order to make modifications to activities to reduce further risk of injury.  2.) Patient will be independent with a short-term home exercise program.  3.) Patient will report 25% improvement of overall symptoms to allow decreased shoulder pain with daily movement and activity.  4.) Patient will be able to reach overhead repetitively to L shoulder end range w/o increase in pain.         Long-term goals:  To be achieved in 8 weeks:  Self management of symptoms  Pain free activities of daily living  Return to previous level of function     1.) Improve score on Shoulder Pain and Disability Index by 13 points to correlate with clinically significant change.  2.) Patient will report 50% improvement of overall symptoms to allow decreased shoulder pain with daily movement and activity  3.) Patient will be able to complete a full 12 hour work shift w/o increase in burning shoulder pain.   4.) Patient will be able to push and pull weights up to 30# and lift his canoe and gear in order to perform work tasks and recreational activities   Plan  Intervention:  The patient has not contacted the department since the previous visit.  For additional information regarding goals and status as of last, please refer to prior documentation.  It is uncertain if the patient has attained any further goals at this time.  The patient will be formally discharged from physical therapy care.  We will resume physical  therapy services as per physician referral and discretion.     Follow up:  Recheck with physician as needed

## 2020-07-23 ENCOUNTER — TRANSFERRED RECORDS (OUTPATIENT)
Dept: HEALTH INFORMATION MANAGEMENT | Facility: CLINIC | Age: 45
End: 2020-07-23

## 2020-08-10 NOTE — PROGRESS NOTES
Outpatient Physical Therapy Discharge Note     Patient: Jese Joe  : 1975    Beginning/End Dates of Reporting Period:  3/19/20 to 3/19/2020    Referring Provider: Dr. Philip    Therapy Diagnosis: L shoulder pain      Client Self Report: See eval.     Assessment: Patient did not return after eval. Unable to determine progress.     Goals:  Short-term goals:  To be achieved in 2-3 weeks:  Instruct in home program     1.) Patient will understand biomechanical stressors of the shoulder joint in order to make modifications to activities to reduce further risk of injury.  2.) Patient will be independent with a short-term home exercise program.  3.) Patient will report 50% improvement of overall symptoms to allow decreased shoulder pain with daily movement and activity.        Long-term goals:  To be achieved in 6-8 weeks:  Self management of symptoms  Pain free activities of daily living  Return to previous level of function     1.) Patient will report 80% improvement of overall symptoms to allow decreased shoulder pain with daily movement and activity  2.) Patient will be able to perform full functional L UE use without limitation from pain or functional weakness including essential job duties including lifting, reaching, carrying.     Plan:  Discharge from therapy.    Discharge:    Reason for Discharge: Patient has failed to schedule further appointments.    Equipment Issued: HEP    Discharge Plan: Patient to continue home program.

## 2020-08-18 DIAGNOSIS — K50.90 CROHN'S DISEASE (H): ICD-10-CM

## 2020-08-18 DIAGNOSIS — R30.0 DYSURIA: Primary | ICD-10-CM

## 2020-08-18 PROCEDURE — 83993 ASSAY FOR CALPROTECTIN FECAL: CPT | Performed by: INTERNAL MEDICINE

## 2020-08-21 LAB — CALPROTECTIN STL-MCNT: 35 MG/KG (ref 0–49.9)

## 2020-09-08 DIAGNOSIS — R30.0 DYSURIA: ICD-10-CM

## 2020-09-08 LAB
C TRACH DNA SPEC QL NAA+PROBE: NOT DETECTED
N GONORRHOEA DNA SPEC QL NAA+PROBE: NOT DETECTED
SPECIMEN SOURCE: NORMAL

## 2020-09-08 PROCEDURE — 87591 N.GONORRHOEAE DNA AMP PROB: CPT | Performed by: PHYSICIAN ASSISTANT

## 2020-09-08 PROCEDURE — 87491 CHLMYD TRACH DNA AMP PROBE: CPT | Performed by: PHYSICIAN ASSISTANT

## 2020-09-10 DIAGNOSIS — M54.2 NECK PAIN: ICD-10-CM

## 2020-09-10 DIAGNOSIS — F41.9 ANXIETY: ICD-10-CM

## 2020-09-10 RX ORDER — HYDROCODONE BITARTRATE AND ACETAMINOPHEN 5; 325 MG/1; MG/1
TABLET ORAL
Qty: 40 TABLET | Refills: 0 | Status: SHIPPED | OUTPATIENT
Start: 2020-09-10 | End: 2021-02-18

## 2020-09-10 RX ORDER — ALPRAZOLAM 0.5 MG
TABLET ORAL
Qty: 20 TABLET | Refills: 0 | Status: SHIPPED | OUTPATIENT
Start: 2020-09-10 | End: 2021-02-18

## 2020-09-17 DIAGNOSIS — H91.93 DECREASED HEARING OF BOTH EARS: Primary | ICD-10-CM

## 2020-09-18 ENCOUNTER — OFFICE VISIT (OUTPATIENT)
Dept: AUDIOLOGY | Facility: OTHER | Age: 45
End: 2020-09-18
Attending: AUDIOLOGIST
Payer: COMMERCIAL

## 2020-09-18 DIAGNOSIS — H90.3 SENSORINEURAL HEARING LOSS, ASYMMETRICAL: Primary | ICD-10-CM

## 2020-09-18 DIAGNOSIS — H91.93 DECREASED HEARING OF BOTH EARS: ICD-10-CM

## 2020-09-18 DIAGNOSIS — H93.13 TINNITUS, BILATERAL: ICD-10-CM

## 2020-09-18 PROCEDURE — 92550 TYMPANOMETRY & REFLEX THRESH: CPT | Performed by: AUDIOLOGIST

## 2020-09-18 PROCEDURE — 92557 COMPREHENSIVE HEARING TEST: CPT | Performed by: AUDIOLOGIST

## 2020-10-23 NOTE — TELEPHONE ENCOUNTER
Reason for call:  REFERRAL   1. Concern: requesting sleep study referral to see sleep specialist at the [facility]  2. Have you seen a provider for this concern? No  3. Who? Dr Philip  4. When? n/a  5. What services are you requesting?  requesting sleep study referral to see sleep specialist at the [facility]  Description: Pt states he has a hard time sleeping for as long as he can remember and would like a sleep study done.  Was an appointment offered for this a call? No  If Yes:  Appointment type                Date    Preferred method for responding to this message: Telephone Call  What is your phone number ? [phone]    If we cannot reach you directly, may we leave a detailed response at the number you provided? Yes  Can this message wait until your PCP/Provider returns if not available today? No,    [No Acute Distress] : no acute distress [Well Nourished] : well nourished [Well Developed] : well developed [Normal Voice/Communication] : normal voice communication [EOMI] : extra ocular movement intact [Normal Oropharynx] : the oropharynx was normal [No JVD] : no jugular venous distention [Supple] : the neck was supple [No LAD] : no lymphadenopathy [No Respiratory Distress] : no respiratory distress [No Accessory Muscle Use] : no accessory muscle use [Non Tender] : non-tender [Soft] : abdomen soft [Not Distended] : not distended [No CVA Tenderness] : no ~M costovertebral angle tenderness [Kyphosis] :  kyphosis present [No Joint Swelling] : no joint swelling seen [No Rash] : no rash [No Skin Lesions] : no skin lesions [Cranial Nerves Intact] : cranial nerves 2-12 were intact [No Motor Deficits] : the motor exam was normal [No Gross Sensory Deficits] : no gross sensory deficits [Normal Affect] : the affect was normal [Normal Mood] : the mood was normal [Clear to Auscultation] : lungs were clear to auscultation bilaterally [Normal Rate] : heart rate was normal  [Regular Rhythm] : with a regular rhythm [Normal S1, S2] : normal S1 and S2 [No Murmurs] : no murmurs heard [Normal Appearance] : normal in appearance [Normal Bowel Sounds] : normal bowel sounds [de-identified] : pleasant, calm, well dressed [de-identified] : b/l lymphedema, 2+ nonpitting swelling- chronic  stable [de-identified] : unsteady gait, uses walker, no paravertebral or CVA tenderness, [de-identified] : chronic lymphedema bilateral,  [de-identified] : oriented to place, person, recent memory is affected, remote intact

## 2020-11-21 DIAGNOSIS — J98.01 BRONCHOSPASM: ICD-10-CM

## 2020-11-23 RX ORDER — FLUTICASONE PROPIONATE 50 MCG
SPRAY, SUSPENSION (ML) NASAL
Qty: 16 G | Refills: 11 | Status: SHIPPED | OUTPATIENT
Start: 2020-11-23 | End: 2021-12-01

## 2020-11-23 NOTE — TELEPHONE ENCOUNTER
flonase      Last Written Prescription Date:  2/11/20  Last Fill Quantity: 15.8,   # refills: 3  Last Office Visit: 7/22/0  Future Office visit:

## 2020-12-14 ENCOUNTER — OFFICE VISIT (OUTPATIENT)
Dept: CHIROPRACTIC MEDICINE | Facility: OTHER | Age: 45
End: 2020-12-14
Attending: CHIROPRACTOR
Payer: COMMERCIAL

## 2020-12-14 DIAGNOSIS — M99.02 SEGMENTAL AND SOMATIC DYSFUNCTION OF THORACIC REGION: ICD-10-CM

## 2020-12-14 DIAGNOSIS — M54.2 CERVICALGIA: ICD-10-CM

## 2020-12-14 DIAGNOSIS — M99.03 SEGMENTAL AND SOMATIC DYSFUNCTION OF LUMBAR REGION: ICD-10-CM

## 2020-12-14 DIAGNOSIS — M99.01 SEGMENTAL AND SOMATIC DYSFUNCTION OF CERVICAL REGION: Primary | ICD-10-CM

## 2020-12-14 PROCEDURE — 98941 CHIROPRACT MANJ 3-4 REGIONS: CPT | Mod: AT | Performed by: CHIROPRACTOR

## 2020-12-20 ENCOUNTER — HEALTH MAINTENANCE LETTER (OUTPATIENT)
Age: 45
End: 2020-12-20

## 2021-01-25 ENCOUNTER — RESULTS ONLY (OUTPATIENT)
Dept: LAB | Age: 46
End: 2021-01-25

## 2021-01-26 LAB
LABORATORY COMMENT REPORT: NORMAL
SARS-COV-2 RNA RESP QL NAA+PROBE: NEGATIVE
SPECIMEN SOURCE: NORMAL

## 2021-02-18 ENCOUNTER — APPOINTMENT (OUTPATIENT)
Dept: CHIROPRACTIC MEDICINE | Facility: OTHER | Age: 46
End: 2021-02-18
Attending: CHIROPRACTOR
Payer: COMMERCIAL

## 2021-02-18 DIAGNOSIS — M54.2 NECK PAIN: ICD-10-CM

## 2021-02-18 DIAGNOSIS — F41.9 ANXIETY: ICD-10-CM

## 2021-02-18 RX ORDER — ALPRAZOLAM 0.5 MG
TABLET ORAL
Qty: 20 TABLET | Refills: 0 | Status: SHIPPED | OUTPATIENT
Start: 2021-02-18 | End: 2021-09-07

## 2021-02-18 RX ORDER — HYDROCODONE BITARTRATE AND ACETAMINOPHEN 5; 325 MG/1; MG/1
TABLET ORAL
Qty: 40 TABLET | Refills: 0 | Status: SHIPPED | OUTPATIENT
Start: 2021-02-18 | End: 2021-09-07

## 2021-02-18 NOTE — TELEPHONE ENCOUNTER
hydrocodone      Last Written Prescription Date:  9/10/20  Last Fill Quantity: 40,   # refills: 0  Last Office Visit: 7/22/20  Future Office visit:    Next 5 appointments (look out 90 days)    Feb 18, 2021  4:40 PM  Return Visit with Mosiés Judd DC  [facility] ([facility] ) [address] [place] [address]  [place] [address]  [phone]           Routing refill request to provider for review/approval because:  Drug not on the [facility], [facility] or [facility] refill protocol or controlled substance  xanx      Last Written Prescription Date:  9/10/20  Last Fill Quantity: 20,   # refills: 0

## 2021-02-26 ENCOUNTER — DOCUMENTATION ONLY (OUTPATIENT)
Dept: SLEEP MEDICINE | Facility: HOSPITAL | Age: 46
End: 2021-02-26

## 2021-02-26 DIAGNOSIS — R06.83 SNORING: Primary | ICD-10-CM

## 2021-02-26 DIAGNOSIS — R53.82 CHRONIC FATIGUE: ICD-10-CM

## 2021-02-26 DIAGNOSIS — R06.81 APNEA: ICD-10-CM

## 2021-02-26 NOTE — PROGRESS NOTES
"Chart review prior to sleep testing.    Patient Summary:  46 year old yo male who is referred for concern for sleep-disordered breathing.    Patient Active Problem List    Diagnosis Date Noted     Chronic, continuous use of opioids 09/23/2019     Priority: Medium     SBO (small bowel obstruction) (H) 03/26/2019     Priority: Medium     ACP (advance care planning) 07/31/2017     Priority: Medium     Advance Care Planning 7/31/2017: ACP Review of Chart / Resources Provided:  Reviewed chart for advance care plan.  Jese STARKS Bcsahilrj has no plan or code status on file. Discussed available resources and provided with information.   Added by Indira Santoro                 Current Outpatient Medications   Medication     Acetaminophen (TYLENOL PO)     albuterol (PROAIR HFA/PROVENTIL HFA/VENTOLIN HFA) 108 (90 Base) MCG/ACT inhaler     ALPRAZolam (XANAX) 0.5 MG tablet     budesonide (ENTOCORT EC) 3 MG EC capsule     cetirizine (ZYRTEC) 10 MG tablet     fluticasone (FLONASE) 50 MCG/ACT nasal spray     HYDROcodone-acetaminophen (NORCO) 5-325 MG tablet     ketorolac (TORADOL) 10 MG tablet     ondansetron (ZOFRAN-ODT) 4 MG ODT tab     valACYclovir (VALTREX) 1000 mg tablet     No current facility-administered medications for this visit.        STOP-BANG score of 3, with unknown neck circumference.  Townsend score of 17.  BMI of Estimated body mass index is 26.34 kg/m  as calculated from the following:    Height as of 7/22/20: 1.8 m (5' 10.87\").    Weight as of 7/22/20: 85.3 kg (188 lb 2 oz).     Per questionnaire: \"snoring and up multiple times a night\"    Caffeine use:  Yes for 3+ per day.  No for within 6 hours of bed.    Tobacco use: No    Sleep pattern:  Workdays.  9pm - 6am, total sleep time 6 hours.  Weekends.  10pm - 7am, total sleep time 7 hours.  Time to fall asleep: ~30 minutes.  Awakenings: 3 times per night, 10 minutes to return to sleep.  Napping.  2 days per week, 0.5 hours per nap.    No for RLS screen.  No for sleep " walking.  No for dream enactment behavior.  Yes for bruxism.    No for morning headaches.  Yes for snoring.  No for observed apnea.  Yes for FHx of ILAN.    SHx:  , works as RN at [employer].    A/P:  1.)  Borderline increased likelihood of ILAN with STOP-BANG score of 3.   - Would appear to be candidate for either home sleep testing or in-lab PSG.    ---  This note was written with the assistance of the Dragon voice-dictation technology software. The final document, although reviewed, may contain errors. For corrections, please contact the office.    Elvis Mc MD    Sleep Medicine  [facility]  ([phone])  [facility]  ([phone])
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
## 2021-02-26 NOTE — PROGRESS NOTES
STOP BANG       Name: Jese Joe MRN# [id number]   Age: 46 year old YOB: 1975     Stop Bang questionnaire completed with a score of >3 to allow for HST     Have you been told you snore loudly (louder than talking or loud enough to be heard through doors)? YES    Do you often feel tired, fatigued, or sleepy during the daytime? YES    Has anyone observed you stop breathing during your sleep? NO    Do you have or are you being treated for high blood pressure? NO    Is your BMI greater than 35? NO    Is your neck size circumference 16 inches or greater? NO    Are you over 50 years old? NO    Stop Bang Score (# of yes): 3

## 2021-03-15 ENCOUNTER — OFFICE VISIT (OUTPATIENT)
Dept: SLEEP MEDICINE | Facility: HOSPITAL | Age: 46
End: 2021-03-15
Attending: FAMILY MEDICINE
Payer: COMMERCIAL

## 2021-03-15 DIAGNOSIS — G47.33 OSA (OBSTRUCTIVE SLEEP APNEA): Primary | ICD-10-CM

## 2021-03-16 ENCOUNTER — DOCUMENTATION ONLY (OUTPATIENT)
Dept: SLEEP MEDICINE | Facility: HOSPITAL | Age: 46
End: 2021-03-16
Attending: FAMILY MEDICINE
Payer: COMMERCIAL

## 2021-03-16 DIAGNOSIS — R53.82 CHRONIC FATIGUE: ICD-10-CM

## 2021-03-16 DIAGNOSIS — R06.81 APNEA: ICD-10-CM

## 2021-03-16 DIAGNOSIS — R06.83 SNORING: ICD-10-CM

## 2021-03-16 PROCEDURE — G0399 HOME SLEEP TEST/TYPE 3 PORTA: HCPCS

## 2021-03-16 PROCEDURE — G0399 HOME SLEEP TEST/TYPE 3 PORTA: HCPCS | Mod: 26

## 2021-03-16 NOTE — PROGRESS NOTES
This HSAT was performed using a Noxturnal T3 device which recorded snore, sound, movement activity, body position, nasal pressure, oronasal thermal airflow, pulse, oximetry and both chest and abdominal respiratory effort. HSAT data was restricted to the time patient states they were in bed.     HSAT was scored using 1B 4% hypopnea rule.     HST AHI (Non-PAT): 8.5     Snoring was reported as moderate.  Time with SpO2 below 89% was 7.2 minutes.   Overall signal quality was good     Pt will follow up with sleep provider to determine appropriate therapy. Patient tolerated test well

## 2021-03-22 NOTE — PROCEDURES
"HOME SLEEP STUDY INTERPRETATION    Patient: Jese Joe  MRN: [id number]  YOB: 1975  Study Date: 3/15/2021  Referring Provider: Andrei Philip  Ordering Provider: Elvis Mc MD     Indications for Home Study: Jese Joe is a 46 year old male with a history of chronic opiate use who presents with symptoms suggestive of obstructive sleep apnea.    Estimated body mass index is 26.34 kg/m  as calculated from the following:    Height as of 7/22/20: 1.8 m (5' 10.87\").    Weight as of 7/22/20: 85.3 kg (188 lb 2 oz).  [place] Sleepiness Scale: 17/24  STOP-BANG: 3/8    Data: A full night home sleep study was performed recording the standard physiologic parameters including body position, movement, sound, nasal pressure, thermal oral airflow, chest and abdominal movements with respiratory inductance plethysmography, and oxygen saturation by pulse oximetry. Pulse rate was estimated by oximetry recording. This study was considered adequate based on > 4 hours of quality oximetry and respiratory recording. As specified by the AASM Manual for the Scoring of Sleep and Associated events, version 2.3, Rule VIII.D 1B, 4% oxygen desaturation scoring for hypopneas is used as a standard of care on all home sleep apnea testing.    Analysis Time:  480.3 minutes    Respiration:   Sleep Associated Hypoxemia: sustained hypoxemia was not present. Baseline oxygen saturation was 94.3%.  Time with saturation less than or equal to 88% was 7.2 minutes. The lowest oxygen saturation was 81%.   Snoring: Snoring was present.  Respiratory events: The home study revealed a presence of 56 obstructive apneas and 1 mixed and central apneas. There were 11 hypopneas resulting in a combined apnea/hypopnea index [AHI] of 8.5 events per hour.  AHI was 12.5 per hour supine, - per hour prone, 1.6 per hour on left side, and 0 per hour on right side.   Pattern: Excluding events noted above, respiratory rate and pattern was " Normal.    Position: Percent of time spent: supine - 65.9%, prone - 0%, on left - 15.7%, on right - 18.4%.    Heart Rate: By pulse oximetry normal rate was noted.     Assessment:   Mild obstructive sleep apnea.  Sleep associated hypoxemia was present.  Noted positional component.    Recommendations:  Consider auto-CPAP at 5-15 cmH2O, oral appliance therapy, positional therapy or polysomnography with full night PAP titration.  Suggest optimizing sleep hygiene and avoiding sleep deprivation.  Weight management.    Diagnosis Code(s): Obstructive Sleep Apnea G47.33, Hypoxemia G47.36    Elvis Mc MD, MD, March 22, 2021   Diplomate, American Board of Family Medicine, Sleep Medicine

## 2021-03-30 ENCOUNTER — VIRTUAL VISIT (OUTPATIENT)
Dept: SLEEP MEDICINE | Facility: HOSPITAL | Age: 46
End: 2021-03-30
Attending: FAMILY MEDICINE
Payer: COMMERCIAL

## 2021-03-30 DIAGNOSIS — G47.33 OSA (OBSTRUCTIVE SLEEP APNEA): ICD-10-CM

## 2021-03-30 DIAGNOSIS — R06.83 SNORING: Primary | ICD-10-CM

## 2021-03-30 DIAGNOSIS — R53.82 CHRONIC FATIGUE: ICD-10-CM

## 2021-03-30 PROCEDURE — 99203 OFFICE O/P NEW LOW 30 MIN: CPT | Mod: GT | Performed by: FAMILY MEDICINE

## 2021-03-30 NOTE — PROGRESS NOTES
"Jese Joe is a 46 year old male who is being evaluated via a billable video visit.       The patient has been notified of following:      \"This video visit will be conducted via a call between you and your physician/provider. We have found that certain health care needs can be provided without the need for an in-person physical exam.  This service lets us provide the care you need with a video conversation.  If a prescription is necessary we can send it directly to your pharmacy.  If lab work is needed we can place an order for that and you can then stop by our lab to have the test done at a later time.     Video visits are billed at different rates depending on your insurance coverage.  Please reach out to your insurance provider with any questions.     If during the course of the call the physician/provider feels a video visit is not appropriate, you will not be charged for this service.\"     Patient has given verbal consent for Video visit? Yes  How would you like to obtain your AVS? Mail a copy  If you are dropped from the video visit, the video invite should be resent to: Text to cell phone: [phone]  Will anyone else be joining your video visit? No  If patient encounters technical issues they should call [phone]      Video-Visit Details     Type of service:  Video Visit     Video Start Time: 11am  Video End Time: 11:25am    Originating Location (pt. Location): Home     Distant Location (provider location):  [facility]      Platform used for Video Visit: [employer]    Virtual visit for review of home sleep testing results.     Assessment:  - Mild ILAN with mild sleep-associated hypoxemia  - Noted positional component    Plan:  -We reviewed that this level obstructive sleep apnea is not felt to have a significant increase in long-term cardiovascular risk factors.  Goals for treatment would be to reduce snoring, fatigue and improve sleep quality.  -We reviewed treatment " "options of CPAP, oral appliance, positional therapy, upper airway surgery, upper airway stimulation.  -We agreed to proceed with an oral appliance, he will contact his primary dentist at [facility] and I will place a sleep dentistry referral today.    SUBJECTIVE:  Jese Joe is a 46 year old year old male.    STOP-BANG score of 3, with unknown neck circumference.  [place] score of 17.  BMI of Estimated body mass index is 26.34 kg/m  as calculated from the following:    Height as of 7/22/20: 1.8 m (5' 10.87\").    Weight as of 7/22/20: 85.3 kg (188 lb 2 oz).      Per questionnaire: \"snoring and up multiple times a night\"     Caffeine use:  Yes for 3+ per day.  No for within 6 hours of bed.     Tobacco use: No     Sleep pattern:  Workdays.  9pm - 6am, total sleep time 6 hours.  Weekends.  10pm - 7am, total sleep time 7 hours.  Time to fall asleep: ~30 minutes.  Awakenings: 3 times per night, 10 minutes to return to sleep.  Napping.  2 days per week, 0.5 hours per nap.     No for RLS screen.  No for sleep walking.  No for dream enactment behavior.  Yes for bruxism.     No for morning headaches.  Yes for snoring.  No for observed apnea.  Yes for FHx of ILAN.     SHx:  , works as RN at [employer].    HOME SLEEP STUDY INTERPRETATION     Patient: Jese Joe  MRN: [id number]  YOB: 1975  Study Date: 3/15/2021  Referring Provider: Andrei Philip  Ordering Provider: Elvis Mc MD     Indications for Home Study: Jese Joe is a 46 year old male with a history of chronic opiate use who presents with symptoms suggestive of obstructive sleep apnea.     Estimated body mass index is 26.34 kg/m  as calculated from the following:    Height as of 7/22/20: 1.8 m (5' 10.87\").    Weight as of 7/22/20: 85.3 kg (188 lb 2 oz).  [place] Sleepiness Scale: 17/24  STOP-BANG: 3/8     Data: A full night home sleep study was performed recording the standard physiologic parameters including body " position, movement, sound, nasal pressure, thermal oral airflow, chest and abdominal movements with respiratory inductance plethysmography, and oxygen saturation by pulse oximetry. Pulse rate was estimated by oximetry recording. This study was considered adequate based on > 4 hours of quality oximetry and respiratory recording. As specified by the AASM Manual for the Scoring of Sleep and Associated events, version 2.3, Rule VIII.D 1B, 4% oxygen desaturation scoring for hypopneas is used as a standard of care on all home sleep apnea testing.     Analysis Time:  480.3 minutes     Respiration:   Sleep Associated Hypoxemia: sustained hypoxemia was not present. Baseline oxygen saturation was 94.3%.  Time with saturation less than or equal to 88% was 7.2 minutes. The lowest oxygen saturation was 81%.   Snoring: Snoring was present.  Respiratory events: The home study revealed a presence of 56 obstructive apneas and 1 mixed and central apneas. There were 11 hypopneas resulting in a combined apnea/hypopnea index [AHI] of 8.5 events per hour.  AHI was 12.5 per hour supine, - per hour prone, 1.6 per hour on left side, and 0 per hour on right side.   Pattern: Excluding events noted above, respiratory rate and pattern was Normal.     Position: Percent of time spent: supine - 65.9%, prone - 0%, on left - 15.7%, on right - 18.4%.     Heart Rate: By pulse oximetry normal rate was noted.      Assessment:   Mild obstructive sleep apnea.  Sleep associated hypoxemia was present.  Noted positional component.     Recommendations:  Consider auto-CPAP at 5-15 cmH2O, oral appliance therapy, positional therapy or polysomnography with full night PAP titration.  Suggest optimizing sleep hygiene and avoiding sleep deprivation.  Weight management.     Diagnosis Code(s): Obstructive Sleep Apnea G47.33, Hypoxemia G47.36     Elvis Mc MD, MD, March 22, 2021   Diplomate, American Board of Family Medicine, Sleep Medicine         Past  medical history:    Patient Active Problem List    Diagnosis Date Noted     Chronic, continuous use of opioids 09/23/2019     Priority: Medium     SBO (small bowel obstruction) (H) 03/26/2019     Priority: Medium     ACP (advance care planning) 07/31/2017     Priority: Medium     Advance Care Planning 7/31/2017: ACP Review of Chart / Resources Provided:  Reviewed chart for advance care plan.  Jese Joe has no plan or code status on file. Discussed available resources and provided with information.   Added by Indira Santoro                 10 point ROS of systems including Constitutional, Eyes, Respiratory, Cardiovascular, Gastroenterology, Genitourinary, Integumentary, Muscularskeletal, Psychiatric were all negative except for pertinent positives noted in my HPI.    OBJECTIVE:  There were no vitals taken for this visit.    Physical Exam  Constitutional:       General: He is not in acute distress.     Appearance: He is not ill-appearing, toxic-appearing or diaphoretic.   HENT:      Head: Normocephalic and atraumatic.      Right Ear: External ear normal.      Left Ear: External ear normal.      Nose: Nose normal.   Eyes:      Conjunctiva/sclera: Conjunctivae normal.   Pulmonary:      Effort: Pulmonary effort is normal. No respiratory distress.   Skin:     Coloration: Skin is not jaundiced or pale.      Findings: No bruising or erythema.   Neurological:      General: No focal deficit present.      Mental Status: He is alert and oriented to person, place, and time.   Psychiatric:         Mood and Affect: Mood normal.         Behavior: Behavior normal.         Thought Content: Thought content normal.         Judgment: Judgment normal.          ---  This note was written with the assistance of the Dragon voice-dictation technology software. The final document, although reviewed, may contain errors. For corrections, please contact the office.    Elvis Mc MD    Sleep Medicine  [facility]  [facility]  ([phone])  [facility]  ([phone])

## 2021-03-30 NOTE — PROGRESS NOTES
"Jese is a 46 year old who is being evaluated via a billable video visit.      How would you like to obtain your AVS? MyChart  If the video visit is dropped, the invitation should be resent by: Text to cell phone: [phone]  Will anyone else be joining your video visit? No  {If patient encounters technical issues they should call [phone] :[id number]}    Video Start Time: {video visit start/end time for provider to select:152948}    {PROVIDER CHARTING PREFERENCE:480532}    Subjective   Jese is a 46 year old who presents for the following health issues {ACCOMPANIED BY STATEMENT (Optional):369921}    HPI     ***    Review of Systems   {ROS COMP (Optional):611675}      Objective           Vitals:  No vitals were obtained today due to virtual visit.    Physical Exam   {video visit exam brief selected:205099::\"GENERAL: Healthy, alert and no distress\",\"EYES: Eyes grossly normal to inspection.  No discharge or erythema, or obvious scleral/conjunctival abnormalities.\",\"RESP: No audible wheeze, cough, or visible cyanosis.  No visible retractions or increased work of breathing.  \",\"SKIN: Visible skin clear. No significant rash, abnormal pigmentation or lesions.\",\"NEURO: Cranial nerves grossly intact.  Mentation and speech appropriate for age.\",\"PSYCH: Mentation appears normal, affect normal/bright, judgement and insight intact, normal speech and appearance well-groomed.\"}    {Diagnostic Test Results (Optional):334017}    {AMBULATORY ATTESTATION (Optional):999199}        Video-Visit Details    Type of service:  Video Visit    Video End Time:{video visit start/end time for provider to select:152948}    Originating Location (pt. Location): {video visit patient location:277313::\"Home\"}    Distant Location (provider location):  [facility]     Platform used for Video Visit: {Virtual Visit Platforms:527112::\"[employer]\"}    "

## 2021-04-05 ENCOUNTER — RESULTS ONLY (OUTPATIENT)
Dept: LAB | Age: 46
End: 2021-04-05

## 2021-04-18 ENCOUNTER — HEALTH MAINTENANCE LETTER (OUTPATIENT)
Age: 46
End: 2021-04-18

## 2021-04-21 ENCOUNTER — APPOINTMENT (OUTPATIENT)
Dept: OCCUPATIONAL MEDICINE | Facility: OTHER | Age: 46
End: 2021-04-21

## 2021-04-22 ENCOUNTER — OFFICE VISIT (OUTPATIENT)
Dept: CHIROPRACTIC MEDICINE | Facility: OTHER | Age: 46
End: 2021-04-22
Attending: CHIROPRACTOR
Payer: COMMERCIAL

## 2021-04-22 DIAGNOSIS — M54.2 CERVICALGIA: ICD-10-CM

## 2021-04-22 DIAGNOSIS — M99.02 SEGMENTAL AND SOMATIC DYSFUNCTION OF THORACIC REGION: ICD-10-CM

## 2021-04-22 DIAGNOSIS — M99.03 SEGMENTAL AND SOMATIC DYSFUNCTION OF LUMBAR REGION: ICD-10-CM

## 2021-04-22 DIAGNOSIS — M99.01 SEGMENTAL AND SOMATIC DYSFUNCTION OF CERVICAL REGION: Primary | ICD-10-CM

## 2021-04-22 PROCEDURE — 98941 CHIROPRACT MANJ 3-4 REGIONS: CPT | Mod: AT | Performed by: CHIROPRACTOR

## 2021-04-27 ENCOUNTER — OFFICE VISIT (OUTPATIENT)
Dept: CHIROPRACTIC MEDICINE | Facility: OTHER | Age: 46
End: 2021-04-27
Attending: CHIROPRACTOR
Payer: COMMERCIAL

## 2021-04-27 DIAGNOSIS — M54.50 ACUTE BILATERAL LOW BACK PAIN WITHOUT SCIATICA: ICD-10-CM

## 2021-04-27 DIAGNOSIS — M99.01 SEGMENTAL AND SOMATIC DYSFUNCTION OF CERVICAL REGION: ICD-10-CM

## 2021-04-27 DIAGNOSIS — M99.03 SEGMENTAL AND SOMATIC DYSFUNCTION OF LUMBAR REGION: Primary | ICD-10-CM

## 2021-04-27 DIAGNOSIS — M99.02 SEGMENTAL AND SOMATIC DYSFUNCTION OF THORACIC REGION: ICD-10-CM

## 2021-04-27 PROCEDURE — 98941 CHIROPRACT MANJ 3-4 REGIONS: CPT | Mod: AT | Performed by: CHIROPRACTOR

## 2021-05-10 NOTE — LETTER
History and Physical      Patient Name: Robbi Kennedy   Patient ID: [id number]   Sex: Male   Birthdate: Nola 3, 1958    Primary Care Provider: Lino Shaw MD   Referring Provider: Erik Garay MD    Visit Date: November 11, 2020    Provider: Aroldo Garay MD   Location: [facility]   Location Address: [address] [place] [address]  [id number]   Location Phone: [phone]          Chief Complaint  · Colon Consult      History Of Present Illness  Robbi Kennedy is a 62 year old /White male who follows-up status post low anterior resection for rectal cancer. He required a diverting ileostomy, which has since been taken down. He has done well and seems to have had his cancer treated adequately. He has been seen by our oncologist. At this point in time, he is in need of his repeat screening colonoscopy after his resection. He is not reporting any new symptoms. He reports he does have some difficulty and requires some straining for bowel movements but he is not reporting any narrowed or pencil thin bowel movements. He denies any blood per rectum. No unexplained weight loss or night sweats.       Past Medical History  Allergic rhinitis, chronic; Arthritis; Asthma; Bowel Disease; Cancer; Colon cancer; High blood pressure; High cholesterol         Past Surgical History  Colon; Colonoscopy; Hernia         Medication List  atorvastatin 40 mg oral tablet; Cardizem 120 mg oral tablet; Cardura 4 mg oral tablet; doxazosin 4 mg oral tablet; Eliquis 5 mg oral tablet; loratadine 10 mg oral tablet; metoprolol succinate 50 mg oral tablet extended release 24 hr; Suprep Bowel Prep Kit 17.5-3.13-1.6 gram oral recon soln; Synthroid 25 mcg oral tablet         Allergy List  NO KNOWN DRUG ALLERGIES         Family Medical History  Prostate Cancer; Family history of colon cancer         Social History  Alcohol (Current some day); Caffeine (Current - status unknown); Second hand smoke exposure (Never);  3/9/2020       RE: Jese Joe  [id number] [facility]  [address] [place] [id number]     Dear Colleague,    Thank you for referring your patient, Jese Joe, to the [facility] at [facility]. Please see a copy of my visit note below.    [facility] Outpatient Medical Nutrition Therapy      Time Spent: 45 Minutes  Session Type:  Initial Individual Session  Referring Physician: Dr. Vides  Reason for RD Visit: IBD     Nutrition Plan: Utilize the IBD-AID food list (Phase I and II) to help guide food choices. Decrease intake of processed/cured meats.     Recommendations for MD/Provider to order: None at this time    Malnutrition Diagnosis: Patient does not meet two of the above criteria necessary for diagnosing malnutrition    Nutrition Assessment:  Patient is here for intial visit with Registered Dietitian (RD).  Patient is a 45 year old male with history of SBO involving the terminal ileum in March of 2019. Following this he was seen by Dr. Vides 5/15/2019 at which time a plan was made to determine if Crohn's disease vs. NSAID-induced ulcer. He underwent a colonoscopy earlier today, which indicated a diagnosis of Crohn's. The patient is interested in attempting to treat with diet at this time and presents today to discuss diet in IBD. Diet recall suggests a diet high in processed/cured meats and low in fiber.    Past Surgical History: No IBD surgical history.    Symptom Review  1. Nausea/vomiting? Yes: Sometimes nausea. Infrequent  2. Heartburn? No  3. Bloating? Yes: A little bit depending on what he eats  4. Belching? No  5. Feeling full quickly? No  6. Decreased appetite? No  7. Weight loss/gain? No  8. Constipation/Diarrhea? Yes: 2-3 BMs/day. Loose most often  9. Urgency? No  10. Incomplete Bowel Emptying? No  11. Abdominal pain/pain with or without eating? Yes: Mild discomfort for 15-20 minutes after eating  12. Feeling tired? No    IBD-Q Score  "History  No flowsheet data found.    Dietary beliefs and Practices  10.  Do you take any vitamin, mineral, or herbal supplements? No  11.  Do you use any calorie/protein supplements?  No    Diet Recall:  (Typical Day)  Meal Name Time Food    Breakfast  Coffee AND eggs, molina, toast OR pancakes        Lunch  [place] (salami [often], turkey, OR roast beef) OR Chinese        Dinner  Meat, potatoes, vegetables for supper        Snacks  Crackers, meat sticks   Beverages  Water, coffee, milk   Alcohol   Beer (couple beers 3-4 night/wk)     Frequency of eating/taking out meals: Varies, but on average 2 nights/wk  Food access/availability: Fine  Food preparation confidence/abilities: Fine    Anthropometrics:   Height: 6' 0\"  Weight: 180 lbs 0 oz  BMI: Body mass index is 24.41 kg/m .    Weight History:  Wt Readings from Last 10 Encounters:   03/09/20 81.6 kg (180 lb)   03/09/20 81.6 kg (180 lb)   03/04/20 81.2 kg (179 lb)   03/04/20 81.6 kg (179 lb 14.3 oz)   02/13/20 81.6 kg (180 lb)   02/11/20 82.1 kg (181 lb)   11/10/19 86.2 kg (190 lb)   10/22/19 86.2 kg (190 lb)   08/15/19 81.6 kg (180 lb)   06/18/19 83.9 kg (185 lb)     Usual Weight: 180 pounds  Weight change in past 6 months: Stable    Labs: Reviewed  Pertinent Medications/vitamin and mineral supplements:   Current Outpatient Medications   Medication     Acetaminophen (TYLENOL PO)     albuterol (PROAIR HFA/PROVENTIL HFA/VENTOLIN HFA) 108 (90 Base) MCG/ACT inhaler     ALPRAZolam (XANAX) 0.5 MG tablet     budesonide (ENTOCORT EC) 3 MG EC capsule     cetirizine (ZYRTEC) 10 MG tablet     fluticasone (FLONASE) 50 MCG/ACT nasal spray     HYDROcodone-acetaminophen (NORCO) 5-325 MG tablet     ondansetron (ZOFRAN-ODT) 4 MG ODT tab     traZODone (DESYREL) 50 MG tablet     valACYclovir (VALTREX) 1000 mg tablet     Current Facility-Administered Medications   Medication     methylPREDNISolone (DEPO-MEDROL) injection 80 mg     Facility-Administered Medications Ordered in Other " "Tobacco (Never)         Review of Systems  · Constitutional  o Denies  o : chills, excessive sweating, fatigue, fever, sycope/passing out, weight gain, weight loss  · Eyes  o Denies  o : changes in vision, blurry vision, double vision  · HENT  o Denies  o : loss of hearing, ringing in the ears, ear aches, sore throat, nasal congestion, sinus pain, nose bleeds, seasonal allergies  · Cardiovascular  o Denies  o : blood clots, swollen legs, anemia, easy burising or bleeding, transfusions  · Respiratory  o Denies  o : shortness of breath, dry cough, productive cough, pneumonia, COPD  · Gastrointestinal  o Denies  o : difficulty swallowing, reflux  · Genitourinary  o Denies  o : incontinence  · Neurologic  o Denies  o : headache, seizure, stroke, tremor, loss of balance, falls, dizziness/vertigo, difficulty with sleep, numbness/tingling/paresthesia , difficulty with coordination, difficulty with dexterity, weakness  · Musculoskeletal  o Denies  o : neck stiffness/pain, swollen lymph nodes, muscle aches, joint pain, weakness, spasms, sciatica, pain radiating in arm, pain radiating in leg, low back pain  · Endocrine  o Denies  o : diabetes, thyroid disorder  · Psychiatric  o Denies  o : anxiety, depression      Vitals  Date Time BP Position Site L\R Cuff Size HR RR TEMP (F) WT  HT  BMI kg/m2 BSA m2 O2 Sat FR L/min FiO2        11/11/2020 03:49 PM       14  277lbs 0oz 6'  4\" 33.72 2.6             Physical Examination  · Constitutional  o Appearance  o : well developed, well-nourished, alert and in no acute distress  · Head and Face  o Head  o :   § Inspection  § : no deformities or lesions  · Eyes  o Conjunctivae  o : clear  o Sclerae  o : clear  · Neck  o Inspection/Palpation  o : normal appearance, no masses or tenderness, trachea midline  · Respiratory  o Respiratory Effort  o : breathing unlabored  o Inspection of Chest  o : normal appearance, no retractions  · Cardiovascular  o Heart  o : regular rate and " Visits   Medication     flumazenil (ROMAZICON) injection 0.2 mg     May continue current IV fluid if patient has IV fluids infusing until discharge.     naloxone (NARCAN) injection 0.1-0.4 mg     ondansetron (ZOFRAN-ODT) ODT tab 4 mg    Or     ondansetron (ZOFRAN) injection 4 mg     sodium chloride (PF) 0.9% PF flush 3 mL       Food Allergies: None  Food Intolerances: None  Physical Activity: Nothing formal  Estimated Nutrition Needs based on most recent body weight of 180 lbs 0 oz  : 2000 calories (25 kcals/kg), 80 g protein (1.0 g/kg), 2000 ml fluids (~1 ml/kcal or total fluids per MD).     MALNUTRITION:  % Weight Loss:  None noted  % Intake:  No decreased intake noted  Subcutaneous Fat Loss:  None observed  Muscle Loss:  None observed  Fluid Retention:  None noted    Nutrition Diagnosis:    Food and nutrition related knowledge deficit related to IBD as evidenced by need for diet education.    Nutrition Prescription: Regular diet    Nutrition Intervention:    Nutrition Education/Counseling:  Discussed diet and IBD history. Reviewed components of common diets currently proposed for IBD: Autoimmune protocol diet, IBD Anti-Inflammatory diet, Specific carbohydrate diet, Semi-vegetarian diet. Discussed that there is limited data on diet in IBD, but that these diets have been followed with some success in IBD. Highlighted that these all have a similar general theme geared towards more of a plant-based, less-processed diet composition. Introduced the IBD Anti-inflammatory diet food list. Discussed that certain aspects of the diet (e.g. gluten free, dairy free) may be overly restrictive, but that the food list seems to provide a reasonable texture-based diet advancement that can help guide food reintroduction. Discussed increasing intake of prebiotic/soluble fiber containing foods and consuming a more consistent intake of fiber throughout the day. Discussed limiting intake of processed/cured meats as well as high fat  rhythm  · Gastrointestinal  o Abdominal Examination  o : abdomen is soft.   · Lymphatic  o Neck  o : no lymphadenopathy present  o Axilla  o : no lymphadenopathy present  o Groin  o : no lymphadenopathy present  · Skin and Subcutaneous Tissue  o General Inspection  o : no rashes present, no lesions present, no areas of discoloration  · Neurologic  o Cranial Nerves  o : grossly intact  o Sensation  o : grossly intact  o Gait and Station  o :   § Gait Screening  § : normal gait, able to stand without diffculty  o Cerebellar Function  o : no obvious abnormalities  · Psychiatric  o Judgement and Insight  o : judgment and insight intact  o Mood and Affect  o : mood normal, affect appropriate          Assessment  · Pre-Surgical Orders     V72.84  · Screening for colon cancer     V76.51/Z12.11  · Pre-op testing     V72.84/Z01.818  · History of colon cancer     V10.05/Z85.038       Patient in need of a screening colonoscopy.       Plan  · Orders  o Colonoscopy (59289) - V10.05/Z85.038, V76.51/Z12.11 - 01/15/2021  o [facility] Pre-Op Covid-19 Screening (82973) - V72.84/Z01.818 - 01/11/2021 1/11/21 @[facility]. [address] [place] [address]  · Medications  o Medications have been Reconciled  o Transition of Care or Provider Policy  · Instructions  o PLAN:   o Handouts Provided-Pre-Procedure Instructions including date and time and location of procedure.  o Surgical Facility: [facility]  o ****Patient Status****  o Outpatient  o ********************  o RISK AND BENEFITS:  o Consent for surgery: Given these options, the patient has verbally expressed an understanding of the risks of surgery and finds these risks acceptable. We will proceed with surgery as soon as possible.  o Consult Anesthesia for any post-operative block, or any pain management procedure deemed necessary by the anestesiologist for adequate post-operative pain control.   o O.R. PREP: Per protocol  o IV: Per Anesthesia  o IV: LR@ 75ml/hr  o PLEASE SIGN PERMIT FOR:  Colonoscopy with possible biopsies  o *___The above History and Physical Examination has been completed within 30 days of admission.  o Electronically Identified Patient Education Materials Provided Electronically  · Referrals  o ID: [id number] Date: 04/28/2020 Type: Inbound  Specialty: General Surgery     We will plan for a screening colonoscopy in the near future, secondary to his history of cancer. Risks, benefits, and alternatives were discussed with the patient extensively. All questions were answered. The patient voiced understanding and agrees to proceed with the above plan.             Electronically Signed by: Annamaria Jon-, -Author on November 13, 2020 01:59:57 PM  Electronically Co-signed by: Aroldo Garay MD -Reviewer on November 13, 2020 02:17:47 PM   foods (e.g. fried foods).     Educational Materials Provided: AID-IBD  Patient verbalized understanding of education provided. See all recommendations under Goals.    Goals:  1. Utilize the AID-IBD food list to help guide food choices (Phase I and Phase II)    2. Discussed introducing a source of fiber with breakfast utilizing whole wheat products (bread, pancakes) and fruit.    3. Discussed use of plain meats with sandwich at lunch (examples deli turkey and roast beef) or plant-based protein sources such as peanut butter.     4. Discussed portions at evening meal. Limit intake of meat portion for a balanced fiber intake. Also discussed ensuring consumption of cooked vegetables to help with digestion (reference AID-IBD food list Phase I and II).     5. Discussed replacing meat sticks as a snack (reference AID-IBD food list Phase I and II)    Nutrition Monitoring and Evaluation: Will monitor adherence to nutrition recommendations at future RD visits.     Further Medical Nutrition Therapy: As needed  Next Appointment (if applicable): As needed  Patient was encouraged to call/contact RD with any further questions.    Po Washington, PhD, RD

## 2021-07-14 ENCOUNTER — HOSPITAL ENCOUNTER (EMERGENCY)
Facility: HOSPITAL | Age: 46
Discharge: HOME | End: 2021-07-14
Attending: INTERNAL MEDICINE | Admitting: INTERNAL MEDICINE
Payer: COMMERCIAL

## 2021-07-14 DIAGNOSIS — H04.123 DRY EYES: ICD-10-CM

## 2021-07-14 DIAGNOSIS — H02.89 IRRITATION OF EYELID: ICD-10-CM

## 2021-07-14 PROCEDURE — G0463 HOSPITAL OUTPT CLINIC VISIT: HCPCS

## 2021-07-14 PROCEDURE — 99213 OFFICE O/P EST LOW 20 MIN: CPT | Performed by: INTERNAL MEDICINE

## 2021-07-14 RX ORDER — TETRACAINE HYDROCHLORIDE 5 MG/ML
1-2 SOLUTION OPHTHALMIC ONCE
Status: DISCONTINUED | OUTPATIENT
Start: 2021-07-14 | End: 2021-07-14 | Stop reason: HOSPADM

## 2021-07-14 NOTE — ED TRIAGE NOTES
Right eye irritation and lid irritated.   
Was at eye doctor on Monday and then Tuesday got irritated.    
167.64

## 2021-07-17 ASSESSMENT — ENCOUNTER SYMPTOMS
NAUSEA: 0
TROUBLE SWALLOWING: 0
DOUBLE VISION: 0
DIZZINESS: 0
EYE REDNESS: 1
EYE ITCHING: 1
AGITATION: 0
ACTIVITY CHANGE: 0
CHILLS: 0
HEADACHES: 0
VOMITING: 0
SHORTNESS OF BREATH: 0
BLURRED VISION: 0
FEVER: 0
EYE PAIN: 1
NUMBNESS: 0
NECK PAIN: 0
EYE WATERING: 1

## 2021-07-17 NOTE — ED PROVIDER NOTES
History     Chief Complaint   Patient presents with     Eye Problem     The history is provided by the patient.   Eye Problem  Location:  Both eyes  Quality:  Aching  Severity:  Mild  Onset quality:  Gradual  Duration:  2 days  Progression:  Worsening  Chronicity:  New  Context: scratch    Context comment:  Eye exam   Associated symptoms: itching, redness and tearing    Associated symptoms: no blurred vision, no double vision, no headaches, no nausea, no numbness and no vomiting          Allergies:  Allergies   Allergen Reactions     Cleocin Hives       Problem List:    Patient Active Problem List    Diagnosis Date Noted     Chronic, continuous use of opioids 09/23/2019     Priority: Medium     SBO (small bowel obstruction) (H) 03/26/2019     Priority: Medium     ACP (advance care planning) 07/31/2017     Priority: Medium     Advance Care Planning 7/31/2017: ACP Review of Chart / Resources Provided:  Reviewed chart for advance care plan.  Jese A Tatyana has no plan or code status on file. Discussed available resources and provided with information.   Added by Indira Santoro                  Past Medical History:    Past Medical History:   Diagnosis Date     History of steroid therapy      Stomach problems        Past Surgical History:    Past Surgical History:   Procedure Laterality Date     COLONOSCOPY N/A 6/18/2019    Procedure: COLONOSCOPY, WITH POLYPECTOMY AND BIOPSY;  Surgeon: Urszula Vides MD;  Location: [facility] OR     COLONOSCOPY N/A 3/9/2020    Procedure: COLONOSCOPY, WITH POLYPECTOMY AND BIOPSY;  Surgeon: Urszula Vides MD;  Location: [facility] OR     ESOPHAGOSCOPY, GASTROSCOPY, DUODENOSCOPY (EGD), COMBINED N/A 6/18/2019    Procedure: ESOPHAGOGASTRODUODENOSCOPY, WITH BIOPSY;  Surgeon: Urszula Vides MD;  Location: [facility] OR     GENITOURINARY SURGERY       NO SURGICAL HISTORY[         Family History:    Family History   Problem Relation Age of Onset     Other - See Comments Mother      Diabetes Maternal Grandfather       Heart Disease Paternal Grandfather         MI     Diabetes Other      Colon Cancer No family hx of        Social History:  Marital Status:   [2]  Social History     Tobacco Use     Smoking status: Light Tobacco Smoker     Packs/day: 0.25     Years: 33.00     Pack years: 8.25     Types: Cigarettes     Start date: 1/1/1987     Smokeless tobacco: Never Used   Substance Use Topics     Alcohol use: Yes     Comment: occasionally     Drug use: No        Medications:    Acetaminophen (TYLENOL PO)  albuterol (PROAIR HFA/PROVENTIL HFA/VENTOLIN HFA) 108 (90 Base) MCG/ACT inhaler  ALPRAZolam (XANAX) 0.5 MG tablet  budesonide (ENTOCORT EC) 3 MG EC capsule  cetirizine (ZYRTEC) 10 MG tablet  fluticasone (FLONASE) 50 MCG/ACT nasal spray  HYDROcodone-acetaminophen (NORCO) 5-325 MG tablet  ketorolac (TORADOL) 10 MG tablet  ondansetron (ZOFRAN-ODT) 4 MG ODT tab  valACYclovir (VALTREX) 1000 mg tablet          Review of Systems   Constitutional: Negative for activity change, chills and fever.   HENT: Negative for congestion and trouble swallowing.    Eyes: Positive for pain, redness and itching. Negative for blurred vision and double vision.   Respiratory: Negative for shortness of breath.    Cardiovascular: Negative for chest pain.   Gastrointestinal: Negative for nausea and vomiting.   Musculoskeletal: Negative for neck pain.   Neurological: Negative for dizziness, numbness and headaches.   Psychiatric/Behavioral: Negative for agitation and behavioral problems.       Physical Exam   BP:  (pt refused)      Physical Exam  Constitutional:       General: He is not in acute distress.     Appearance: Normal appearance. He is not ill-appearing or diaphoretic.   HENT:      Head: Atraumatic.      Nose: Nose normal.   Eyes:      General: No scleral icterus.        Right eye: No foreign body.         Left eye: No foreign body.      Extraocular Movements:      Right eye: Normal extraocular motion and no nystagmus.      Left eye: Normal  extraocular motion and no nystagmus.      Conjunctiva/sclera:      Right eye: Right conjunctiva is not injected. No chemosis, exudate or hemorrhage.     Left eye: Left conjunctiva is not injected. No chemosis.     Pupils: Pupils are equal, round, and reactive to light.      Right eye: Pupil is round, reactive and not sluggish. No corneal abrasion.      Left eye: Pupil is round, reactive and not sluggish. No corneal abrasion.      Funduscopic exam:     Right eye: Red reflex present.         Left eye: Red reflex present.     Comments: Slight swelling and redness upper and lower lids b/l, right more than left   Cardiovascular:      Heart sounds: Normal heart sounds.   Pulmonary:      Effort: No respiratory distress.      Breath sounds: Normal breath sounds.   Abdominal:      General: Bowel sounds are normal.      Palpations: Abdomen is soft.      Tenderness: There is no abdominal tenderness.   Musculoskeletal:         General: No tenderness.   Skin:     General: Skin is warm.      Findings: No rash.   Neurological:      Mental Status: He is alert.         ED Course        Procedures                  No results found for this or any previous visit (from the past 24 hour(s)).    Medications - No data to display    Assessments & Plan (with Medical Decision Making)   Irritation of eyelids after annual eye exam 2 days ago  Right eyes feels dry and itching, pt already using eye lubricant drops for that  No change in vision   Continue using eye lubricant drop, return to ER if symptoms got worse   He understood and agreed.   I have reviewed the nursing notes.    I have reviewed the findings, diagnosis, plan and need for follow up with the patient.      Discharge Medication List as of 7/14/2021  7:27 PM          Final diagnoses:   Irritation of eyelid   Dry eyes       7/14/2021   [facility]     Zachariah Pryor MD  07/17/21 [id number]

## 2021-09-03 DIAGNOSIS — M54.2 NECK PAIN: ICD-10-CM

## 2021-09-03 DIAGNOSIS — F41.9 ANXIETY: ICD-10-CM

## 2021-09-07 RX ORDER — ALPRAZOLAM 0.5 MG
TABLET ORAL
Qty: 20 TABLET | Refills: 0 | Status: SHIPPED | OUTPATIENT
Start: 2021-09-07 | End: 2022-03-16

## 2021-09-07 RX ORDER — HYDROCODONE BITARTRATE AND ACETAMINOPHEN 5; 325 MG/1; MG/1
TABLET ORAL
Qty: 40 TABLET | Refills: 0 | Status: SHIPPED | OUTPATIENT
Start: 2021-09-07 | End: 2022-03-16

## 2021-09-07 NOTE — TELEPHONE ENCOUNTER
Norco, Xanax      Last Written Prescription Date:  2.18.21  Last Fill Quantity: #40, #20,   # refills: 0  Last Office Visit: 7.22.20  Future Office visit:       Routing refill request to provider for review/approval because:  Drug not on the [facility], [facility] or [facility] refill protocol or controlled substance

## 2021-09-21 ENCOUNTER — OFFICE VISIT (OUTPATIENT)
Dept: CHIROPRACTIC MEDICINE | Facility: OTHER | Age: 46
End: 2021-09-21
Attending: CHIROPRACTOR
Payer: COMMERCIAL

## 2021-09-21 DIAGNOSIS — M54.50 ACUTE BILATERAL LOW BACK PAIN WITHOUT SCIATICA: ICD-10-CM

## 2021-09-21 DIAGNOSIS — M99.02 SEGMENTAL AND SOMATIC DYSFUNCTION OF THORACIC REGION: ICD-10-CM

## 2021-09-21 DIAGNOSIS — M99.01 SEGMENTAL AND SOMATIC DYSFUNCTION OF CERVICAL REGION: ICD-10-CM

## 2021-09-21 DIAGNOSIS — M99.03 SEGMENTAL AND SOMATIC DYSFUNCTION OF LUMBAR REGION: Primary | ICD-10-CM

## 2021-09-21 PROCEDURE — 98941 CHIROPRACT MANJ 3-4 REGIONS: CPT | Mod: AT | Performed by: CHIROPRACTOR

## 2021-09-28 ENCOUNTER — TELEPHONE (OUTPATIENT)
Dept: SLEEP MEDICINE | Facility: HOSPITAL | Age: 46
End: 2021-09-28

## 2021-09-28 DIAGNOSIS — G47.33 OSA (OBSTRUCTIVE SLEEP APNEA): ICD-10-CM

## 2021-09-28 DIAGNOSIS — R53.82 CHRONIC FATIGUE: Primary | ICD-10-CM

## 2021-10-03 ENCOUNTER — HEALTH MAINTENANCE LETTER (OUTPATIENT)
Age: 46
End: 2021-10-03

## 2021-10-08 ENCOUNTER — DOCUMENTATION ONLY (OUTPATIENT)
Dept: HOME HEALTH SERVICES | Facility: CLINIC | Age: 46
End: 2021-10-08

## 2021-10-08 NOTE — PROGRESS NOTES
Patient was offered choice of vendor and chose [facility].  Patient Jese Joe was set up at [place] on October 8, 2021. Patient received a Resmed Airsense 11 Pressures were set at 5-15 cm H2O.   Patient s ramp is  Off and FLEX/EPR is EPR, 2.  Patient received a Resmed Mask name: Airfit [id number]  Nasal mask size Large, heated tubing and heated humidifier.  Patient does not need to meet compliance. Patient has a follow up on tbd with Dr. Mc.    Ravi Klein

## 2021-11-29 DIAGNOSIS — J98.01 BRONCHOSPASM: ICD-10-CM

## 2021-12-01 RX ORDER — FLUTICASONE PROPIONATE 50 MCG
SPRAY, SUSPENSION (ML) NASAL
Qty: 16 G | Refills: 0 | Status: SHIPPED | OUTPATIENT
Start: 2021-12-01 | End: 2022-01-11

## 2021-12-01 NOTE — TELEPHONE ENCOUNTER
Flonase  Last Written Prescription Date: 11/23/20  Last Fill Quantity: 1 # of Refills: 11  Last Office Visit: 7/22/20

## 2021-12-10 ENCOUNTER — LAB (OUTPATIENT)
Dept: OCCUPATIONAL MEDICINE | Facility: OTHER | Age: 46
End: 2021-12-10

## 2021-12-10 ENCOUNTER — TRANSFERRED RECORDS (OUTPATIENT)
Dept: HEALTH INFORMATION MANAGEMENT | Facility: HOSPITAL | Age: 46
End: 2021-12-10
Payer: COMMERCIAL

## 2021-12-11 ENCOUNTER — HOSPITAL ENCOUNTER (EMERGENCY)
Facility: HOSPITAL | Age: 46
Discharge: HOME | End: 2021-12-11
Attending: NURSE PRACTITIONER | Admitting: NURSE PRACTITIONER
Payer: COMMERCIAL

## 2021-12-11 VITALS
HEART RATE: 77 BPM | RESPIRATION RATE: 18 BRPM | SYSTOLIC BLOOD PRESSURE: 143 MMHG | OXYGEN SATURATION: 97 % | DIASTOLIC BLOOD PRESSURE: 99 MMHG | TEMPERATURE: 96.8 F

## 2021-12-11 DIAGNOSIS — J06.9 UPPER RESPIRATORY TRACT INFECTION, UNSPECIFIED TYPE: ICD-10-CM

## 2021-12-11 DIAGNOSIS — J06.9 URI (UPPER RESPIRATORY INFECTION): ICD-10-CM

## 2021-12-11 PROCEDURE — 99213 OFFICE O/P EST LOW 20 MIN: CPT | Performed by: NURSE PRACTITIONER

## 2021-12-11 PROCEDURE — G0463 HOSPITAL OUTPT CLINIC VISIT: HCPCS

## 2021-12-11 RX ORDER — PREDNISONE 10 MG/1
TABLET ORAL
Qty: 7 TABLET | Refills: 0 | Status: SHIPPED | OUTPATIENT
Start: 2021-12-11 | End: 2021-12-17

## 2021-12-11 RX ORDER — ALBUTEROL SULFATE 90 UG/1
2 AEROSOL, METERED RESPIRATORY (INHALATION) EVERY 6 HOURS PRN
Qty: 18 G | Refills: 0 | Status: SHIPPED | OUTPATIENT
Start: 2021-12-11 | End: 2022-02-08

## 2021-12-11 RX ORDER — INHALER, ASSIST DEVICES
SPACER (EA) MISCELLANEOUS
Qty: 1 EACH | Refills: 0 | Status: SHIPPED | OUTPATIENT
Start: 2021-12-11

## 2021-12-11 RX ORDER — ONDANSETRON 4 MG/1
4 TABLET, FILM COATED ORAL EVERY 8 HOURS PRN
Qty: 6 TABLET | Refills: 0 | Status: SHIPPED | OUTPATIENT
Start: 2021-12-11 | End: 2022-04-05

## 2021-12-11 ASSESSMENT — ENCOUNTER SYMPTOMS
RHINORRHEA: 1
SINUS PAIN: 0
FEVER: 1
COUGH: 1
SHORTNESS OF BREATH: 1
MYALGIAS: 1
CHILLS: 1
LIGHT-HEADEDNESS: 0
ACTIVITY CHANGE: 1
HEADACHES: 0
DIARRHEA: 0
NAUSEA: 1
FATIGUE: 1
SORE THROAT: 1
APPETITE CHANGE: 0
DIZZINESS: 0
EYES NEGATIVE: 1
VOMITING: 0
SINUS PRESSURE: 0

## 2021-12-11 NOTE — DISCHARGE INSTRUCTIONS
Increase oral intake, cool mist vaporizer as needed, rest, avoid sharing utensils, practice good hand washing techniques, cover mouth when you cough and sneeze. Throw toothbursh away 24 hours after starting antibiotics.  Over the counter medications such as ibuprofen and/or acetaminophen for fever and generalized aches and pains. Ibuprofen 400 to 800 mg (2 - 4 tabs of over the counter med) every six to eight hours as needed;not to exceed maximum amount of 3200 mg in 24 hours.Tylenol 650 to 1000 mg every four to six hours as needed (not to exceed more than 4000 mg in a 24 hour period). May use interchangeably. Robitussin (guaifenesin) for cough. Chest rubs such as Ren's or Mentholatum may help reduce sore throat symptoms.  Chloraseptic spray for sore throat or menthol lozenges may be helpful for sore throat. Be reevaluated if symptoms persist longer than 10 - 14 days or worsen and if there is no improvement in 72 hours or worsening of symptoms.  Increase fluids. Complete all antibiotics even if feeling better. Taking antibiotics with food may decrease the stomach upset that can occur when taking antibiotics. Antibiotics frequently cause diarrhea. Probiotics or yogurt may help prevent or decrease these symptoms.     OTC decongestants (oral or topical).  Decongestants (oral or topical) cause vasoconstriction of the nasal mucosa.  We prefer oral pseudoephedrine to phenylephrine and other oral OTC nasal decongestants. Side effects of oral decongestants may include tachycardia, elevated diastolic blood pressure, and palpitations. Pseudoephedrine 30 to 60 mg every four to six hours as needed for congestion. (Maximum dose is 240 mg in 24 hours). Do not use longer than 72 hours.    Commonly used topical decongestants include oxymetazoline, xylometazoline, and phenylephrine. Side effects of topical decongestants include nosebleeds and drying of the nasal membranes. Topical decongestants should only be used for two to three  days; longer use may result in rebound nasal congestion after discontinuation.    Fluids, herbs, and foods for sore throat relief -- Adjusting the temperature and texture of foods and beverages may provide local relief of sore throat pain. While data showing benefit are quite limited, these approaches are intuitive. We typically advise these measures since they are likely to be safe with minimal adverse effect, and patients often describe relief of symptoms.  We suggest hydration with frozen (eg, ice or popsicles) or heated liquids (eg, teas, soups), rather than room temperature or refrigerated fluids in patient with significant sore throat pain. Very cold foods can have a numbing-like effect that temporarily reduces or alleviates the pain of swallowing. Ice cubes or frozen popsicles facilitate hydration; ice cream and frozen yogurt provide caloric intake.  Warm fluids and foods, including teas, soups, and soft non-irritating foods, may be better tolerated by patients with throat pain than irritating foods (eg, rough-textured or spicy foods) or fluids at room temperatures. Foods that coat the throat, including honey and hard candies, can facilitate intake of calories while temporarily relieving throat pain.

## 2021-12-11 NOTE — ED PROVIDER NOTES
History     Chief Complaint   Patient presents with     Cough     HPI  Jese Joe is a 46 year old male who presents with symptoms of chills, fatigue, low-grade fever, runny nose, sore throat, cough, shortness of breath, nausea, and body aches.  Symptoms started 4 days ago.  He tested negative for Covid and influenza at [facility].  Flonase, Tylenol, and DayQuil does decrease his symptoms.  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  Has had COVID vaccination; second dose 2/2021.  Denies vomiting, diarrhea, headaches, dizziness, and lightheadedness.    Allergies:  Allergies   Allergen Reactions     Cleocin Hives       Problem List:    Patient Active Problem List    Diagnosis Date Noted     Chronic, continuous use of opioids 09/23/2019     Priority: Medium     SBO (small bowel obstruction) (H) 03/26/2019     Priority: Medium     ACP (advance care planning) 07/31/2017     Priority: Medium     Advance Care Planning 7/31/2017: ACP Review of Chart / Resources Provided:  Reviewed chart for advance care plan.  Jese Joe has no plan or code status on file. Discussed available resources and provided with information.   Added by Indira Santoro                  Past Medical History:    Past Medical History:   Diagnosis Date     History of steroid therapy      Stomach problems        Past Surgical History:    Past Surgical History:   Procedure Laterality Date     COLONOSCOPY N/A 6/18/2019    Procedure: COLONOSCOPY, WITH POLYPECTOMY AND BIOPSY;  Surgeon: Urszula Vides MD;  [facility]: [facility]     COLONOSCOPY N/A 3/9/2020    Procedure: COLONOSCOPY, WITH POLYPECTOMY AND BIOPSY;  Surgeon: Urszula Vides MD;  Location:  OR     ESOPHAGOSCOPY, GASTROSCOPY, DUODENOSCOPY (EGD), COMBINED N/A 6/18/2019    Procedure: ESOPHAGOGASTRODUODENOSCOPY, WITH BIOPSY;  Surgeon: Urszula Vides MD;  [facility]: [facility] OR     GENITOURINARY SURGERY       NO SURGICAL HISTORY[         Family History:    Family History    Problem Relation Age of Onset     Other - See Comments Mother      Diabetes Maternal Grandfather      Heart Disease Paternal Grandfather         MI     Diabetes Other      Colon Cancer No family hx of        Social History:  Marital Status:   [2]  Social History     Tobacco Use     Smoking status: Light Tobacco Smoker     Packs/day: 0.25     Years: 33.00     Pack years: 8.25     Types: Cigarettes     Start date: 1/1/1987     Smokeless tobacco: Never Used   Substance Use Topics     Alcohol use: Yes     Comment: occasionally     Drug use: No        Medications:    albuterol (PROAIR HFA/PROVENTIL HFA/VENTOLIN HFA) 108 (90 Base) MCG/ACT inhaler  ondansetron (ZOFRAN) 4 MG tablet  predniSONE (DELTASONE) 10 MG tablet  spacer (OPTICHAMBER COREY) holding chamber  Acetaminophen (TYLENOL PO)  albuterol (PROAIR HFA/PROVENTIL HFA/VENTOLIN HFA) 108 (90 Base) MCG/ACT inhaler  ALPRAZolam (XANAX) 0.5 MG tablet  budesonide (ENTOCORT EC) 3 MG EC capsule  cetirizine (ZYRTEC) 10 MG tablet  fluticasone (FLONASE) 50 MCG/ACT nasal spray  HYDROcodone-acetaminophen (NORCO) 5-325 MG tablet  ondansetron (ZOFRAN-ODT) 4 MG ODT tab  valACYclovir (VALTREX) 1000 mg tablet          Review of Systems   Constitutional: Positive for activity change, chills, fatigue and fever (low grade). Negative for appetite change.   HENT: Positive for rhinorrhea and sore throat. Negative for ear pain, sinus pressure and sinus pain.    Eyes: Negative.    Respiratory: Positive for cough and shortness of breath.    Gastrointestinal: Positive for nausea. Negative for diarrhea and vomiting.   Genitourinary: Negative.    Musculoskeletal: Positive for myalgias.   Skin: Negative.    Neurological: Negative for dizziness, light-headedness and headaches.       Physical Exam   BP: 143/99  Pulse: 77  Temp: 96.8  F (36  C)  Resp: 18  SpO2: 97 %      Physical Exam  Vitals and nursing note reviewed.   Constitutional:       General: He is not in acute distress.  HENT:       Head: Normocephalic.      Right Ear: Tympanic membrane and ear canal normal.      Left Ear: Tympanic membrane and ear canal normal.      Nose: Nose normal.      Right Sinus: No maxillary sinus tenderness or frontal sinus tenderness.      Left Sinus: No maxillary sinus tenderness or frontal sinus tenderness.      Mouth/Throat:      Lips: Pink.      Mouth: Mucous membranes are moist.      Pharynx: Uvula midline. No posterior oropharyngeal erythema.   Eyes:      Conjunctiva/sclera: Conjunctivae normal.   Cardiovascular:      Rate and Rhythm: Normal rate and regular rhythm.      Heart sounds: Normal heart sounds. No murmur heard.      Pulmonary:      Effort: Pulmonary effort is normal. No respiratory distress.      Breath sounds: Normal breath sounds. No wheezing.   Lymphadenopathy:      Cervical: No cervical adenopathy.   Skin:     General: Skin is warm and dry.   Neurological:      Mental Status: He is alert and oriented to person, place, and time.   Psychiatric:         Behavior: Behavior normal.         ED Course                 Procedures             No results found for this or any previous visit (from the past 24 hour(s)).    Medications - No data to display    Assessments & Plan (with Medical Decision Making)     I have reviewed the nursing notes.    I have reviewed the findings, diagnosis, plan and need for follow up with the patient.  (J06.9) URI (upper respiratory infection)  Comment: 46 year old male who presents with symptoms of chills, fatigue, low-grade fever, runny nose, sore throat, cough, shortness of breath, nausea, and body aches.  Symptoms started 4 days ago.  He tested negative for Covid and influenza at [facility].  Flonase, Tylenol, and DayQuil does decrease his symptoms.  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  Has had COVID vaccination; second dose 2/2021.  Denies vomiting, diarrhea, headaches, dizziness, and lightheadedness.    MDM: NHT. Lungs CTA    Plan: Zofran ODT,  prednisone burst, and albuterol inhaler.  Education provided and/or discussed for this/these medications and for viral URI.   Treat symptoms conservatively with acetaminophen and  ibuprofen (if applicable) for fevers, body aches, and headaches, guaifenesin and/or honey for cough. May use chest rubs for sore throat and congestion, hot and cold liquids may help decrease sore throat and help you feel better. Increase fluids. You may utilize pseudoephedrine for congestion. Return to be reevaluated by ER/UC or your primary care provider if symptoms worsen, you develop breathing difficulties, or you do not improve in a reasonable time frame. It can take several days for a cough to resolve. It can take ten to fourteen days for upper respiratory symptoms to resolve.   These discharge instructions and medications were reviewed with him and understanding verbalized.    This document was prepared using a combination of typing and voice generated software.  While every attempt was made for accuracy, spelling and grammatical errors may exist.    Discharge Medication List as of 12/11/2021 11:01 AM      START taking these medications    Details   !! albuterol (PROAIR HFA/PROVENTIL HFA/VENTOLIN HFA) 108 (90 Base) MCG/ACT inhaler Inhale 2 puffs into the lungs every 6 hours as needed for shortness of breath / dyspnea or wheezing, Disp-18 g, R-0, E-PrescribePharmacy may dispense brand covered by insurance (Proair, or proventil or ventolin or generic albuterol inhaler)      ondansetron (ZOFRAN) 4 MG tablet Take 1 tablet (4 mg) by mouth every 8 hours as needed for nausea, Disp-6 tablet, R-0, E-Prescribe      predniSONE (DELTASONE) 10 MG tablet Two tablets daily for 2 days, then one tab for two days, the 1/2 tab daily for 2 days, Disp-7 tablet, R-0, E-Prescribe      spacer (OPTICHAMBER COREY) holding chamber Use with inhaler, Disp-1 each, R-0, E-Prescribe       !! - Potential duplicate medications found. Please discuss with provider.           Final diagnoses:   URI (upper respiratory infection)       12/11/2021   [facility]       Laurence Barahona, CNP  12/14/21 [id number]

## 2022-01-10 DIAGNOSIS — J98.01 BRONCHOSPASM: ICD-10-CM

## 2022-01-11 RX ORDER — FLUTICASONE PROPIONATE 50 MCG
SPRAY, SUSPENSION (ML) NASAL
Qty: 16 G | Refills: 0 | Status: SHIPPED | OUTPATIENT
Start: 2022-01-11 | End: 2022-03-16

## 2022-01-11 NOTE — TELEPHONE ENCOUNTER
fluticasone (FLONASE) 50 MCG/ACT nasal spray        Last Written Prescription Date:  12/1/21  Last Fill Quantity: 16 g,   # refills: 0  Last Office Visit: 7/22/20  Future Office visit:       Routing refill request to provider for review/approval because:  Due to last appointment > 1 year

## 2022-01-12 ENCOUNTER — IMMUNIZATION (OUTPATIENT)
Dept: FAMILY MEDICINE | Facility: OTHER | Age: 47
End: 2022-01-12
Attending: FAMILY MEDICINE
Payer: COMMERCIAL

## 2022-01-12 PROCEDURE — 0004A PR COVID VAC PFIZER DIL RECON 30 MCG/0.3 ML IM: CPT

## 2022-01-12 PROCEDURE — 91300 PR COVID VAC PFIZER DIL RECON 30 MCG/0.3 ML IM: CPT

## 2022-01-27 ENCOUNTER — TELEPHONE (OUTPATIENT)
Dept: GASTROENTEROLOGY | Facility: CLINIC | Age: 47
End: 2022-01-27
Payer: COMMERCIAL

## 2022-01-27 NOTE — TELEPHONE ENCOUNTER
Per nurse patient scheduled for a follow up with Royal Benoit on February 23rd at 9am video. Patient was ok with this date and will call [phone] if needs to reschedule.

## 2022-02-07 ENCOUNTER — MYC MEDICAL ADVICE (OUTPATIENT)
Dept: GASTROENTEROLOGY | Facility: CLINIC | Age: 47
End: 2022-02-07
Payer: COMMERCIAL

## 2022-02-08 DIAGNOSIS — J98.01 BRONCHOSPASM: Primary | ICD-10-CM

## 2022-02-08 RX ORDER — ALBUTEROL SULFATE 90 UG/1
2 AEROSOL, METERED RESPIRATORY (INHALATION) EVERY 6 HOURS PRN
Qty: 18 G | Refills: 1 | Status: SHIPPED | OUTPATIENT
Start: 2022-02-08

## 2022-02-08 NOTE — PLAN OF CARE
Clinic Administered Medication Documentation    Administrations This Visit     cyanocobalamin injection 1,000 mcg     Admin Date  02/08/2022 Action  Given Dose  1,000 mcg Route  Intramuscular Site  Right Deltoid Administered By  Fabiana Delarosa CMA    Ordering Provider: Byron Ham MD    Patient Supplied?: No                  Injectable Medication Documentation    Patient was given Cyanocobalamin (B-12). Prior to medication administration, verified patients identity using patient s name and date of birth. Please see MAR and medication order for additional information. Patient instructed to report any adverse reaction to staff immediately .      Was entire vial of medication used? Yes  Vial/Syringe: Single dose vial  Expiration Date:  6/2023  Was this medication supplied by the patient? No   [facility] Inpatient Admission Note:    Patient admitted to 3232/3232-1 at approximately 0050 via cart accompanied by other:ER staff  from emergency room . Report received from Brendon in SBAR format at 0030 via telephone. Patient ambulated to bed via self.. Patient is alert and oriented X 3, reports pain; rates at 3 on 0-10 scale.  Patient oriented to room, unit, hourly rounding, and plan of care. Explained admission packet and patient handbook with patient bill of rights brochure. Will continue to monitor and document as needed.     Inpatient Nursing criteria listed below was met:    Health care directives status obtained and documented: Yes    Care Everywhere authorization obtained No    MRSA swab completed for patient 65 years and older: N/A    Patient identifies a surrogate decision maker: No If yes, who:Wife: Therese Contact Information:see facesheet    Core Measure diagnosis present:No.      If initial lactic acid >2.0, repeat lactic acid drawn within one hour of arrival to unit: NA. If no, state reason: initial 1.1    Vaccination assessment and education completed: Yes   Vaccinations received prior to admission: Pneumovax no  Influenza(seasonal)  YES   Vaccination(s) ordered: received prior     Clergy visit ordered if patient requests: N/A    Skin issues/needs documented: N/A    Isolation Patient: no Education given, correct sign in place and documentation row added to PCS:  NA     Fall Prevention No: Care plan updated, education given and documented, sticker and magnet in place: N/A    Care Plan initiated: Yes    Education Documented (including assessment): Yes    Patient has discharge needs : No If yes, please explain:NA

## 2022-02-15 NOTE — TELEPHONE ENCOUNTER
Called to remind patient of their upcoming appointment with our GI clinic, on Wednesday 2/23/2022 at 9AM with Dr. Benoit. This appointment is scheduled as a video visit. You will receive a call approximately 30 minutes prior to check you in, you must be in [place] for this visit., if your appointment is virtual (video or telephone) you need to be in [place] for the visit. To reschedule or cancel patient to call [phone].    Called and LM    Fiona Short MA

## 2022-02-16 ENCOUNTER — LAB (OUTPATIENT)
Dept: OCCUPATIONAL MEDICINE | Facility: OTHER | Age: 47
End: 2022-02-16

## 2022-02-16 ENCOUNTER — LAB REQUISITION (OUTPATIENT)
Dept: LAB | Facility: HOSPITAL | Age: 47
End: 2022-02-16

## 2022-02-16 LAB
FLUAV RNA SPEC QL NAA+PROBE: NEGATIVE
FLUBV RNA RESP QL NAA+PROBE: NEGATIVE
RSV RNA SPEC NAA+PROBE: NEGATIVE
SARS-COV-2 RNA RESP QL NAA+PROBE: NEGATIVE

## 2022-02-16 PROCEDURE — 87637 SARSCOV2&INF A&B&RSV AMP PRB: CPT | Performed by: FAMILY MEDICINE

## 2022-02-23 ENCOUNTER — VIRTUAL VISIT (OUTPATIENT)
Dept: GASTROENTEROLOGY | Facility: CLINIC | Age: 47
End: 2022-02-23
Payer: COMMERCIAL

## 2022-02-23 DIAGNOSIS — K56.609 SBO (SMALL BOWEL OBSTRUCTION) (H): Primary | ICD-10-CM

## 2022-02-23 PROCEDURE — 99204 OFFICE O/P NEW MOD 45 MIN: CPT | Mod: 95 | Performed by: STUDENT IN AN ORGANIZED HEALTH CARE EDUCATION/TRAINING PROGRAM

## 2022-02-23 NOTE — PATIENT INSTRUCTIONS
Mariaa Sawant,     It was so nice meeting you today, As discussed;  -We will obtain a colonoscopy after which we will decide on the next steps in your management based on the colonoscopy findings   - We will see you back in clinic for follow up in 3 months.       Thank you for your visit today. Stay well and be well.

## 2022-02-23 NOTE — NURSING NOTE
Patient verified meds and allergies are correct via patients echeck-in.    La Beckman, Virtual Facilitator

## 2022-02-23 NOTE — PROGRESS NOTES
Jese is a 47 year old who is being evaluated via a billable video visit.      How would you like to obtain your AVS? Mail a copy  If the video visit is dropped, the invitation should be resent by: Text to cell phone: [phone]  Will anyone else be joining your video visit? No      Video Start Time: 9 am  Video-Visit Details    Type of service:  Video Visit    Video End Time:9:45 am    Originating Location (pt. Location): Home    Distant Location (provider location):  [facility] [place]     Platform used for Video Visit: Endo[employer]         GI CLINIC VISIT    CC/REFERRING MD:  No ref. provider found  REASON FOR CONSULTATION:   No ref. provider found for   Chief Complaint   Patient presents with     Video Visit       ASSESSMENT/PLAN:    47 year old male patient with a hx of SBO involving the terminal ileum concerning for Crohns disease s/p colonoscopy in 2019 which revealed multiple ulcers in the distal ileum biopsied- pathology report revealed focal erosion and superficial cryptitis.      Patient is presenting today for follow up. He states he has been doing well since he was last seen in 2019. He was under the impression he did not need to follow up until now. He states he has been doing well with no new, alarming or concerning symptoms.     He reports ongoing mild abdominal pain but nothing significant enough to keep him from his usual daily activities. He also reports an average of 3-4 non bloody BMs daily. He denies weight loss, urgency, incontinence, mucus in stool, fever, nausea, vomiting, or  abdominal distention.   He report quitting tobacco use about a year ago. He states Sept 2021 was his 1 year anniversary.  Patient states he has been doing well otherwise.     We will like to repeat a colonoscopy today since patient has not had one in over 2 years we will decide on next steps in management after colonoscopy     Plan:   -Proceed with colonoscopy evaluation, decision on next  steps in management after colonoscopy   -We will see you back in clinic in 3 months for follow up.   -Please continue to avoid NSAID use ( Ibuprofen, Aleve, Advil, Excedrin etc)    RTC: Return in about 3 months (around 5/23/2022).       45 minutes spent on the date of the encounter performing chart review, history and exam, documentation and further activities as noted above.  .      Royal Benoit MD  Gastroenterology Fellow  Division of Gastroenterology, Hepatology and Nutrition  [facility]  P:         HPI  47 year old male patient with a hx of SBO involving the terminal ileum concerning for Crohns disease s/p colonoscopy in 2019 which revealed multiple ulcers in the distal ileum biopsied- pathology report revealed focal erosion and superficial cryptitis.      Patient presenting today for follow up. He states he has been doing well since he was last seen in 2019. He was under the impression he did not need to follow up until now. He states he has been doing well with no new, alarming or concerning symptoms.     He reports ongoing mild abdominal pain but nothing significant enough to keep him from his usual daily activities. He also reports an average of 3-4 non bloody BMs daily. He denies weight loss, urgency, incontinence, mucus in stool, fever, nausea, vomiting, or  abdominal distention.   He report quitting tobacco use about a year ago. He states Sept 2021 was his 1 year anniversary.  Patient states he has been doing well otherwise.       ROS:    No fevers or chills  No weight loss  No blurry vision, double vision or change in vision  No sore throat  No lymphadenopathy  No headache, paraesthesias, or weakness in a limb  No shortness of breath or wheezing  No chest pain or pressure  No arthralgias or myalgias  No rashes or skin changes  No odynophagia or dysphagia  No BRBPR, hematochezia, melena  No dysuria, frequency or urgency  No hot/cold intolerance or polyria  No anxiety or  depression    PROBLEM LIST  Patient Active Problem List    Diagnosis Date Noted     Chronic, continuous use of opioids 09/23/2019     Priority: Medium     SBO (small bowel obstruction) (H) 03/26/2019     Priority: Medium     ACP (advance care planning) 07/31/2017     Priority: Medium     Advance Care Planning 7/31/2017: ACP Review of Chart / Resources Provided:  Reviewed chart for advance care plan.  Jese Joe has no plan or code status on file. Discussed available resources and provided with information.   Added by Indira Santoro                 PERTINENT PAST MEDICAL HISTORY:  Past Medical History:   Diagnosis Date     History of steroid therapy      Stomach problems        PREVIOUS SURGERIES:  Past Surgical History:   Procedure Laterality Date     COLONOSCOPY N/A 6/18/2019    Procedure: COLONOSCOPY, WITH POLYPECTOMY AND BIOPSY;  Surgeon: Urszula Vides MD;  [facility]     COLONOSCOPY N/A 3/9/2020    Procedure: COLONOSCOPY, WITH POLYPECTOMY AND BIOPSY;  Surgeon: Urszula Vides MD;  [facility]     ESOPHAGOSCOPY, GASTROSCOPY, DUODENOSCOPY (EGD), COMBINED N/A 6/18/2019    Procedure: ESOPHAGOGASTRODUODENOSCOPY, WITH BIOPSY;  Surgeon: Urszula Vides MD;  Location: [facility] OR     GENITOURINARY SURGERY       NO SURGICAL HISTORY[         PREVIOUS ENDOSCOPY:    Colonoscopy June 2019                                                                  Impression:          - Multiple ulcers in the distal ileum and in the                        terminal ileum. Biopsied. Differential is Crohn's                        disease vs. NSAID-induced enteropathy.                        - Two 7 to 10 mm polyps in the transverse colon and in                        the ascending colon, removed with a cold snare. Resected                        and retrieved.                        - Diverticulosis in the sigmoid colon.                        - Ring-like mucosa in the distal rectum. Biopsied.                        -  Non-bleeding internal hemorrhoids.     ALLERGIES:     Allergies   Allergen Reactions     Cleocin Hives       PERTINENT MEDICATIONS:    Current Outpatient Medications:      Acetaminophen (TYLENOL PO), Take 1,000 mg by mouth every 6 hours as needed , Disp: , Rfl:      albuterol (PROAIR HFA/PROVENTIL HFA/VENTOLIN HFA) 108 (90 Base) MCG/ACT inhaler, Inhale 2 puffs into the lungs every 6 hours as needed for shortness of breath / dyspnea or wheezing, Disp: 18 g, Rfl: 1     albuterol (PROAIR HFA/PROVENTIL HFA/VENTOLIN HFA) 108 (90 Base) MCG/ACT inhaler, Inhale 2 puffs into the lungs every 4 hours as needed for shortness of breath / dyspnea or wheezing, Disp: 1 Inhaler, Rfl: 3     ALPRAZolam (XANAX) 0.5 MG tablet, TAKE 1 TABLET BY MOUTH 2 TIMES A DAY AS NEEDED FOR ANXIETY, Disp: 20 tablet, Rfl: 0     budesonide (ENTOCORT EC) 3 MG EC capsule, Take 2 capsules (6 mg) by mouth every morning (Patient not taking: Reported on 2/13/2020), Disp: 60 capsule, Rfl: 1     cetirizine (ZYRTEC) 10 MG tablet, Take 10 mg by mouth daily, Disp: , Rfl:      fluticasone (FLONASE) 50 MCG/ACT nasal spray, SPRAY 1 TO 2 SPRAYS INTO BOTH NOSTRILS DAILY, Disp: 16 g, Rfl: 0     HYDROcodone-acetaminophen (NORCO) 5-325 MG tablet, TAKE 1 TABLET BY MOUTH 2 TIMES A DAY AS NEEDED, Disp: 40 tablet, Rfl: 0     ondansetron (ZOFRAN) 4 MG tablet, Take 1 tablet (4 mg) by mouth every 8 hours as needed for nausea, Disp: 6 tablet, Rfl: 0     ondansetron (ZOFRAN-ODT) 4 MG ODT tab, DISSOLVE 1 TABLET IN MOUTH EVERY 6 HOURS AS NEEDED FOR NAUSEA, Disp: 20 tablet, Rfl: 0     spacer (OPTICHAMBER COREY) holding chamber, Use with inhaler, Disp: 1 each, Rfl: 0     valACYclovir (VALTREX) 1000 mg tablet, TAKE 1 TABLET BY MOUTH THREE TIMES DAILY FOR 7 DAYS, Disp: 21 tablet, Rfl: 0    SOCIAL HISTORY:  Social History     Socioeconomic History     Marital status:      Spouse name: Not on file     Number of children: Not on file     Years of education: Not on file      Highest education level: Not on file   Occupational History     Not on file   Tobacco Use     Smoking status: Light Tobacco Smoker     Packs/day: 0.25     Years: 33.00     Pack years: 8.25     Types: Cigarettes     Start date: 1/1/1987     Smokeless tobacco: Never Used   Substance and Sexual Activity     Alcohol use: Yes     Comment: occasionally     Drug use: No     Sexual activity: Yes     Partners: Female     Birth control/protection: Male Surgical   Other Topics Concern      Service No     Blood Transfusions Yes     Comment: Permits if needed     Caffeine Concern Yes     Comment: coffee, soda > 6 cups a day     Occupational Exposure No     Hobby Hazards No     Sleep Concern No     Stress Concern No     Weight Concern No     Special Diet No     Back Care Yes     Comment: upper neck     Exercise No     Bike Helmet Not Asked     Seat Belt Yes     Self-Exams Yes     Parent/sibling w/ CABG, MI or angioplasty before 65F 55M? No   Social History Narrative     Not on file     Social Determinants of Health     Financial Resource Strain: Not on file   Food Insecurity: Not on file   Transportation Needs: Not on file   Physical Activity: Not on file   Stress: Not on file   Social Connections: Not on file   Intimate Partner Violence: Not on file   Housing Stability: Not on file       FAMILY HISTORY:  Family History   Problem Relation Age of Onset     Other - See Comments Mother      Diabetes Maternal Grandfather      Heart Disease Paternal Grandfather         MI     Diabetes Other      Colon Cancer No family hx of        Past/family/social history reviewed and no changes    PHYSICAL EXAMINATION:  Constitutional: aaox3, cooperative, pleasant, not dyspneic/diaphoretic, no acute distress  Vitals reviewed: There were no vitals taken for this visit.  Wt:   Wt Readings from Last 2 Encounters:   07/22/20 85.3 kg (188 lb 2 oz)   07/01/20 83.9 kg (185 lb)     This was a virtual visit. No physical exam done.       PERTINENT  STUDIES:    Lab Requisition on 02/16/2022   Component Date Value Ref Range Status     Influenza A PCR 02/16/2022 Negative  Negative Final     Influenza B PCR 02/16/2022 Negative  Negative Final     RSV PCR 02/16/2022 Negative  Negative Final     SARS CoV2 PCR 02/16/2022 Negative  Negative Final

## 2022-02-23 NOTE — LETTER
2/23/2022         RE: Jese Joe  [address] [facility] [address]        Dear Colleague,    Thank you for referring your patient, Jese Joe, to the [facility] [place]. Please see a copy of my visit note below.    Jese is a 47 year old who is being evaluated via a billable video visit.      How would you like to obtain your AVS? Mail a copy  If the video visit is dropped, the invitation should be resent by: Text to cell phone: [phone]  Will anyone else be joining your video visit? No      Video Start Time: 9 am  Video-Visit Details    Type of service:  Video Visit    Video End Time:9:45 am    Originating Location (pt. Location): Home    Distant Location (provider location):  [facility] [place]     Platform used for Video Visit: [employer]         GI CLINIC VISIT    CC/REFERRING MD:  No ref. provider found  REASON FOR CONSULTATION:   No ref. provider found for   Chief Complaint   Patient presents with     Video Visit       ASSESSMENT/PLAN:    47 year old male patient with a hx of SBO involving the terminal ileum concerning for Crohns disease s/p colonoscopy in 2019 which revealed multiple ulcers in the distal ileum biopsied- pathology report revealed focal erosion and superficial cryptitis.      Patient is presenting today for follow up. He states he has been doing well since he was last seen in 2019. He was under the impression he did not need to follow up until now. He states he has been doing well with no new, alarming or concerning symptoms.     He reports ongoing mild abdominal pain but nothing significant enough to keep him from his usual daily activities. He also reports an average of 3-4 non bloody BMs daily. He denies weight loss, urgency, incontinence, mucus in stool, fever, nausea, vomiting, or  abdominal distention.   He report quitting tobacco use about a year ago. He states Sept 2021 was his 1 year anniversary.   Patient states he has been doing well otherwise.     We will like to repeat a colonoscopy today since patient has not had one in over 2 years we will decide on next steps in management after colonoscopy     Plan:   -Proceed with colonoscopy evaluation, decision on next steps in management after colonoscopy   -We will see you back in clinic in 3 months for follow up.   -Please continue to avoid NSAID use ( Ibuprofen, Aleve, Advil, Excedrin etc)    RTC: Return in about 3 months (around 5/23/2022).       45 minutes spent on the date of the encounter performing chart review, history and exam, documentation and further activities as noted above.  .      Royal Benoit MD  Gastroenterology Fellow  Division of Gastroenterology, Hepatology and Nutrition  [facility]  P:         HPI  47 year old male patient with a hx of SBO involving the terminal ileum concerning for Crohns disease s/p colonoscopy in 2019 which revealed multiple ulcers in the distal ileum biopsied- pathology report revealed focal erosion and superficial cryptitis.      Patient presenting today for follow up. He states he has been doing well since he was last seen in 2019. He was under the impression he did not need to follow up until now. He states he has been doing well with no new, alarming or concerning symptoms.     He reports ongoing mild abdominal pain but nothing significant enough to keep him from his usual daily activities. He also reports an average of 3-4 non bloody BMs daily. He denies weight loss, urgency, incontinence, mucus in stool, fever, nausea, vomiting, or  abdominal distention.   He report quitting tobacco use about a year ago. He states Sept 2021 was his 1 year anniversary.  Patient states he has been doing well otherwise.       ROS:    No fevers or chills  No weight loss  No blurry vision, double vision or change in vision  No sore throat  No lymphadenopathy  No headache, paraesthesias, or weakness in a  limb  No shortness of breath or wheezing  No chest pain or pressure  No arthralgias or myalgias  No rashes or skin changes  No odynophagia or dysphagia  No BRBPR, hematochezia, melena  No dysuria, frequency or urgency  No hot/cold intolerance or polyria  No anxiety or depression    PROBLEM LIST  Patient Active Problem List    Diagnosis Date Noted     Chronic, continuous use of opioids 09/23/2019     Priority: Medium     SBO (small bowel obstruction) (H) 03/26/2019     Priority: Medium     ACP (advance care planning) 07/31/2017     Priority: Medium     Advance Care Planning 7/31/2017: ACP Review of Chart / Resources Provided:  Reviewed chart for advance care plan.  Jese Joe has no plan or code status on file. Discussed available resources and provided with information.   Added by Indira Santoro                 PERTINENT PAST MEDICAL HISTORY:  Past Medical History:   Diagnosis Date     History of steroid therapy      Stomach problems        PREVIOUS SURGERIES:  Past Surgical History:   Procedure Laterality Date     COLONOSCOPY N/A 6/18/2019    Procedure: COLONOSCOPY, WITH POLYPECTOMY AND BIOPSY;  Surgeon: Urszula Vides MD;  Location: [facility] OR     COLONOSCOPY N/A 3/9/2020    Procedure: COLONOSCOPY, WITH POLYPECTOMY AND BIOPSY;  Surgeon: Urszula Vides MD;  Location: [facility] OR     ESOPHAGOSCOPY, GASTROSCOPY, DUODENOSCOPY (EGD), COMBINED N/A 6/18/2019    Procedure: ESOPHAGOGASTRODUODENOSCOPY, WITH BIOPSY;  Surgeon: Urszula Vides MD;  Location: [facility] OR     GENITOURINARY SURGERY       NO SURGICAL HISTORY[         PREVIOUS ENDOSCOPY:    Colonoscopy June 2019                                                                  Impression:          - Multiple ulcers in the distal ileum and in the                        terminal ileum. Biopsied. Differential is Crohn's                        disease vs. NSAID-induced enteropathy.                        - Two 7 to 10 mm polyps in the transverse colon and in                         the ascending colon, removed with a cold snare. Resected                        and retrieved.                        - Diverticulosis in the sigmoid colon.                        - Ring-like mucosa in the distal rectum. Biopsied.                        - Non-bleeding internal hemorrhoids.     ALLERGIES:     Allergies   Allergen Reactions     Cleocin Hives       PERTINENT MEDICATIONS:    Current Outpatient Medications:      Acetaminophen (TYLENOL PO), Take 1,000 mg by mouth every 6 hours as needed , Disp: , Rfl:      albuterol (PROAIR HFA/PROVENTIL HFA/VENTOLIN HFA) 108 (90 Base) MCG/ACT inhaler, Inhale 2 puffs into the lungs every 6 hours as needed for shortness of breath / dyspnea or wheezing, Disp: 18 g, Rfl: 1     albuterol (PROAIR HFA/PROVENTIL HFA/VENTOLIN HFA) 108 (90 Base) MCG/ACT inhaler, Inhale 2 puffs into the lungs every 4 hours as needed for shortness of breath / dyspnea or wheezing, Disp: 1 Inhaler, Rfl: 3     ALPRAZolam (XANAX) 0.5 MG tablet, TAKE 1 TABLET BY MOUTH 2 TIMES A DAY AS NEEDED FOR ANXIETY, Disp: 20 tablet, Rfl: 0     budesonide (ENTOCORT EC) 3 MG EC capsule, Take 2 capsules (6 mg) by mouth every morning (Patient not taking: Reported on 2/13/2020), Disp: 60 capsule, Rfl: 1     cetirizine (ZYRTEC) 10 MG tablet, Take 10 mg by mouth daily, Disp: , Rfl:      fluticasone (FLONASE) 50 MCG/ACT nasal spray, SPRAY 1 TO 2 SPRAYS INTO BOTH NOSTRILS DAILY, Disp: 16 g, Rfl: 0     HYDROcodone-acetaminophen (NORCO) 5-325 MG tablet, TAKE 1 TABLET BY MOUTH 2 TIMES A DAY AS NEEDED, Disp: 40 tablet, Rfl: 0     ondansetron (ZOFRAN) 4 MG tablet, Take 1 tablet (4 mg) by mouth every 8 hours as needed for nausea, Disp: 6 tablet, Rfl: 0     ondansetron (ZOFRAN-ODT) 4 MG ODT tab, DISSOLVE 1 TABLET IN MOUTH EVERY 6 HOURS AS NEEDED FOR NAUSEA, Disp: 20 tablet, Rfl: 0     spacer (OPTICHAMBER COREY) holding chamber, Use with inhaler, Disp: 1 each, Rfl: 0     valACYclovir (VALTREX) 1000 mg tablet, TAKE 1 TABLET BY  MOUTH THREE TIMES DAILY FOR 7 DAYS, Disp: 21 tablet, Rfl: 0    SOCIAL HISTORY:  Social History     Socioeconomic History     Marital status:      Spouse name: Not on file     Number of children: Not on file     Years of education: Not on file     Highest education level: Not on file   Occupational History     Not on file   Tobacco Use     Smoking status: Light Tobacco Smoker     Packs/day: 0.25     Years: 33.00     Pack years: 8.25     Types: Cigarettes     Start date: 1/1/1987     Smokeless tobacco: Never Used   Substance and Sexual Activity     Alcohol use: Yes     Comment: occasionally     Drug use: No     Sexual activity: Yes     Partners: Female     Birth control/protection: Male Surgical   Other Topics Concern      Service No     Blood Transfusions Yes     Comment: Permits if needed     Caffeine Concern Yes     Comment: coffee, soda > 6 cups a day     Occupational Exposure No     Hobby Hazards No     Sleep Concern No     Stress Concern No     Weight Concern No     Special Diet No     Back Care Yes     Comment: upper neck     Exercise No     Bike Helmet Not Asked     Seat Belt Yes     Self-Exams Yes     Parent/sibling w/ CABG, MI or angioplasty before 65F 55M? No   Social History Narrative     Not on file     Social Determinants of Health     Financial Resource Strain: Not on file   Food Insecurity: Not on file   Transportation Needs: Not on file   Physical Activity: Not on file   Stress: Not on file   Social Connections: Not on file   Intimate Partner Violence: Not on file   Housing Stability: Not on file       FAMILY HISTORY:  Family History   Problem Relation Age of Onset     Other - See Comments Mother      Diabetes Maternal Grandfather      Heart Disease Paternal Grandfather         MI     Diabetes Other      Colon Cancer No family hx of        Past/family/social history reviewed and no changes    PHYSICAL EXAMINATION:  Constitutional: aaox3, cooperative, pleasant, not dyspneic/diaphoretic,  no acute distress  Vitals reviewed: There were no vitals taken for this visit.  Wt:   Wt Readings from Last 2 Encounters:   07/22/20 85.3 kg (188 lb 2 oz)   07/01/20 83.9 kg (185 lb)     This was a virtual visit. No physical exam done.       PERTINENT STUDIES:    Lab Requisition on 02/16/2022   Component Date Value Ref Range Status     Influenza A PCR 02/16/2022 Negative  Negative Final     Influenza B PCR 02/16/2022 Negative  Negative Final     RSV PCR 02/16/2022 Negative  Negative Final     SARS CoV2 PCR 02/16/2022 Negative  Negative Final

## 2022-02-24 ENCOUNTER — TELEPHONE (OUTPATIENT)
Dept: GASTROENTEROLOGY | Facility: CLINIC | Age: 47
End: 2022-02-24
Payer: COMMERCIAL

## 2022-02-24 NOTE — TELEPHONE ENCOUNTER
Left message for patient to call back to schedule 3 month follow up appointment. Clinic phone number provided.    Enedelia Turcios, Virtual Visit Facilitator     
yes

## 2022-03-15 DIAGNOSIS — F41.9 ANXIETY: ICD-10-CM

## 2022-03-15 DIAGNOSIS — J98.01 BRONCHOSPASM: ICD-10-CM

## 2022-03-15 DIAGNOSIS — M54.2 NECK PAIN: ICD-10-CM

## 2022-03-16 RX ORDER — HYDROCODONE BITARTRATE AND ACETAMINOPHEN 5; 325 MG/1; MG/1
TABLET ORAL
Qty: 40 TABLET | Refills: 0 | Status: SHIPPED | OUTPATIENT
Start: 2022-03-16

## 2022-03-16 RX ORDER — ALPRAZOLAM 0.5 MG
TABLET ORAL
Qty: 20 TABLET | Refills: 0 | Status: SHIPPED | OUTPATIENT
Start: 2022-03-16 | End: 2024-07-16

## 2022-03-16 RX ORDER — FLUTICASONE PROPIONATE 50 MCG
SPRAY, SUSPENSION (ML) NASAL
Qty: 16 G | Refills: 0 | Status: SHIPPED | OUTPATIENT
Start: 2022-03-16 | End: 2022-05-24

## 2022-04-05 ENCOUNTER — OFFICE VISIT (OUTPATIENT)
Dept: OTOLARYNGOLOGY | Facility: OTHER | Age: 47
End: 2022-04-05
Attending: NURSE PRACTITIONER
Payer: COMMERCIAL

## 2022-04-05 VITALS
DIASTOLIC BLOOD PRESSURE: 70 MMHG | HEART RATE: 69 BPM | HEIGHT: 72 IN | SYSTOLIC BLOOD PRESSURE: 106 MMHG | TEMPERATURE: 97.4 F | WEIGHT: 205 LBS | OXYGEN SATURATION: 96 % | RESPIRATION RATE: 16 BRPM | BODY MASS INDEX: 27.77 KG/M2

## 2022-04-05 DIAGNOSIS — H90.5 CONGENITAL HEARING LOSS OF BOTH EARS: Primary | ICD-10-CM

## 2022-04-05 DIAGNOSIS — H90.3 SNHL (SENSORY-NEURAL HEARING LOSS), ASYMMETRICAL: ICD-10-CM

## 2022-04-05 PROCEDURE — 93000 ELECTROCARDIOGRAM COMPLETE: CPT | Performed by: INTERNAL MEDICINE

## 2022-04-05 PROCEDURE — 99213 OFFICE O/P EST LOW 20 MIN: CPT | Performed by: NURSE PRACTITIONER

## 2022-04-05 ASSESSMENT — PAIN SCALES - GENERAL: PAINLEVEL: NO PAIN (0)

## 2022-04-05 NOTE — NURSING NOTE
Chief Complaint   Patient presents with     Consult For     follow up hearing aids       Initial /70 (BP Location: Right arm, Cuff Size: Adult Regular)   Pulse 69   Temp 97.4  F (36.3  C) (Tympanic)   Resp 16   Ht 1.829 m (6')   Wt 93 kg (205 lb)   SpO2 96%   BMI 27.80 kg/m   Estimated body mass index is 27.8 kg/m  as calculated from the following:    Height as of this encounter: 1.829 m (6').    Weight as of this encounter: 93 kg (205 lb).  Medication Reconciliation: complete  Sammie Hurley LPN

## 2022-04-05 NOTE — PATIENT INSTRUCTIONS
Complete MRI of the inner ear and brain  Complete EKG  Complete new Audiogram    Once the above is completed, will clear you for hearing aids      Thank you for allowing Val WYNNE and our ENT team to participate in your care.  If your medications are too expensive, please call my nurse at the number listed below.  We can possibly change this medication.    If you have a scheduling or an appointment question please contact our Health Unit Coordinator at their direct line [phone] ext [phone]  ALL nursing questions or concerns can be directed to my Nurse Carol [phone].   '

## 2022-04-05 NOTE — PROGRESS NOTES
Otolaryngology Note         Chief Complaint:     Patient presents with:  Consult For: follow up hearing aids           History of Present Illness:     Jese Joe is a 47 year old male seen today for hearing loss.    He does not notice a difference right to left.   He has constant tinnitus, not terribly bothersome.  He has difficulty hearing with background noises.   Also states he has difficulty hearing certain voice tones, especially small children.    He did not have a lot of ear infections as a child    He reports mom had a normal pregnancy and birth with him. He had some lung issues when he was born, uses albuterol as needed.      + seasonal allergies, uses zyrtec and flonase     No ear pain or drainage  No current vertigo - he did have some vertigo as a child.  He had some mild fatigue and nausea with it.    No history of migraines.      No facial numbness or tingling.   No history of otological surgery    No history of ear surgery or trauma to the ear   + noise exposure, hunting, chainsaws as a child, he did not wear HP as a child, he is now.   No family history of hearing loss  CMP completed 5/15/19 shows normal kidney function  No EKG on file    He has a previous audiogram completed 12/18/14:  Type a tympanograms bilaterally  Thresholds are normal to 2000 Hz steeply sloping to severe asymmetrical sensorineural hearing loss  Word discrimination is 100% on both ears at 55 dB.  No previous hearing aid use.    Audiogram completed 9/18/2020:   Tympanograms are Type A for both ears suggesting normal eardrum mobility.  Acoustic Reflex Thresholds at 1000 Hz are present for both ears absent only ipsilateral left.  Thresholds are decreased 250 Hz by 20dBHL other changes 5-10dBHL slight to normal hearing sloping to severe right and profound  left-asymmetrical sensorineural hearing loss.  Speech reception thresholds are in good agreement with pure tone average.  Word discrimination scores are excellent at  supra-thresholds level/MCL..    Previous imaging:   No previous MRI IAC or brain         Medications:     Current Outpatient Rx   Medication Sig Dispense Refill     Acetaminophen (TYLENOL PO) Take 1,000 mg by mouth every 6 hours as needed        albuterol (PROAIR HFA/PROVENTIL HFA/VENTOLIN HFA) 108 (90 Base) MCG/ACT inhaler Inhale 2 puffs into the lungs every 6 hours as needed for shortness of breath / dyspnea or wheezing 18 g 1     ALPRAZolam (XANAX) 0.5 MG tablet TAKE 1 TABLET BY MOUTH 2 TIMES A DAY AS NEEDED FOR ANXIETY 20 tablet 0     cetirizine (ZYRTEC) 10 MG tablet Take 10 mg by mouth daily       fluticasone (FLONASE) 50 MCG/ACT nasal spray SPRAY 1 TO 2 SPRAYS INTO BOTH NOSTRILS DAILY 16 g 0     HYDROcodone-acetaminophen (NORCO) 5-325 MG tablet TAKE 1 TABLET BY MOUTH 2 TIMES A DAY AS NEEDED 40 tablet 0     ondansetron (ZOFRAN-ODT) 4 MG ODT tab DISSOLVE 1 TABLET IN MOUTH EVERY 6 HOURS AS NEEDED FOR NAUSEA 20 tablet 0     spacer (OPTICHAMBER COREY) holding chamber Use with inhaler 1 each 0     budesonide (ENTOCORT EC) 3 MG EC capsule Take 2 capsules (6 mg) by mouth every morning (Patient not taking: Reported on 2/13/2020) 60 capsule 1            Allergies:     Allergies: Cleocin          Past Medical History:     Past Medical History:   Diagnosis Date     History of steroid therapy      Stomach problems             Past Surgical History:     Past Surgical History:   Procedure Laterality Date     COLONOSCOPY N/A 6/18/2019    Procedure: COLONOSCOPY, WITH POLYPECTOMY AND BIOPSY;  Surgeon: Urszula Vides MD;  [facility]     COLONOSCOPY N/A 3/9/2020    Procedure: COLONOSCOPY, WITH POLYPECTOMY AND BIOPSY;  Surgeon: Urszula Vides MD;  [facility]: [facility] OR     ESOPHAGOSCOPY, GASTROSCOPY, DUODENOSCOPY (EGD), COMBINED N/A 6/18/2019    Procedure: ESOPHAGOGASTRODUODENOSCOPY, WITH BIOPSY;  Surgeon: Urszula Vides MD;  Location: [facility] OR     GENITOURINARY SURGERY       NO SURGICAL HISTORY[              Social History:      Social History     Tobacco Use     Smoking status: Former Smoker     Packs/day: 0.00     Years: 33.00     Pack years: 0.00     Types: Cigarettes     Start date: 1/1/1987     Smokeless tobacco: Never Used   Substance Use Topics     Alcohol use: Yes     Comment: occasionally     Drug use: No            Review of Systems:     ROS: See HPI         Physical Exam:     /70 (BP Location: Right arm, Cuff Size: Adult Regular)   Pulse 69   Temp 97.4  F (36.3  C) (Tympanic)   Resp 16   Ht 1.829 m (6')   Wt 93 kg (205 lb)   SpO2 96%   BMI 27.80 kg/m      General - The patient is well nourished and well developed, and appears to have good nutritional status.  Alert and oriented to person and place, answers questions and cooperates with examination appropriately.   Head and Face - Normocephalic and atraumatic, with no gross asymmetry noted.  The facial nerve is intact, with strong symmetric movements.  Voice and Breathing - The patient was breathing comfortably without the use of accessory muscles. There was no wheezing, stridor. The patients voice was clear and strong, and had appropriate pitch and quality.  Ears - External ear normal. Canals are patent. Right tympanic membrane is intact without effusion, retraction or mass. Left tympanic membrane is intact without effusion, retraction or mass.  Eyes - Extraocular movements intact, sclera were not icteric or injected, conjunctiva were pink and moist.  Mouth - Examination of the oral cavity showed pink, healthy oral mucosa. Dentition in good condition. No lesions or ulcerations noted. The tongue was mobile and midline.   Throat - The walls of the oropharynx were smooth, pink, moist, symmetric, and had no lesions or ulcerations.  The tonsillar pillars and soft palate were symmetric. The uvula was midline on elevation.    Neck - Normal midline excursion of the laryngotracheal complex during swallowing.  Full range of motion on passive movement.  Palpation of the  occipital, submental, submandibular, internal jugular chain, and supraclavicular nodes did not demonstrate any abnormal lymph nodes or masses.  Palpation of the thyroid was soft and smooth, with no nodules or goiter appreciated.  The trachea was mobile and midline.  Nose - External contour is symmetric, no gross deflection or scars.  Nasal mucosa is pink and moist with no abnormal mucus.  The septum and turbinates were evaluated with nasal speculum, no polyps, masses, or purulence noted on examination.         Assessment and Plan:       ICD-10-CM    1. Congenital hearing loss of both ears  H90.5 MR Internal Auditory Canal wo&w Contrast     EKG 12-lead complete w/read - (Clinic Performed)   2. SNHL (sensory-neural hearing loss), asymmetrical  H90.3 MR Internal Auditory Canal wo&w Contrast     EKG 12-lead complete w/read - (Clinic Performed)     Complete MRI of the inner ear and brain  Complete EKG  Complete new Audiogram    Once the above is completed, will clear you for hearing aids    Val WYNNE  [facility] ENT

## 2022-04-05 NOTE — LETTER
4/5/2022         RE: Jese Joe  [address] [facility] [address]        Dear Colleague,    Thank you for referring your patient, Jese Joe, to the [facility]. Please see a copy of my visit note below.      Otolaryngology Note         Chief Complaint:     Patient presents with:  Consult For: follow up hearing aids           History of Present Illness:     Jese Joe is a 47 year old male seen today for hearing loss.    He does not notice a difference right to left.   He has constant tinnitus, not terribly bothersome.  He has difficulty hearing with background noises.   Also states he has difficulty hearing certain voice tones, especially small children.    He did not have a lot of ear infections as a child    He reports mom had a normal pregnancy and birth with him. He had some lung issues when he was born, uses albuterol as needed.      + seasonal allergies, uses zyrtec and flonase     No ear pain or drainage  No current vertigo - he did have some vertigo as a child.  He had some mild fatigue and nausea with it.    No history of migraines.      No facial numbness or tingling.   No history of otological surgery    No history of ear surgery or trauma to the ear   + noise exposure, hunting, chainsaws as a child, he did not wear HP as a child, he is now.   No family history of hearing loss  CMP completed 5/15/19 shows normal kidney function  No EKG on file    He has a previous audiogram completed 12/18/14:  Type a tympanograms bilaterally  Thresholds are normal to 2000 Hz steeply sloping to severe asymmetrical sensorineural hearing loss  Word discrimination is 100% on both ears at 55 dB.  No previous hearing aid use.    Audiogram completed 9/18/2020:   Tympanograms are Type A for both ears suggesting normal eardrum mobility.  Acoustic Reflex Thresholds at 1000 Hz are present for both ears absent only ipsilateral left.  Thresholds are decreased 250 Hz by 20dBHL other changes  5-10dBHL slight to normal hearing sloping to severe right and profound  left-asymmetrical sensorineural hearing loss.  Speech reception thresholds are in good agreement with pure tone average.  Word discrimination scores are excellent at supra-thresholds level/MCL..    Previous imaging:   No previous MRI IAC or brain         Medications:     Current Outpatient Rx   Medication Sig Dispense Refill     Acetaminophen (TYLENOL PO) Take 1,000 mg by mouth every 6 hours as needed        albuterol (PROAIR HFA/PROVENTIL HFA/VENTOLIN HFA) 108 (90 Base) MCG/ACT inhaler Inhale 2 puffs into the lungs every 6 hours as needed for shortness of breath / dyspnea or wheezing 18 g 1     ALPRAZolam (XANAX) 0.5 MG tablet TAKE 1 TABLET BY MOUTH 2 TIMES A DAY AS NEEDED FOR ANXIETY 20 tablet 0     cetirizine (ZYRTEC) 10 MG tablet Take 10 mg by mouth daily       fluticasone (FLONASE) 50 MCG/ACT nasal spray SPRAY 1 TO 2 SPRAYS INTO BOTH NOSTRILS DAILY 16 g 0     HYDROcodone-acetaminophen (NORCO) 5-325 MG tablet TAKE 1 TABLET BY MOUTH 2 TIMES A DAY AS NEEDED 40 tablet 0     ondansetron (ZOFRAN-ODT) 4 MG ODT tab DISSOLVE 1 TABLET IN MOUTH EVERY 6 HOURS AS NEEDED FOR NAUSEA 20 tablet 0     spacer (OPTICHAMBER COREY) holding chamber Use with inhaler 1 each 0     budesonide (ENTOCORT EC) 3 MG EC capsule Take 2 capsules (6 mg) by mouth every morning (Patient not taking: Reported on 2/13/2020) 60 capsule 1            Allergies:     Allergies: Cleocin          Past Medical History:     Past Medical History:   Diagnosis Date     History of steroid therapy      Stomach problems             Past Surgical History:     Past Surgical History:   Procedure Laterality Date     COLONOSCOPY N/A 6/18/2019    Procedure: COLONOSCOPY, WITH POLYPECTOMY AND BIOPSY;  Surgeon: Urszula Vides MD;  Location: [facility] OR     COLONOSCOPY N/A 3/9/2020    Procedure: COLONOSCOPY, WITH POLYPECTOMY AND BIOPSY;  Surgeon: Urszula Vides MD;  Location: [facility] OR     ESOPHAGOSCOPY,  GASTROSCOPY, DUODENOSCOPY (EGD), COMBINED N/A 6/18/2019    Procedure: ESOPHAGOGASTRODUODENOSCOPY, WITH BIOPSY;  Surgeon: Urszula Vides MD;  Location: [facility] OR     GENITOURINARY SURGERY       NO SURGICAL HISTORY[              Social History:     Social History     Tobacco Use     Smoking status: Former Smoker     Packs/day: 0.00     Years: 33.00     Pack years: 0.00     Types: Cigarettes     Start date: 1/1/1987     Smokeless tobacco: Never Used   Substance Use Topics     Alcohol use: Yes     Comment: occasionally     Drug use: No            Review of Systems:     ROS: See HPI         Physical Exam:     /70 (BP Location: Right arm, Cuff Size: Adult Regular)   Pulse 69   Temp 97.4  F (36.3  C) (Tympanic)   Resp 16   Ht 1.829 m (6')   Wt 93 kg (205 lb)   SpO2 96%   BMI 27.80 kg/m      General - The patient is well nourished and well developed, and appears to have good nutritional status.  Alert and oriented to person and place, answers questions and cooperates with examination appropriately.   Head and Face - Normocephalic and atraumatic, with no gross asymmetry noted.  The facial nerve is intact, with strong symmetric movements.  Voice and Breathing - The patient was breathing comfortably without the use of accessory muscles. There was no wheezing, stridor. The patients voice was clear and strong, and had appropriate pitch and quality.  Ears - External ear normal. Canals are patent. Right tympanic membrane is intact without effusion, retraction or mass. Left tympanic membrane is intact without effusion, retraction or mass.  Eyes - Extraocular movements intact, sclera were not icteric or injected, conjunctiva were pink and moist.  Mouth - Examination of the oral cavity showed pink, healthy oral mucosa. Dentition in good condition. No lesions or ulcerations noted. The tongue was mobile and midline.   Throat - The walls of the oropharynx were smooth, pink, moist, symmetric, and had no lesions or ulcerations.   The tonsillar pillars and soft palate were symmetric. The uvula was midline on elevation.    Neck - Normal midline excursion of the laryngotracheal complex during swallowing.  Full range of motion on passive movement.  Palpation of the occipital, submental, submandibular, internal jugular chain, and supraclavicular nodes did not demonstrate any abnormal lymph nodes or masses.  Palpation of the thyroid was soft and smooth, with no nodules or goiter appreciated.  The trachea was mobile and midline.  Nose - External contour is symmetric, no gross deflection or scars.  Nasal mucosa is pink and moist with no abnormal mucus.  The septum and turbinates were evaluated with nasal speculum, no polyps, masses, or purulence noted on examination.         Assessment and Plan:       ICD-10-CM    1. Congenital hearing loss of both ears  H90.5 MR Internal Auditory Canal wo&w Contrast     EKG 12-lead complete w/read - (Clinic Performed)   2. SNHL (sensory-neural hearing loss), asymmetrical  H90.3 MR Internal Auditory Canal wo&w Contrast     EKG 12-lead complete w/read - (Clinic Performed)     Complete MRI of the inner ear and brain  Complete EKG  Complete new Audiogram    Once the above is completed, will clear you for hearing aids    Val WYNNE  [facility]        Again, thank you for allowing me to participate in the care of your patient.        Sincerely,        Val Mcgee NP

## 2022-05-14 ENCOUNTER — HEALTH MAINTENANCE LETTER (OUTPATIENT)
Age: 47
End: 2022-05-14

## 2022-05-23 DIAGNOSIS — J98.01 BRONCHOSPASM: ICD-10-CM

## 2022-05-24 RX ORDER — FLUTICASONE PROPIONATE 50 MCG
SPRAY, SUSPENSION (ML) NASAL
Qty: 16 G | Refills: 0 | Status: SHIPPED | OUTPATIENT
Start: 2022-05-24 | End: 2022-09-15

## 2022-05-24 NOTE — TELEPHONE ENCOUNTER
flonase       Last Written Prescription Date:  3/16/22  Last Fill Quantity: 16 g,   # refills: 0  Last Office Visit: 7/22/2020  Future Office visit:       Routing refill request to provider for review/approval because:

## 2022-07-15 ENCOUNTER — LAB REQUISITION (OUTPATIENT)
Dept: LAB | Facility: HOSPITAL | Age: 47
End: 2022-07-15

## 2022-07-15 ENCOUNTER — LAB (OUTPATIENT)
Dept: OCCUPATIONAL MEDICINE | Facility: OTHER | Age: 47
End: 2022-07-15
Attending: FAMILY MEDICINE

## 2022-07-15 PROCEDURE — 87637 SARSCOV2&INF A&B&RSV AMP PRB: CPT | Performed by: FAMILY MEDICINE

## 2022-07-17 ENCOUNTER — HOSPITAL ENCOUNTER (EMERGENCY)
Facility: HOSPITAL | Age: 47
Discharge: HOME | End: 2022-07-17
Attending: NURSE PRACTITIONER | Admitting: NURSE PRACTITIONER
Payer: COMMERCIAL

## 2022-07-17 VITALS
OXYGEN SATURATION: 96 % | DIASTOLIC BLOOD PRESSURE: 83 MMHG | HEART RATE: 83 BPM | SYSTOLIC BLOOD PRESSURE: 138 MMHG | TEMPERATURE: 98.4 F | RESPIRATION RATE: 16 BRPM

## 2022-07-17 DIAGNOSIS — Z20.822 ENCOUNTER FOR LABORATORY TESTING FOR COVID-19 VIRUS: ICD-10-CM

## 2022-07-17 DIAGNOSIS — J06.9 URI (UPPER RESPIRATORY INFECTION): ICD-10-CM

## 2022-07-17 LAB
BASOPHILS # BLD AUTO: 0 10E3/UL (ref 0–0.2)
BASOPHILS NFR BLD AUTO: 1 %
EOSINOPHIL # BLD AUTO: 0.3 10E3/UL (ref 0–0.7)
EOSINOPHIL NFR BLD AUTO: 4 %
ERYTHROCYTE [DISTWIDTH] IN BLOOD BY AUTOMATED COUNT: 12.5 % (ref 10–15)
FLUAV RNA SPEC QL NAA+PROBE: NEGATIVE
FLUBV RNA RESP QL NAA+PROBE: NEGATIVE
HCT VFR BLD AUTO: 44.6 % (ref 40–53)
HGB BLD-MCNC: 14.9 G/DL (ref 13.3–17.7)
IMM GRANULOCYTES # BLD: 0 10E3/UL
IMM GRANULOCYTES NFR BLD: 0 %
LYMPHOCYTES # BLD AUTO: 1.3 10E3/UL (ref 0.8–5.3)
LYMPHOCYTES NFR BLD AUTO: 21 %
MCH RBC QN AUTO: 29.7 PG (ref 26.5–33)
MCHC RBC AUTO-ENTMCNC: 33.4 G/DL (ref 31.5–36.5)
MCV RBC AUTO: 89 FL (ref 78–100)
MONOCYTES # BLD AUTO: 0.8 10E3/UL (ref 0–1.3)
MONOCYTES NFR BLD AUTO: 13 %
NEUTROPHILS # BLD AUTO: 4 10E3/UL (ref 1.6–8.3)
NEUTROPHILS NFR BLD AUTO: 61 %
NRBC # BLD AUTO: 0 10E3/UL
NRBC BLD AUTO-RTO: 0 /100
PLATELET # BLD AUTO: 232 10E3/UL (ref 150–450)
RBC # BLD AUTO: 5.01 10E6/UL (ref 4.4–5.9)
RSV RNA SPEC NAA+PROBE: NEGATIVE
SARS-COV-2 RNA RESP QL NAA+PROBE: NEGATIVE
WBC # BLD AUTO: 6.5 10E3/UL (ref 4–11)

## 2022-07-17 PROCEDURE — G0463 HOSPITAL OUTPT CLINIC VISIT: HCPCS

## 2022-07-17 PROCEDURE — 99213 OFFICE O/P EST LOW 20 MIN: CPT | Performed by: NURSE PRACTITIONER

## 2022-07-17 PROCEDURE — 87637 SARSCOV2&INF A&B&RSV AMP PRB: CPT | Performed by: NURSE PRACTITIONER

## 2022-07-17 PROCEDURE — 36415 COLL VENOUS BLD VENIPUNCTURE: CPT | Performed by: NURSE PRACTITIONER

## 2022-07-17 PROCEDURE — C9803 HOPD COVID-19 SPEC COLLECT: HCPCS

## 2022-07-17 PROCEDURE — 85025 COMPLETE CBC W/AUTO DIFF WBC: CPT | Performed by: NURSE PRACTITIONER

## 2022-07-17 PROCEDURE — 87476 LYME DIS DNA AMP PROBE: CPT | Performed by: NURSE PRACTITIONER

## 2022-07-17 ASSESSMENT — ENCOUNTER SYMPTOMS
LIGHT-HEADEDNESS: 0
DIZZINESS: 0
NAUSEA: 0
SINUS PAIN: 0
SORE THROAT: 0
CHILLS: 1
FATIGUE: 1
VOMITING: 0
EYES NEGATIVE: 1
SHORTNESS OF BREATH: 0
ACTIVITY CHANGE: 1
RHINORRHEA: 0
SINUS PRESSURE: 0
APPETITE CHANGE: 0
FEVER: 1
DIARRHEA: 0
HEADACHES: 1
COUGH: 1

## 2022-07-17 NOTE — ED PROVIDER NOTES
History     Chief Complaint   Patient presents with     Covid 19 Testing     HPI  Jese Joe is a 47 year old male who is accompanied per his girlfriend.  He presents with a 3-day history of chills, fatigue, fever of 102 at home, nasal congestion, cough, and a headache.  Has taken Tylenol and ibuprofen at home this morning.  Did decrease his symptoms.  No known sick contacts, however works in medical profession.  Had a negative COVID test this past Friday.  Occasional smoker.  Had first COVID-vaccine booster January, 2022.  Eating and drinking well.  No concerns regarding urination.  Denies nausea, vomiting, diarrhea, shortness of breath, dizziness, and lightheadedness.    Allergies:  Allergies   Allergen Reactions     Cleocin Hives       Problem List:    Patient Active Problem List    Diagnosis Date Noted     Chronic, continuous use of opioids 09/23/2019     Priority: Medium     SBO (small bowel obstruction) (H) 03/26/2019     Priority: Medium     ACP (advance care planning) 07/31/2017     Priority: Medium     Advance Care Planning 7/31/2017: ACP Review of Chart / Resources Provided:  Reviewed chart for advance care plan.  Jese Joe has no plan or code status on file. Discussed available resources and provided with information.   Added by Indira Santoro                  Past Medical History:    Past Medical History:   Diagnosis Date     History of steroid therapy      Stomach problems        Past Surgical History:    Past Surgical History:   Procedure Laterality Date     COLONOSCOPY N/A 6/18/2019    Procedure: COLONOSCOPY, WITH POLYPECTOMY AND BIOPSY;  Surgeon: Urszula Vides MD;  [facility]: [facility]     COLONOSCOPY N/A 3/9/2020    Procedure: COLONOSCOPY, WITH POLYPECTOMY AND BIOPSY;  Surgeon: Urszula Vides MD;  Location:  OR     ESOPHAGOSCOPY, GASTROSCOPY, DUODENOSCOPY (EGD), COMBINED N/A 6/18/2019    Procedure: ESOPHAGOGASTRODUODENOSCOPY, WITH BIOPSY;  Surgeon: Urszula Vides MD;  Location:  OR      GENITOURINARY SURGERY       NO SURGICAL HISTORY[         Family History:    Family History   Problem Relation Age of Onset     Other - See Comments Mother      Diabetes Maternal Grandfather      Heart Disease Paternal Grandfather         MI     Diabetes Other      Colon Cancer No family hx of        Social History:  Marital Status:   [2]  Social History     Tobacco Use     Smoking status: Former Smoker     Packs/day: 0.00     Years: 33.00     Pack years: 0.00     Types: Cigarettes     Start date: 1/1/1987     Smokeless tobacco: Never Used   Substance Use Topics     Alcohol use: Yes     Comment: occasionally     Drug use: No        Medications:    Acetaminophen (TYLENOL PO)  albuterol (PROAIR HFA/PROVENTIL HFA/VENTOLIN HFA) 108 (90 Base) MCG/ACT inhaler  ALPRAZolam (XANAX) 0.5 MG tablet  cetirizine (ZYRTEC) 10 MG tablet  fluticasone (FLONASE) 50 MCG/ACT nasal spray  HYDROcodone-acetaminophen (NORCO) 5-325 MG tablet  ondansetron (ZOFRAN-ODT) 4 MG ODT tab  budesonide (ENTOCORT EC) 3 MG EC capsule  spacer (OPTICHAMBER COREY) holding chamber          Review of Systems   Constitutional: Positive for activity change, chills, fatigue and fever (102 at home). Negative for appetite change.   HENT: Positive for congestion (nasal ). Negative for ear pain, rhinorrhea, sinus pressure, sinus pain and sore throat.    Eyes: Negative.    Respiratory: Positive for cough. Negative for shortness of breath.    Gastrointestinal: Negative for diarrhea, nausea and vomiting.   Genitourinary: Negative.    Musculoskeletal:        Increased joint pain   Skin: Negative.    Neurological: Positive for headaches. Negative for dizziness and light-headedness.       Physical Exam   BP: (!) 157/108  Pulse: 84  Temp: 98.4  F (36.9  C)  Resp: 16  SpO2: 96 %      Physical Exam  Vitals and nursing note reviewed.   Constitutional:       General: He is not in acute distress.  HENT:      Head: Normocephalic.      Right Ear: Tympanic membrane and  ear canal normal.      Left Ear: Tympanic membrane and ear canal normal.      Ears:      Comments: Moderate to large  amount of clear fluid behind bilateral tympanic membrane/s.       Nose: Nose normal.      Right Sinus: No maxillary sinus tenderness or frontal sinus tenderness.      Left Sinus: No maxillary sinus tenderness or frontal sinus tenderness.      Mouth/Throat:      Lips: Pink.      Mouth: Mucous membranes are moist.      Pharynx: Uvula midline. No posterior oropharyngeal erythema.      Comments: large amount of translucent post nasal drip noted posterior oropharynx    Eyes:      Conjunctiva/sclera: Conjunctivae normal.   Cardiovascular:      Rate and Rhythm: Normal rate and regular rhythm.      Heart sounds: Normal heart sounds. No murmur heard.  Pulmonary:      Effort: Pulmonary effort is normal. No respiratory distress.      Breath sounds: Normal breath sounds. No wheezing.   Lymphadenopathy:      Cervical: No cervical adenopathy.   Skin:     General: Skin is warm and dry.      Coloration: Skin is pale.   Neurological:      Mental Status: He is alert and oriented to person, place, and time.   Psychiatric:         Behavior: Behavior normal.         ED Course                 Procedures             Results for orders placed or performed during the hospital encounter of 07/17/22 (from the past 24 hour(s))   Asymptomatic Influenza A/B & SARS-CoV2 (COVID-19) Virus PCR Multiplex Nasopharyngeal    Specimen: Nasopharyngeal; Swab   Result Value Ref Range    Influenza A PCR Negative Negative    Influenza B PCR Negative Negative    RSV PCR Negative Negative    SARS CoV2 PCR Negative Negative    Narrative    Testing was performed using the Xpert Xpress CoV2/Flu/RSV Assay on the [employer] GeneXpert Instrument. This test should be ordered for the detection of SARS-CoV-2 and influenza viruses in individuals who meet clinical and/or epidemiological criteria. Test performance is unknown in asymptomatic patients. This test  is for in vitro diagnostic use under the FDA EUA for laboratories certified under CLIA to perform high or moderate complexity testing. This test has not been FDA cleared or approved. A negative result does not rule out the presence of PCR inhibitors in the specimen or target RNA in concentration below the limit of detection for the assay. If only one viral target is positive but coinfection with multiple targets is suspected, the sample should be re-tested with another FDA cleared, approved, or authorized test, if coinfection would change clinical management. This test was validated by the [facility]. These laboratories are certified under the Clinical  Laboratory Improvement Amendments of 1988 (CLIA-88) as qualified to perform high complexity laboratory testing.   CBC with platelets differential    Narrative    The following orders were created for panel order CBC with platelets differential.  Procedure                               Abnormality         Status                     ---------                               -----------         ------                     CBC with platelets and d...[[id number]]                      Final result                 Please view results for these tests on the individual orders.   CBC with platelets and differential   Result Value Ref Range    WBC Count 6.5 4.0 - 11.0 10e3/uL    RBC Count 5.01 4.40 - 5.90 10e6/uL    Hemoglobin 14.9 13.3 - 17.7 g/dL    Hematocrit 44.6 40.0 - 53.0 %    MCV 89 78 - 100 fL    MCH 29.7 26.5 - 33.0 pg    MCHC 33.4 31.5 - 36.5 g/dL    RDW 12.5 10.0 - 15.0 %    Platelet Count 232 150 - 450 10e3/uL    % Neutrophils 61 %    % Lymphocytes 21 %    % Monocytes 13 %    % Eosinophils 4 %    % Basophils 1 %    % Immature Granulocytes 0 %    NRBCs per 100 WBC 0 <1 /100    Absolute Neutrophils 4.0 1.6 - 8.3 10e3/uL    Absolute Lymphocytes 1.3 0.8 - 5.3 10e3/uL    Absolute Monocytes 0.8 0.0 - 1.3 10e3/uL    Absolute Eosinophils 0.3 0.0 - 0.7  10e3/uL    Absolute Basophils 0.0 0.0 - 0.2 10e3/uL    Absolute Immature Granulocytes 0.0 <=0.4 10e3/uL    Absolute NRBCs 0.0 10e3/uL       Medications - No data to display    Assessments & Plan (with Medical Decision Making)     I have reviewed the nursing notes.    I have reviewed the findings, diagnosis, plan and need for follow up with the patient.  (Z13.991) Encounter for laboratory testing for COVID-19 virus    (J06.9) URI (upper respiratory infection)  Comment: 47 year old male who is accompanied per his girlfriend.  He presents with a 3-day history of chills, fatigue, fever of 102 at home, nasal congestion, cough, and a headache.  Has taken Tylenol and ibuprofen at home this morning.  Did decrease his symptoms.  No known sick contacts, however works in medical profession.  Had a negative COVID test this past Friday.  Occasional smoker.  Had first COVID-vaccine booster January, 2022.  Eating and drinking well.  No concerns regarding urination.  Denies nausea, vomiting, diarrhea, shortness of breath, dizziness, and lightheadedness.    MDM: NHT. Lungs CTA  Multiplex nasopharyngeal swab test results negative  CBC negative  Lyme's titer pending    Plan: Education provided for viral URI.  Work note provided.  Treat symptoms conservatively with acetaminophen and  ibuprofen (if applicable) for fevers, body aches, and headaches, guaifenesin and/or honey for cough. May use chest rubs for sore throat and congestion, hot and cold liquids may help decrease sore throat and help you feel better. Increase fluids. You may utilize pseudoephedrine for congestion. Return to be reevaluated by ER/[facility] or your primary care provider if symptoms worsen, you develop breathing difficulties, or you do not improve in a reasonable time frame. It can take several days for a cough to resolve. It can take ten to fourteen days for upper respiratory symptoms to resolve.   These discharge instructions and medications were reviewed with him and  girlfriend and understanding verbalized.    This document was prepared using a combination of typing and voice generated software.  While every attempt was made for accuracy, spelling and grammatical errors may exist.    Discharge Medication List as of 7/17/2022 12:58 PM          Final diagnoses:   Encounter for laboratory testing for COVID-19 virus   URI (upper respiratory infection)       7/17/2022   [facility]       Laurence Barahona, CNP  07/17/22 1833

## 2022-07-17 NOTE — Clinical Note
Jese Joe was seen and treated in our emergency department on 7/17/2022.  He may return to work on 07/19/2022.  Please excuse due to illness started 7/15. May return to work 7/19/2022. Evaluated in Urgent Care     If you have any questions or concerns, please don't hesitate to call.      Laurence Barahona, CNP

## 2022-07-17 NOTE — Clinical Note
Jese Joe was seen and treated in our emergency department on 7/17/2022.  He may return to work on 07/19/2022.  Evaluated in Urgent Care     If you have any questions or concerns, please don't hesitate to call.      Laurence Barahona, CNP

## 2022-07-17 NOTE — DISCHARGE INSTRUCTIONS
Increase oral intake, cool mist vaporizer as needed, rest, avoid sharing utensils, practice good hand washing techniques, cover mouth when you cough and sneeze. Throw toothbursh away 24 hours after starting antibiotics.  Over the counter medications such as ibuprofen and/or acetaminophen for fever and generalized aches and pains. Ibuprofen 400 to 800 mg (2 - 4 tabs of over the counter med) every six to eight hours as needed;not to exceed maximum amount of 3200 mg in 24 hours.Tylenol 650 to 1000 mg every four to six hours as needed (not to exceed more than 4000 mg in a 24 hour period). May use interchangeably. Robitussin (guaifenesin) for cough. Chest rubs such as Ren's or Mentholatum may help reduce sore throat symptoms.  Chloraseptic spray for sore throat or menthol lozenges may be helpful for sore throat. Be reevaluated if symptoms persist longer than 10 - 14 days or worsen and if there is no improvement in 72 hours or worsening of symptoms.  Increase fluids.     Loratadine (Claritin) or cetirizine (Zyrtec) 20 mg  daily for ten to fourteen days to see if symptoms lessen or resolve. If the medication seems to help you may take 10 mg daily on an ongoing basis.  May buy over the counter.  .  Fluids, herbs, and foods for sore throat relief -- Adjusting the temperature and texture of foods and beverages may provide local relief of sore throat pain. While data showing benefit are quite limited, these approaches are intuitive. We typically advise these measures since they are likely to be safe with minimal adverse effect, and patients often describe relief of symptoms.  We suggest hydration with frozen (eg, ice or popsicles) or heated liquids (eg, teas, soups), rather than room temperature or refrigerated fluids in patient with significant sore throat pain. Very cold foods can have a numbing-like effect that temporarily reduces or alleviates the pain of swallowing. Ice cubes or frozen popsicles facilitate hydration;  ice cream and frozen yogurt provide caloric intake.  Warm fluids and foods, including teas, soups, and soft non-irritating foods, may be better tolerated by patients with throat pain than irritating foods (eg, rough-textured or spicy foods) or fluids at room temperatures. Foods that coat the throat, including honey and hard candies, can facilitate intake of calories while temporarily relieving throat pain.

## 2022-07-20 ENCOUNTER — HOSPITAL ENCOUNTER (EMERGENCY)
Facility: HOSPITAL | Age: 47
Discharge: HOME | End: 2022-07-20
Admitting: PHYSICIAN ASSISTANT
Payer: COMMERCIAL

## 2022-07-20 LAB — GROUP A STREP BY PCR: NOT DETECTED

## 2022-07-20 PROCEDURE — 87637 SARSCOV2&INF A&B&RSV AMP PRB: CPT | Performed by: NURSE PRACTITIONER

## 2022-07-20 PROCEDURE — G0463 HOSPITAL OUTPT CLINIC VISIT: HCPCS

## 2022-07-20 PROCEDURE — 87651 STREP A DNA AMP PROBE: CPT | Performed by: NURSE PRACTITIONER

## 2022-07-22 LAB — B BURGDOR DNA SPEC QL NAA+PROBE: NOT DETECTED

## 2022-07-26 ENCOUNTER — APPOINTMENT (OUTPATIENT)
Dept: GENERAL RADIOLOGY | Facility: HOSPITAL | Age: 47
End: 2022-07-26
Attending: NURSE PRACTITIONER
Payer: COMMERCIAL

## 2022-07-26 ENCOUNTER — HOSPITAL ENCOUNTER (EMERGENCY)
Facility: HOSPITAL | Age: 47
Discharge: HOME | End: 2022-07-26
Attending: NURSE PRACTITIONER | Admitting: NURSE PRACTITIONER
Payer: COMMERCIAL

## 2022-07-26 VITALS
RESPIRATION RATE: 16 BRPM | DIASTOLIC BLOOD PRESSURE: 70 MMHG | SYSTOLIC BLOOD PRESSURE: 132 MMHG | OXYGEN SATURATION: 93 % | HEART RATE: 84 BPM | TEMPERATURE: 97.8 F

## 2022-07-26 DIAGNOSIS — J20.9 ACUTE BRONCHITIS, UNSPECIFIED ORGANISM: ICD-10-CM

## 2022-07-26 LAB
BASOPHILS # BLD AUTO: 0 10E3/UL (ref 0–0.2)
BASOPHILS NFR BLD AUTO: 0 %
EOSINOPHIL # BLD AUTO: 0.2 10E3/UL (ref 0–0.7)
EOSINOPHIL NFR BLD AUTO: 2 %
ERYTHROCYTE [DISTWIDTH] IN BLOOD BY AUTOMATED COUNT: 12.9 % (ref 10–15)
HCT VFR BLD AUTO: 42.7 % (ref 40–53)
HGB BLD-MCNC: 14.4 G/DL (ref 13.3–17.7)
HOLD SPECIMEN: NORMAL
IMM GRANULOCYTES # BLD: 0.1 10E3/UL
IMM GRANULOCYTES NFR BLD: 1 %
LYMPHOCYTES # BLD AUTO: 3.8 10E3/UL (ref 0.8–5.3)
LYMPHOCYTES NFR BLD AUTO: 29 %
MCH RBC QN AUTO: 29.5 PG (ref 26.5–33)
MCHC RBC AUTO-ENTMCNC: 33.7 G/DL (ref 31.5–36.5)
MCV RBC AUTO: 88 FL (ref 78–100)
MONOCYTES # BLD AUTO: 1 10E3/UL (ref 0–1.3)
MONOCYTES NFR BLD AUTO: 7 %
NEUTROPHILS # BLD AUTO: 8.1 10E3/UL (ref 1.6–8.3)
NEUTROPHILS NFR BLD AUTO: 61 %
NRBC # BLD AUTO: 0 10E3/UL
NRBC BLD AUTO-RTO: 0 /100
PLATELET # BLD AUTO: 363 10E3/UL (ref 150–450)
RBC # BLD AUTO: 4.88 10E6/UL (ref 4.4–5.9)
WBC # BLD AUTO: 13.3 10E3/UL (ref 4–11)

## 2022-07-26 PROCEDURE — 99214 OFFICE O/P EST MOD 30 MIN: CPT | Performed by: NURSE PRACTITIONER

## 2022-07-26 PROCEDURE — 94640 AIRWAY INHALATION TREATMENT: CPT

## 2022-07-26 PROCEDURE — 71046 X-RAY EXAM CHEST 2 VIEWS: CPT

## 2022-07-26 PROCEDURE — 250N000009 HC RX 250: Performed by: NURSE PRACTITIONER

## 2022-07-26 PROCEDURE — G0463 HOSPITAL OUTPT CLINIC VISIT: HCPCS | Mod: 25

## 2022-07-26 PROCEDURE — 86753 PROTOZOA ANTIBODY NOS: CPT | Mod: 59 | Performed by: NURSE PRACTITIONER

## 2022-07-26 PROCEDURE — 36415 COLL VENOUS BLD VENIPUNCTURE: CPT | Performed by: NURSE PRACTITIONER

## 2022-07-26 PROCEDURE — 999N000157 HC STATISTIC RCP TIME EA 10 MIN

## 2022-07-26 PROCEDURE — 85025 COMPLETE CBC W/AUTO DIFF WBC: CPT | Performed by: NURSE PRACTITIONER

## 2022-07-26 PROCEDURE — 86666 EHRLICHIA ANTIBODY: CPT | Performed by: NURSE PRACTITIONER

## 2022-07-26 RX ORDER — IPRATROPIUM BROMIDE AND ALBUTEROL SULFATE 2.5; .5 MG/3ML; MG/3ML
3 SOLUTION RESPIRATORY (INHALATION) ONCE
Status: COMPLETED | OUTPATIENT
Start: 2022-07-26 | End: 2022-07-26

## 2022-07-26 RX ORDER — PREDNISONE 20 MG/1
TABLET ORAL
Qty: 3 TABLET | Refills: 0 | Status: SHIPPED | OUTPATIENT
Start: 2022-07-26 | End: 2023-12-06

## 2022-07-26 RX ORDER — CODEINE PHOSPHATE AND GUAIFENESIN 10; 100 MG/5ML; MG/5ML
1-2 SOLUTION ORAL EVERY 4 HOURS PRN
Qty: 236 ML | Refills: 0 | Status: SHIPPED | OUTPATIENT
Start: 2022-07-26

## 2022-07-26 RX ORDER — AZITHROMYCIN 250 MG/1
TABLET, FILM COATED ORAL
Qty: 6 TABLET | Refills: 0 | Status: SHIPPED | OUTPATIENT
Start: 2022-07-26 | End: 2022-07-31

## 2022-07-26 RX ORDER — BENZONATATE 100 MG/1
200 CAPSULE ORAL 3 TIMES DAILY PRN
Qty: 30 CAPSULE | Refills: 0 | Status: SHIPPED | OUTPATIENT
Start: 2022-07-26

## 2022-07-26 RX ADMIN — IPRATROPIUM BROMIDE AND ALBUTEROL SULFATE 3 ML: 2.5; .5 SOLUTION RESPIRATORY (INHALATION) at 13:25

## 2022-07-26 ASSESSMENT — ENCOUNTER SYMPTOMS
PSYCHIATRIC NEGATIVE: 1
COUGH: 1
SORE THROAT: 0
WHEEZING: 1
FEVER: 0
DIZZINESS: 0
VOMITING: 0
HEADACHES: 0
CHEST TIGHTNESS: 1
FATIGUE: 1
DIARRHEA: 1
ARTHRALGIAS: 1
DIFFICULTY URINATING: 0
CARDIOVASCULAR NEGATIVE: 1
NAUSEA: 0
MYALGIAS: 1
APPETITE CHANGE: 1

## 2022-07-26 NOTE — ED TRIAGE NOTES
Pt presents with cough and sick for 2 weeks.  Pt states that he's starting to wheeze.  Pt reports that he has very little production with his cough.

## 2022-07-26 NOTE — DISCHARGE INSTRUCTIONS
Zpak  500mg today, 250mg a day for 4 days  Prednisone 20mg a day for 5 days  Albuterol Inhaler  1 puff 3 times a day for 3-5 days Automatically  Tessalon Perles  1 tab 3 times a day for 3-5 days and PRN    Keep well hydrated.    Take Imodium for diarrhea over the counter.    Discussed Robitussin AC  1 tsp q 4-6 hours as needed for cough. Do not use Lortab with this.

## 2022-07-26 NOTE — ED PROVIDER NOTES
History     Chief Complaint   Patient presents with     Cough     HPI  Jese Joe is a 47 year old male who was seen on 7/17/22 for nasal congestion, fever, fatigue and cough . Had negative Covid test.  Developed hives and was put on  Steroid for 5 days, that has diminished. Has cough, and wheezes- no product.   Hydrocodone stops the cough.  States has diarrhea. No blood. Hx UC and is not on his entocort.  No further fever     Allergies:  Allergies   Allergen Reactions     Cleocin Hives       Problem List:    Patient Active Problem List    Diagnosis Date Noted     Chronic, continuous use of opioids 09/23/2019     Priority: Medium     SBO (small bowel obstruction) (H) 03/26/2019     Priority: Medium     ACP (advance care planning) 07/31/2017     Priority: Medium     Advance Care Planning 7/31/2017: ACP Review of Chart / Resources Provided:  Reviewed chart for advance care plan.  Jese Joe has no plan or code status on file. Discussed available resources and provided with information.   Added by Indira Santoro                  Past Medical History:    Past Medical History:   Diagnosis Date     History of steroid therapy      Stomach problems        Past Surgical History:    Past Surgical History:   Procedure Laterality Date     COLONOSCOPY N/A 6/18/2019    Procedure: COLONOSCOPY, WITH POLYPECTOMY AND BIOPSY;  Surgeon: Urszula Vides MD;  [facility]     COLONOSCOPY N/A 3/9/2020    Procedure: COLONOSCOPY, WITH POLYPECTOMY AND BIOPSY;  Surgeon: Urszula Vides MD;  [facility]     ESOPHAGOSCOPY, GASTROSCOPY, DUODENOSCOPY (EGD), COMBINED N/A 6/18/2019    Procedure: ESOPHAGOGASTRODUODENOSCOPY, WITH BIOPSY;  Surgeon: Urszula Vides MD;  [facility]     GENITOURINARY SURGERY       NO SURGICAL HISTORY[         Family History:    Family History   Problem Relation Age of Onset     Other - See Comments Mother      Diabetes Maternal Grandfather      Heart Disease Paternal Grandfather         MI      Diabetes Other      Colon Cancer No family hx of        Social History:  Marital Status:   [2]  Social History     Tobacco Use     Smoking status: Former Smoker     Packs/day: 0.00     Years: 33.00     Pack years: 0.00     Types: Cigarettes     Start date: 1/1/1987     Smokeless tobacco: Never Used   Substance Use Topics     Alcohol use: Yes     Comment: occasionally     Drug use: No        Medications:    azithromycin (ZITHROMAX Z-DENISE) 250 MG tablet  benzonatate (TESSALON) 100 MG capsule  guaiFENesin-codeine (ROBITUSSIN AC) 100-10 MG/5ML solution  predniSONE (DELTASONE) 20 MG tablet  Acetaminophen (TYLENOL PO)  albuterol (PROAIR HFA/PROVENTIL HFA/VENTOLIN HFA) 108 (90 Base) MCG/ACT inhaler  ALPRAZolam (XANAX) 0.5 MG tablet  budesonide (ENTOCORT EC) 3 MG EC capsule  cetirizine (ZYRTEC) 10 MG tablet  fluticasone (FLONASE) 50 MCG/ACT nasal spray  HYDROcodone-acetaminophen (NORCO) 5-325 MG tablet  ondansetron (ZOFRAN-ODT) 4 MG ODT tab  spacer (OPTICHAMBER COREY) holding chamber          Review of Systems   Constitutional: Positive for appetite change and fatigue. Negative for fever.   HENT: Positive for ear pain. Negative for congestion, postnasal drip and sore throat.    Respiratory: Positive for cough, chest tightness and wheezing.    Cardiovascular: Negative.    Gastrointestinal: Positive for diarrhea. Negative for nausea and vomiting.   Genitourinary: Negative for difficulty urinating.   Musculoskeletal: Positive for arthralgias and myalgias.   Skin: Positive for rash.        Had hives.    Neurological: Negative for dizziness and headaches.   Psychiatric/Behavioral: Negative.        Physical Exam   BP: 161/100  Pulse: 84  Temp: 97.8  F (36.6  C)  Resp: 16  SpO2: 93 %      Physical Exam  Constitutional:       General: He is not in acute distress.  HENT:      Right Ear: Tympanic membrane, ear canal and external ear normal.      Left Ear: Tympanic membrane, ear canal and external ear normal.      Nose: Nose  normal. No congestion.      Mouth/Throat:      Mouth: Mucous membranes are moist.      Pharynx: Oropharynx is clear. No posterior oropharyngeal erythema.   Eyes:      Extraocular Movements: Extraocular movements intact.      Conjunctiva/sclera: Conjunctivae normal.      Pupils: Pupils are equal, round, and reactive to light.   Cardiovascular:      Rate and Rhythm: Normal rate and regular rhythm.      Heart sounds: Normal heart sounds. No murmur heard.  Pulmonary:      Effort: Pulmonary effort is normal.      Breath sounds: Wheezing and rhonchi present.      Comments: Decreased breath sounds posteriorly   Wheezes throughout.    Musculoskeletal:         General: Normal range of motion.      Cervical back: Normal range of motion and neck supple.   Lymphadenopathy:      Cervical: No cervical adenopathy.   Skin:     General: Skin is warm and dry.      Findings: Rash present.      Comments: On inner arms, small red pimply rash.     Neurological:      General: No focal deficit present.      Mental Status: He is alert and oriented to person, place, and time.   Psychiatric:         Mood and Affect: Mood normal.         Behavior: Behavior normal.         ED Course                 Procedures    Duoneb   Lungs with few coarse wheezes Left base after neb.           Results for orders placed or performed during the hospital encounter of 07/26/22 (from the past 24 hour(s))   Chest XR,  PA & LAT    Narrative    Procedure:XR CHEST 2 VIEWS    Clinical history:Male, 47 years, cough X2 weeks  Negative covid,  wheezes.    Technique: Two views are submitted.    Comparison: 8/15/2019    Findings: The cardiac silhouette is normal. The pulmonary vasculature  is normal.    The lungs are clear. Bony structures are unremarkable.      Impression    Impression:   No acute abnormality. No evidence of acute or active cardiopulmonary  disease.    GERDA COTTON MD         SYSTEM ID:  [id number]   CBC with platelets differential    Narrative    The  following orders were created for panel order CBC with platelets differential.  Procedure                               Abnormality         Status                     ---------                               -----------         ------                     CBC with platelets and d...[[id number]]  Abnormal            Final result                 Please view results for these tests on the individual orders.   CBC with platelets and differential   Result Value Ref Range    WBC Count 13.3 (H) 4.0 - 11.0 10e3/uL    RBC Count 4.88 4.40 - 5.90 10e6/uL    Hemoglobin 14.4 13.3 - 17.7 g/dL    Hematocrit 42.7 40.0 - 53.0 %    MCV 88 78 - 100 fL    MCH 29.5 26.5 - 33.0 pg    MCHC 33.7 31.5 - 36.5 g/dL    RDW 12.9 10.0 - 15.0 %    Platelet Count 363 150 - 450 10e3/uL    % Neutrophils 61 %    % Lymphocytes 29 %    % Monocytes 7 %    % Eosinophils 2 %    % Basophils 0 %    % Immature Granulocytes 1 %    NRBCs per 100 WBC 0 <1 /100    Absolute Neutrophils 8.1 1.6 - 8.3 10e3/uL    Absolute Lymphocytes 3.8 0.8 - 5.3 10e3/uL    Absolute Monocytes 1.0 0.0 - 1.3 10e3/uL    Absolute Eosinophils 0.2 0.0 - 0.7 10e3/uL    Absolute Basophils 0.0 0.0 - 0.2 10e3/uL    Absolute Immature Granulocytes 0.1 <=0.4 10e3/uL    Absolute NRBCs 0.0 10e3/uL   Extra Tube    Narrative    The following orders were created for panel order Extra Tube.  Procedure                               Abnormality         Status                     ---------                               -----------         ------                     Extra Blue Top Tube[[id number]]                              In process                 Extra Red Top Tube[[id number]]                               In process                 Extra Green Top (Lithium...[[id number]]                      In process                 Extra Green Top (Lithium...[[id number]]                      In process                   Please view results for these tests on the individual orders.       Medications   ipratropium -  albuterol 0.5 mg/2.5 mg/3 mL (DUONEB) neb solution 3 mL (has no administration in time range)       Assessments & Plan (with Medical Decision Making)     I have reviewed the nursing notes.    I have reviewed the findings, diagnosis, plan and need for follow up with the patient.      New Prescriptions    AZITHROMYCIN (ZITHROMAX Z-DENISE) 250 MG TABLET    Two tablets on the first day, then one tablet daily for the next 4 days    BENZONATATE (TESSALON) 100 MG CAPSULE    Take 2 capsules (200 mg) by mouth 3 times daily as needed for cough    GUAIFENESIN-CODEINE (ROBITUSSIN AC) 100-10 MG/5ML SOLUTION    Take 5-10 mLs by mouth every 4 hours as needed for cough (Do not take Lortab with this)    PREDNISONE (DELTASONE) 20 MG TABLET    Take 20mg a day for 5 days, (Use the last 2 20mg you have at home to equal 5 days)       Final diagnoses:   Acute bronchitis, unspecified organism     ASSESSMENT / PLAN:  (J20.9) Acute bronchitis, unspecified organism  Comment:   Plan:   1. Zpak  500mg today, 250mg a day for 4 days  2. Prednisone 20mg a day for 5 days  3. Albuterol Inhaler  1 puff 3 times a day for 3-5 days Automatically  4. Tessalon Perles  1 tab 3 times a day for 3-5 days and PRN    5. Keep well hydrated.    6. Take Imodium for diarrhea over the counter.   7.    Erlichiosis/anaplasmosis  and babesia drawn per patient request   8.    Robitussin AC 1 tsp  q 4-6 hours for cough. Do not use with Lortab.    7/26/2022   [facility]     Elliot Alexander NP  07/26/22 [id number]         Elliot Alexander NP  07/26/22 [id number]

## 2022-07-26 NOTE — ED TRIAGE NOTES
Pt presents with c/o barely productive cough. More so coughing fits and wheezing. Sx started two weeks ago. Does have seasonal allergies. Pt has been taking Flonase and zyrtec. Pt does not want a covid test at this time unless provider wants one.

## 2022-07-29 LAB
A PHAGOCYTOPH IGG TITR SER IF: NORMAL {TITER}
A PHAGOCYTOPH IGM TITR SER IF: NORMAL {TITER}
B MICROTI IGG TITR SER: NORMAL {TITER}
B MICROTI IGM TITR SER: NORMAL {TITER}

## 2022-09-04 ENCOUNTER — HEALTH MAINTENANCE LETTER (OUTPATIENT)
Age: 47
End: 2022-09-04

## 2022-09-15 DIAGNOSIS — J98.01 BRONCHOSPASM: ICD-10-CM

## 2022-09-15 RX ORDER — FLUTICASONE PROPIONATE 50 MCG
SPRAY, SUSPENSION (ML) NASAL
Qty: 16 G | Refills: 0 | Status: SHIPPED | OUTPATIENT
Start: 2022-09-15 | End: 2023-10-12

## 2022-09-21 ENCOUNTER — LAB REQUISITION (OUTPATIENT)
Dept: LAB | Facility: HOSPITAL | Age: 47
End: 2022-09-21

## 2022-09-21 ENCOUNTER — LAB (OUTPATIENT)
Dept: OCCUPATIONAL MEDICINE | Facility: OTHER | Age: 47
End: 2022-09-21
Attending: FAMILY MEDICINE

## 2022-09-21 LAB — SARS-COV-2 RNA RESP QL NAA+PROBE: POSITIVE

## 2022-09-21 PROCEDURE — U0003 INFECTIOUS AGENT DETECTION BY NUCLEIC ACID (DNA OR RNA); SEVERE ACUTE RESPIRATORY SYNDROME CORONAVIRUS 2 (SARS-COV-2) (CORONAVIRUS DISEASE [COVID-19]), AMPLIFIED PROBE TECHNIQUE, MAKING USE OF HIGH THROUGHPUT TECHNOLOGIES AS DESCRIBED BY CMS-2020-01-R: HCPCS | Performed by: FAMILY MEDICINE

## 2023-03-08 ENCOUNTER — OFFICE VISIT (OUTPATIENT)
Dept: FAMILY MEDICINE | Facility: OTHER | Age: 48
End: 2023-03-08
Attending: FAMILY MEDICINE
Payer: COMMERCIAL

## 2023-03-08 VITALS
BODY MASS INDEX: 29.96 KG/M2 | OXYGEN SATURATION: 97 % | SYSTOLIC BLOOD PRESSURE: 116 MMHG | DIASTOLIC BLOOD PRESSURE: 68 MMHG | HEART RATE: 69 BPM | HEIGHT: 71 IN | WEIGHT: 214 LBS | TEMPERATURE: 97.8 F

## 2023-03-08 DIAGNOSIS — Z13.220 LIPID SCREENING: ICD-10-CM

## 2023-03-08 DIAGNOSIS — F41.9 ANXIETY: ICD-10-CM

## 2023-03-08 DIAGNOSIS — R53.83 OTHER FATIGUE: ICD-10-CM

## 2023-03-08 DIAGNOSIS — Z12.5 PROSTATE CANCER SCREENING: ICD-10-CM

## 2023-03-08 DIAGNOSIS — Z11.59 NEED FOR HEPATITIS C SCREENING TEST: ICD-10-CM

## 2023-03-08 DIAGNOSIS — Z23 NEED FOR VACCINATION: ICD-10-CM

## 2023-03-08 DIAGNOSIS — Z00.00 ROUTINE GENERAL MEDICAL EXAMINATION AT A HEALTH CARE FACILITY: Primary | ICD-10-CM

## 2023-03-08 LAB
BASOPHILS # BLD AUTO: 0 10E3/UL (ref 0–0.2)
BASOPHILS NFR BLD AUTO: 0 %
EOSINOPHIL # BLD AUTO: 0.5 10E3/UL (ref 0–0.7)
EOSINOPHIL NFR BLD AUTO: 5 %
ERYTHROCYTE [DISTWIDTH] IN BLOOD BY AUTOMATED COUNT: 13 % (ref 10–15)
HCT VFR BLD AUTO: 43.1 % (ref 40–53)
HGB BLD-MCNC: 14.6 G/DL (ref 13.3–17.7)
LYMPHOCYTES # BLD AUTO: 2.5 10E3/UL (ref 0.8–5.3)
LYMPHOCYTES NFR BLD AUTO: 28 %
MCH RBC QN AUTO: 29.5 PG (ref 26.5–33)
MCHC RBC AUTO-ENTMCNC: 33.9 G/DL (ref 31.5–36.5)
MCV RBC AUTO: 87 FL (ref 78–100)
MONOCYTES # BLD AUTO: 0.8 10E3/UL (ref 0–1.3)
MONOCYTES NFR BLD AUTO: 9 %
NEUTROPHILS # BLD AUTO: 5 10E3/UL (ref 1.6–8.3)
NEUTROPHILS NFR BLD AUTO: 57 %
PLATELET # BLD AUTO: 285 10E3/UL (ref 150–450)
RBC # BLD AUTO: 4.95 10E6/UL (ref 4.4–5.9)
WBC # BLD AUTO: 8.9 10E3/UL (ref 4–11)

## 2023-03-08 PROCEDURE — 82306 VITAMIN D 25 HYDROXY: CPT | Performed by: FAMILY MEDICINE

## 2023-03-08 PROCEDURE — 80061 LIPID PANEL: CPT | Performed by: FAMILY MEDICINE

## 2023-03-08 PROCEDURE — 84153 ASSAY OF PSA TOTAL: CPT | Performed by: FAMILY MEDICINE

## 2023-03-08 PROCEDURE — 80050 GENERAL HEALTH PANEL: CPT | Performed by: FAMILY MEDICINE

## 2023-03-08 PROCEDURE — 90471 IMMUNIZATION ADMIN: CPT | Performed by: FAMILY MEDICINE

## 2023-03-08 PROCEDURE — 84403 ASSAY OF TOTAL TESTOSTERONE: CPT | Performed by: FAMILY MEDICINE

## 2023-03-08 PROCEDURE — 90750 HZV VACC RECOMBINANT IM: CPT | Performed by: FAMILY MEDICINE

## 2023-03-08 PROCEDURE — 36415 COLL VENOUS BLD VENIPUNCTURE: CPT | Performed by: FAMILY MEDICINE

## 2023-03-08 PROCEDURE — 86803 HEPATITIS C AB TEST: CPT | Performed by: FAMILY MEDICINE

## 2023-03-08 PROCEDURE — 2894A PR VOIDCORRECT: CPT | Performed by: FAMILY MEDICINE

## 2023-03-08 PROCEDURE — 99396 PREV VISIT EST AGE 40-64: CPT | Mod: 25 | Performed by: FAMILY MEDICINE

## 2023-03-08 ASSESSMENT — ANXIETY QUESTIONNAIRES
4. TROUBLE RELAXING: NOT AT ALL
6. BECOMING EASILY ANNOYED OR IRRITABLE: NOT AT ALL
GAD7 TOTAL SCORE: 0
3. WORRYING TOO MUCH ABOUT DIFFERENT THINGS: NOT AT ALL
1. FEELING NERVOUS, ANXIOUS, OR ON EDGE: NOT AT ALL
GAD7 TOTAL SCORE: 0
2. NOT BEING ABLE TO STOP OR CONTROL WORRYING: NOT AT ALL
IF YOU CHECKED OFF ANY PROBLEMS ON THIS QUESTIONNAIRE, HOW DIFFICULT HAVE THESE PROBLEMS MADE IT FOR YOU TO DO YOUR WORK, TAKE CARE OF THINGS AT HOME, OR GET ALONG WITH OTHER PEOPLE: NOT DIFFICULT AT ALL
5. BEING SO RESTLESS THAT IT IS HARD TO SIT STILL: NOT AT ALL
7. FEELING AFRAID AS IF SOMETHING AWFUL MIGHT HAPPEN: NOT AT ALL

## 2023-03-08 ASSESSMENT — PATIENT HEALTH QUESTIONNAIRE - PHQ9: SUM OF ALL RESPONSES TO PHQ QUESTIONS 1-9: 4

## 2023-03-08 ASSESSMENT — PAIN SCALES - GENERAL: PAINLEVEL: MILD PAIN (2)

## 2023-03-08 NOTE — PROGRESS NOTES
SUBJECTIVE:   CC: Jese is an 48 year old who presents for preventative health visit.     Healthy Habits:     Getting at least 3 servings of Calcium per day:  Yes    Bi-annual eye exam:  NO    Dental care twice a year:  Yes    Sleep apnea or symptoms of sleep apnea:  Excessive snoring and Sleep apnea    Diet:  Regular (no restrictions)    Frequency of exercise:  None    Taking medications regularly:  Yes    Medication side effects:  None    PHQ-2 Total Score: 1    Additional concerns today:  No              Today's PHQ-2 Score:   PHQ-2 ( 1999 Pfizer) 3/8/2023   Q1: Little interest or pleasure in doing things 1   Q2: Feeling down, depressed or hopeless 0   PHQ-2 Score 1   PHQ-2 Total Score (12-17 Years)- Positive if 3 or more points; Administer PHQ-A if positive -   Q1: Little interest or pleasure in doing things Several days   Q2: Feeling down, depressed or hopeless Not at all   PHQ-2 Score 1       Have you ever done Advance Care Planning? (For example, a Health Directive, POLST, or a discussion with a medical provider or your loved ones about your wishes): No, advance care planning information given to patient to review.  Patient declined advance care planning discussion at this time.    Social History     Tobacco Use     Smoking status: Former     Packs/day: 0.00     Years: 33.00     Pack years: 0.00     Types: Cigarettes     Start date: 1/1/1987     Smokeless tobacco: Never   Substance Use Topics     Alcohol use: Yes     Comment: occasionally         Alcohol Use 3/8/2023   Prescreen: >3 drinks/day or >7 drinks/week? Yes   AUDIT SCORE  6     AUDIT - Alcohol Use Disorders Identification Test - Reproduced from the World Health Organization Audit 2001 (Second Edition) 3/8/2023   1.  How often do you have a drink containing alcohol? 4 or more times a week   2.  How many drinks containing alcohol do you have on a typical day when you are drinking? 1 or 2   3.  How often do you have five or more drinks on one occasion?  Monthly   4.  How often during the last year have you found that you were not able to stop drinking once you had started? Never   5.  How often during the last year have you failed to do what was normally expected of you because of drinking? Never   6.  How often during the last year have you needed a first drink in the morning to get yourself going after a heavy drinking session? Never   7.  How often during the last year have you had a feeling of guilt or remorse after drinking? Never   8.  How often during the last year have you been unable to remember what happened the night before because of your drinking? Never   9.  Have you or someone else been injured because of your drinking? No   10. Has a relative, friend, doctor or other health care worker been concerned about your drinking or suggested you cut down? No   TOTAL SCORE 6       Last PSA:   PSA   Date Value Ref Range Status   02/12/2020 1.10 0 - 4 ug/L Final     Comment:     Assay Method:  Chemiluminescence using Siemens Vista analyzer       Reviewed orders with patient. Reviewed health maintenance and updated orders accordingly -   Lab work is in process    Reviewed and updated as needed this visit by clinical staff   Tobacco  Allergies  Meds              Reviewed and updated as needed this visit by Provider                 Past Medical History:   Diagnosis Date     History of steroid therapy      Stomach problems       Past Surgical History:   Procedure Laterality Date     COLONOSCOPY N/A 6/18/2019    Procedure: COLONOSCOPY, WITH POLYPECTOMY AND BIOPSY;  Surgeon: Urszula Vides MD;  [facility]     COLONOSCOPY N/A 3/9/2020    Procedure: COLONOSCOPY, WITH POLYPECTOMY AND BIOPSY;  Surgeon: Urszula Vides MD;  [facility]     ESOPHAGOSCOPY, GASTROSCOPY, DUODENOSCOPY (EGD), COMBINED N/A 6/18/2019    Procedure: ESOPHAGOGASTRODUODENOSCOPY, WITH BIOPSY;  Surgeon: Urszula Vides MD;  [facility]     GENITOURINARY SURGERY       NO SURGICAL  "HISTORY[         Review of Systems  CONSTITUTIONAL: NEGATIVE for fever, chills, change in weight  INTEGUMENTARY/SKIN: NEGATIVE for worrisome rashes, moles or lesions  EYES: NEGATIVE for vision changes or irritation  ENT: NEGATIVE for ear, mouth and throat problems  RESP: NEGATIVE for significant cough or SOB  CV: NEGATIVE for chest pain, palpitations or peripheral edema  GI: NEGATIVE for nausea, abdominal pain, heartburn, or change in bowel habits   male: negative for dysuria, hematuria, decreased urinary stream, erectile dysfunction, urethral discharge  MUSCULOSKELETAL: NEGATIVE for significant arthralgias or myalgia  NEURO: NEGATIVE for weakness, dizziness or paresthesias  PSYCHIATRIC: NEGATIVE for changes in mood or affect    OBJECTIVE:   /68   Pulse 69   Temp 97.8  F (36.6  C) (Tympanic)   Ht 1.803 m (5' 11\")   Wt 97.1 kg (214 lb)   SpO2 97%   BMI 29.85 kg/m      Physical Exam  GENERAL: healthy, alert and no distress  EYES: Eyes grossly normal to inspection, PERRL and conjunctivae and sclerae normal  HENT: ear canals and TM's normal, nose and mouth without ulcers or lesions  NECK: no adenopathy, no asymmetry, masses, or scars and thyroid normal to palpation  RESP: lungs clear to auscultation - no rales, rhonchi or wheezes  CV: regular rate and rhythm, normal S1 S2, no S3 or S4, no murmur, click or rub, no peripheral edema and peripheral pulses strong  ABDOMEN: soft, nontender, no hepatosplenomegaly, no masses and bowel sounds normal  MS: no gross musculoskeletal defects noted, no edema  SKIN: no suspicious lesions or rashes  NEURO: Normal strength and tone, mentation intact and speech normal  PSYCH: mentation appears normal, affect normal/bright    Diagnostic Test Results:  Labs reviewed in Epic    ASSESSMENT/PLAN:       ICD-10-CM    1. Routine general medical examination at a health care facility  Z00.00       2. Lipid screening  Z13.220 Comprehensive metabolic panel     Lipid Profile      3. " "Prostate cancer screening  Z12.5 Prostate Specific Antigen Screen      4. Need for hepatitis C screening test  Z11.59 Hepatitis C Screen Reflex to HCV RNA Quant and Genotype      5. Anxiety  F41.9                 COUNSELING:   Reviewed preventive health counseling, as reflected in patient instructions      BMI:   Estimated body mass index is 29.85 kg/m  as calculated from the following:    Height as of this encounter: 1.803 m (5' 11\").    Weight as of this encounter: 97.1 kg (214 lb).         He reports that he has quit smoking. His smoking use included cigarettes. He started smoking about 36 years ago. He has never used smokeless tobacco.            Andrei Philip MD  [facility]  "

## 2023-03-09 LAB
ALBUMIN SERPL BCG-MCNC: 4.4 G/DL (ref 3.5–5.2)
ALP SERPL-CCNC: 70 U/L (ref 40–129)
ALT SERPL W P-5'-P-CCNC: 46 U/L (ref 10–50)
ANION GAP SERPL CALCULATED.3IONS-SCNC: 10 MMOL/L (ref 7–15)
AST SERPL W P-5'-P-CCNC: 29 U/L (ref 10–50)
BILIRUB SERPL-MCNC: 0.3 MG/DL
BUN SERPL-MCNC: 15.4 MG/DL (ref 6–20)
CALCIUM SERPL-MCNC: 9.5 MG/DL (ref 8.6–10)
CHLORIDE SERPL-SCNC: 104 MMOL/L (ref 98–107)
CHOLEST SERPL-MCNC: 206 MG/DL
CREAT SERPL-MCNC: 0.9 MG/DL (ref 0.67–1.17)
DEPRECATED HCO3 PLAS-SCNC: 26 MMOL/L (ref 22–29)
GFR SERPL CREATININE-BSD FRML MDRD: >90 ML/MIN/1.73M2
GLUCOSE SERPL-MCNC: 87 MG/DL (ref 70–99)
HDLC SERPL-MCNC: 45 MG/DL
LDLC SERPL CALC-MCNC: 107 MG/DL
NONHDLC SERPL-MCNC: 161 MG/DL
POTASSIUM SERPL-SCNC: 4.2 MMOL/L (ref 3.4–5.3)
PROT SERPL-MCNC: 7.2 G/DL (ref 6.4–8.3)
PSA SERPL-MCNC: 1.16 NG/ML (ref 0–3.5)
SODIUM SERPL-SCNC: 140 MMOL/L (ref 136–145)
TRIGL SERPL-MCNC: 269 MG/DL
TSH SERPL DL<=0.005 MIU/L-ACNC: 2.67 UIU/ML (ref 0.3–4.2)

## 2023-03-10 LAB
DEPRECATED CALCIDIOL+CALCIFEROL SERPL-MC: 31 UG/L (ref 20–75)
HCV AB SERPL QL IA: NONREACTIVE

## 2023-03-11 LAB — TESTOST SERPL-MCNC: 316 NG/DL (ref 240–950)

## 2023-06-15 ENCOUNTER — OFFICE VISIT (OUTPATIENT)
Dept: CHIROPRACTIC MEDICINE | Facility: OTHER | Age: 48
End: 2023-06-15
Attending: CHIROPRACTOR
Payer: COMMERCIAL

## 2023-06-15 DIAGNOSIS — M99.03 SEGMENTAL AND SOMATIC DYSFUNCTION OF LUMBAR REGION: Primary | ICD-10-CM

## 2023-06-15 DIAGNOSIS — M99.01 SEGMENTAL AND SOMATIC DYSFUNCTION OF CERVICAL REGION: ICD-10-CM

## 2023-06-15 DIAGNOSIS — M99.02 SEGMENTAL AND SOMATIC DYSFUNCTION OF THORACIC REGION: ICD-10-CM

## 2023-06-15 DIAGNOSIS — M54.50 ACUTE BILATERAL LOW BACK PAIN WITHOUT SCIATICA: ICD-10-CM

## 2023-06-15 PROCEDURE — 99202 OFFICE O/P NEW SF 15 MIN: CPT | Mod: 25 | Performed by: CHIROPRACTOR

## 2023-06-15 PROCEDURE — 98941 CHIROPRACT MANJ 3-4 REGIONS: CPT | Mod: AT | Performed by: CHIROPRACTOR

## 2023-06-19 NOTE — PROGRESS NOTES
Subjective Finding:    Chief compalint: Patient presents with:  Back Pain: Back and neck pain on occasion.  Stiffness as well    , Pain Scale: 6/10, Intensity: dull and ache, Duration: 1 weeks, Radiating: no.    Date of injury:     Activities that the pain restricts:   Home/household/hobbies/social activities: yes.  Work duties: no.  Sleep: no.  Makes symptoms better: rest.  Makes symptoms worse: activity and lumbar flexion.  Have you seen anyone else for the symptoms? No.  Work related: no.  Automobile related injury: no.    Objective and Assessment:    Posture Analysis:   High shoulder: .  Head tilt: .  High iliac crest: .  Head carriage: neutral.  Thoracic Kyphosis: neutral.  Lumbar Lordosis: forward.    Lumbar Range of Motion: extension decreased.  Cervical Range of Motion: .  Thoracic Range of Motion: .  Extremity Range of Motion: .    Palpation:   Quad lumb: bilateral, referred pain: no    Segmental dysfunction pre-treatment and treatment area: C1  T6  L45.    Assessment post-treatment:  Cervical: .  Thoracic: ROM increased.  Lumbar: ROM increased.    Comments: .      Complicating Factors: .    Plan / Procedure:      84215    CMT C T L spine as inidcated above        Treatment plan: PRN.  Instructed patient: stretch as instructed at visit.  Short term goals: reduce pain and increase ROM.  Long term goals: restore normal function.  Prognosis: excellent.

## 2023-06-20 ENCOUNTER — OFFICE VISIT (OUTPATIENT)
Dept: CHIROPRACTIC MEDICINE | Facility: OTHER | Age: 48
End: 2023-06-20
Payer: COMMERCIAL

## 2023-06-20 DIAGNOSIS — M99.01 SEGMENTAL AND SOMATIC DYSFUNCTION OF CERVICAL REGION: Primary | ICD-10-CM

## 2023-06-20 DIAGNOSIS — M54.2 CERVICALGIA: ICD-10-CM

## 2023-06-20 DIAGNOSIS — M99.02 SEGMENTAL AND SOMATIC DYSFUNCTION OF THORACIC REGION: ICD-10-CM

## 2023-06-20 DIAGNOSIS — M99.03 SEGMENTAL AND SOMATIC DYSFUNCTION OF LUMBAR REGION: ICD-10-CM

## 2023-06-20 PROCEDURE — 98941 CHIROPRACT MANJ 3-4 REGIONS: CPT | Mod: AT | Performed by: CHIROPRACTOR

## 2023-06-21 NOTE — PROGRESS NOTES
Subjective Finding:    Chief compalint: Patient presents with:  Neck Pain: Pain at the lower neck and upper back region  , Pain Scale: 6/10, Intensity: dull and ache, Duration: 1 weeks, Radiating: no.    Date of injury:     Activities that the pain restricts:   Home/household/hobbies/social activities: yes.  Work duties: no.  Sleep: no.  Makes symptoms better: rest.  Makes symptoms worse: activity and lumbar flexion.  Have you seen anyone else for the symptoms? No.  Work related: no.  Automobile related injury: no.    Objective and Assessment:    Posture Analysis:   High shoulder: .  Head tilt: .  High iliac crest: .  Head carriage: neutral.  Thoracic Kyphosis: neutral.  Lumbar Lordosis: forward.    Lumbar Range of Motion: extension decreased.  Cervical Range of Motion: .  Thoracic Range of Motion: .  Extremity Range of Motion: .    Palpation:   Quad lumb: bilateral, referred pain: no    Segmental dysfunction pre-treatment and treatment area: C1  T6  L45.    Assessment post-treatment:  Cervical: .  Thoracic: ROM increased.  Lumbar: ROM increased.    Comments: .      Complicating Factors: .    Plan / Procedure:      57993    [facility] C T L spine as inidcated above        Treatment plan: PRN.  Instructed patient: stretch as instructed at visit.  Short term goals: reduce pain and increase ROM.  Long term goals: restore normal function.  Prognosis: excellent.

## 2023-07-20 ENCOUNTER — PATIENT OUTREACH (OUTPATIENT)
Dept: GASTROENTEROLOGY | Facility: CLINIC | Age: 48
End: 2023-07-20
Payer: COMMERCIAL

## 2023-07-20 DIAGNOSIS — Z12.11 SPECIAL SCREENING FOR MALIGNANT NEOPLASMS, COLON: Primary | ICD-10-CM

## 2023-07-20 NOTE — PROGRESS NOTES
"Pt overdue for recall-   Ordering/Referring Provider: Andrei Philip  BMI: Estimated body mass index is 29.85 kg/m  as calculated from the following:    Height as of 3/8/23: 1.803 m (5' 11\").    Weight as of 3/8/23: 97.1 kg (214 lb).     Sedation:  Based on patient's medical history patient is appropriate for   moderate sedation. If patient's BMI > 50 do not schedule in ASC.    Location:  Does patient have an LVAD?  No    Does patient have a history of moderate to severe sleep apnea?  No- listed as mild sleep apnea    Does patient have a history of asthma, COPD or any other lung disease?  No    Does patient have a history of cardiac disease?  No    Is patient awaiting a heart or lung transplant?   No    Has patient had a stroke or transient ischemic attack in the last 6 months?   No    Is the patient currently on dialysis?   No    Prep:  Previous prep (last colonoscopy):   N/A    Quality of previous prep:   Good    Is patient currently on dialysis, is ESRD, or CKD stage 4/5?   No (standard prep)    Does patient have a diagnosis of diabetes?  No    Does patient have a diagnosis of cystic fibrosis (CF)?  No    BMI > 40?  No    Final Referral Status:  meets the criteria for placement of colonoscopy screening  referral order.      Referral order placed with the following recommendations:  Sedation: Moderate Sedation  Location Type: No Scheduling Restrictions  Prep: MiraLAX (No Mag Citrate)     Jodie Lindquist RN, BSN  Colorectal Cancer   Division of Gastroenterology at [facility]          "

## 2023-10-10 DIAGNOSIS — J98.01 BRONCHOSPASM: ICD-10-CM

## 2023-10-12 RX ORDER — FLUTICASONE PROPIONATE 50 MCG
SPRAY, SUSPENSION (ML) NASAL
Qty: 16 G | Refills: 3 | Status: SHIPPED | OUTPATIENT
Start: 2023-10-12

## 2023-10-12 NOTE — TELEPHONE ENCOUNTER
Flonase      Last Written Prescription Date:  9/15/22  Last Fill Quantity: 16g,   # refills: 0  Last Office Visit: 3/8/23  Future Office visit:       Routing refill request to provider for review/approval because:

## 2023-11-27 ENCOUNTER — HOSPITAL ENCOUNTER (OUTPATIENT)
Facility: CLINIC | Age: 48
End: 2023-11-27
Attending: INTERNAL MEDICINE | Admitting: INTERNAL MEDICINE
Payer: COMMERCIAL

## 2023-11-27 ENCOUNTER — TELEPHONE (OUTPATIENT)
Dept: GASTROENTEROLOGY | Facility: CLINIC | Age: 48
End: 2023-11-27
Payer: COMMERCIAL

## 2023-11-27 NOTE — TELEPHONE ENCOUNTER
"Endoscopy Scheduling Screen    Have you had a positive Covid test in the last 14 days?  No    Are you active on MyChart?   Yes    What insurance is in the chart?  Other:  [facility]    Ordering/Referring Provider: aiyana burnham   (If ordering provider performs procedure, schedule with ordering provider unless otherwise instructed. )    BMI: Estimated body mass index is 29.85 kg/m  as calculated from the following:    Height as of 3/8/23: 1.803 m (5' 11\").    Weight as of 3/8/23: 97.1 kg (214 lb).     Sedation Ordered  moderate sedation.   If patient BMI > 50 do not schedule in ASC.    If patient BMI > 45 do not schedule at [facility].    Are you taking methadone or Suboxone?  No    Are you taking any prescription medications for pain 3 or more times per week?   No    Do you have a history of malignant hyperthermia or adverse reaction to anesthesia?  No    (Females) Are you currently pregnant?   No     Have you been diagnosed or told you have pulmonary hypertension?   No    Do you have an LVAD?  No    Have you been told you have moderate to severe sleep apnea?  Yes (RN Review required for scheduling unless scheduling in Hospital.)    Have you been told you have COPD, asthma, or any other lung disease?  No    Do you have any heart conditions?  No     Have you ever had an organ transplant?   No    Have you ever had or are you awaiting a heart or lung transplant?   No    Have you had a stroke or transient ischemic attack (TIA aka \"mini stroke\" in the last 6 months?   No    Have you been diagnosed with or been told you have cirrhosis of the liver?   No    Are you currently on dialysis?   No    Do you need assistance transferring?   No    BMI: Estimated body mass index is 29.85 kg/m  as calculated from the following:    Height as of 3/8/23: 1.803 m (5' 11\").    Weight as of 3/8/23: 97.1 kg (214 lb).     Is patients BMI > 40 and scheduling location [facility]?  No    Do you take an injectable medication for weight loss or diabetes " (excluding insulin)?  No    Do you take the medication Naltrexone?  No    Do you take blood thinners?  No       Prep   Are you currently on dialysis or do you have chronic kidney disease?  No    Do you have a diagnosis of diabetes?  No    Do you have a diagnosis of cystic fibrosis (CF)?  No    On a regular basis do you go 3 -5 days between bowel movements?  No    BMI > 40?  No    Preferred Pharmacy:    [facility] [place] - [address]  [address] [place] [address]  Phone: [phone] Fax: [phone]      Final Scheduling Details   Colonoscopy prep sent?  Standard MiraLAX    Procedure scheduled  Colonoscopy    Surgeon:  OMAR     Date of procedure:  12/6/23     Pre-OP / PAC:   No - Not required for this site.    Location  [facility] - Per exclusion criteria.    Sedation   Moderate Sedation - Per order.      Patient Reminders:   You will receive a call from a Nurse to review instructions and health history.  This assessment must be completed prior to your procedure.  Failure to complete the Nurse assessment may result in the procedure being cancelled.      On the day of your procedure, please designate an adult(s) who can drive you home stay with you for the next 24 hours. The medicines used in the exam will make you sleepy. You will not be able to drive.      You cannot take public transportation, ride share services, or non-medical taxi service without a responsible caregiver.  Medical transport services are allowed with the requirement that a responsible caregiver will receive you at your destination.  We require that drivers and caregivers are confirmed prior to your procedure.

## 2023-11-28 ENCOUNTER — TELEPHONE (OUTPATIENT)
Dept: GASTROENTEROLOGY | Facility: CLINIC | Age: 48
End: 2023-11-28
Payer: COMMERCIAL

## 2023-11-28 NOTE — TELEPHONE ENCOUNTER
Pt returned call stating he would like to have the procedure done at a location closer to his home and will not be rescheduling at this time, but will call if anything changes.

## 2023-11-28 NOTE — TELEPHONE ENCOUNTER
Caller: Writer to pt  Reason for Reschedule/Cancellation (please be detailed, any staff messages or encounters to note?): Per RN review, MAC needed      Prior to reschedule please review:  Ordering Provider: Andrei Philip  Sedation per order: Moderate  Does patient have any ASC Exclusions, please identify?: ILAN      Notes on Cancelled Procedure:  Procedure: Lower Endoscopy [Colonoscopy]   Date: 12/6/23  Location: [facility]; [address], [place] [address]  Surgeon: Amado      Rescheduled: No       Send In - basket message to Panc - Yonathan Pool if [facility]  procedure is canceled or rescheduled: [ N/A, YES or NO] N/A    LM and sent [employer]. Hold on 1/30 at [facility] with

## 2023-12-06 ENCOUNTER — OFFICE VISIT (OUTPATIENT)
Dept: SURGERY | Facility: OTHER | Age: 48
End: 2023-12-06
Attending: SURGERY
Payer: COMMERCIAL

## 2023-12-06 VITALS
SYSTOLIC BLOOD PRESSURE: 126 MMHG | HEIGHT: 72 IN | HEART RATE: 69 BPM | TEMPERATURE: 96.9 F | BODY MASS INDEX: 27.77 KG/M2 | WEIGHT: 205 LBS | DIASTOLIC BLOOD PRESSURE: 70 MMHG | OXYGEN SATURATION: 98 %

## 2023-12-06 DIAGNOSIS — Z12.11 SPECIAL SCREENING FOR MALIGNANT NEOPLASMS, COLON: Primary | ICD-10-CM

## 2023-12-06 PROCEDURE — 99203 OFFICE O/P NEW LOW 30 MIN: CPT | Performed by: SURGERY

## 2023-12-06 ASSESSMENT — PAIN SCALES - GENERAL: PAINLEVEL: NO PAIN (0)

## 2023-12-07 NOTE — PROGRESS NOTES
Surgery Consult Clinic Note      RE: Jese Joe  : 1975  RONAL: 2023      Chief Complaint:  Colon Cancer Screening     History of Present Illness:  Jese Joe is a very pleasant 48 year old year old male who I am seeing at the request of Dr. Philip for evaluation of screening colon malignant neoplasm and consideration for colonoscopy.  He has history of bowel obstruction thought to be due to an inflammatory process in his terminal ileum. He has had two prior colonoscopies but has never been diagnosed with inflammatory bowel disease. He is not on treatment. He denies symptoms, he is under less stress and has quit smoking. He works as an RN in our ED. He specifically denies fever, chills, nausea, vomiting, chest pain, shortness of breath or palpitations.      Medical history:  Past Medical History:   Diagnosis Date    History of steroid therapy     Stomach problems        Surgical history:  Past Surgical History:   Procedure Laterality Date    COLONOSCOPY N/A 2019    Procedure: COLONOSCOPY, WITH POLYPECTOMY AND BIOPSY;  Surgeon: Urszula Vides MD;  Location: [facility]    COLONOSCOPY N/A 3/9/2020    Procedure: COLONOSCOPY, WITH POLYPECTOMY AND BIOPSY;  Surgeon: Urszula Vides MD;  Location: [facility]    ESOPHAGOSCOPY, GASTROSCOPY, DUODENOSCOPY (EGD), COMBINED N/A 2019    Procedure: ESOPHAGOGASTRODUODENOSCOPY, WITH BIOPSY;  Surgeon: Urszula Vides MD;  Location: [facility]    GENITOURINARY SURGERY      NO SURGICAL HISTORY[         Family history:  Family History   Problem Relation Age of Onset    Other - See Comments Mother     Diabetes Maternal Grandfather     Heart Disease Paternal Grandfather         MI    Diabetes Other     Colon Cancer No family hx of        Medications:  Current Outpatient Medications   Medication Sig Dispense Refill    Acetaminophen (TYLENOL PO) Take 1,000 mg by mouth every 6 hours as needed       albuterol (PROAIR HFA/PROVENTIL HFA/VENTOLIN HFA) 108 (90 Base) MCG/ACT  inhaler Inhale 2 puffs into the lungs every 6 hours as needed for shortness of breath / dyspnea or wheezing 18 g 1    ALPRAZolam (XANAX) 0.5 MG tablet TAKE 1 TABLET BY MOUTH 2 TIMES A DAY AS NEEDED FOR ANXIETY 20 tablet 0    benzonatate (TESSALON) 100 MG capsule Take 2 capsules (200 mg) by mouth 3 times daily as needed for cough 30 capsule 0    budesonide (ENTOCORT EC) 3 MG EC capsule Take 2 capsules (6 mg) by mouth every morning 60 capsule 1    cetirizine (ZYRTEC) 10 MG tablet Take 10 mg by mouth daily      fluticasone (FLONASE) 50 MCG/ACT nasal spray SPRAY 1 TO 2 SPRAYS INTO BOTH NOSTRILS DAILY 16 g 3    guaiFENesin-codeine (ROBITUSSIN AC) 100-10 MG/5ML solution Take 5-10 mLs by mouth every 4 hours as needed for cough (Do not take Lortab with this) 236 mL 0    HYDROcodone-acetaminophen (NORCO) 5-325 MG tablet TAKE 1 TABLET BY MOUTH 2 TIMES A DAY AS NEEDED 40 tablet 0    ondansetron (ZOFRAN-ODT) 4 MG ODT tab DISSOLVE 1 TABLET IN MOUTH EVERY 6 HOURS AS NEEDED FOR NAUSEA 20 tablet 0    spacer (OPTICHAMBER COREY) holding chamber Use with inhaler 1 each 0     Allergies:  The patientis allergic to clindamycin phosphate.  .  Social history:  Social History     Tobacco Use    Smoking status: Former     Packs/day: 0.00     Years: 33.00     Additional pack years: 0.00     Total pack years: 0.00     Types: Cigarettes     Start date: 1/1/1987    Smokeless tobacco: Never   Substance Use Topics    Alcohol use: Yes     Comment: occasionally     Marital status: .  Occupation: RN.    Review of Systems:  10 point review of systems was obtained and negative other than what previously mentioned in HPI    Physical Examination:  /70 (BP Location: Left arm, Cuff Size: Adult Large)   Pulse 69   Temp 96.9  F (36.1  C) (Tympanic)   Ht 1.829 m (6')   Wt 93 kg (205 lb)   SpO2 98%   BMI 27.80 kg/m    General: AAOx4, NAD, WN/WD, ambulating without assistance  HEENT:NCAT, EOMI, PERRL Sclerae anicteric; Trachea mideline,    Chest:  No acute distress, CTAB   Cardiac:  regular rate and rhythm, no murmur   Abdomen: non-distended   Extremities: Cursory exam unremarkable.  Skin: Warm, dry,   Neuro: no focal deficit,   Psych: happy, calm, asks appropriate questions      Assessment/Plan:  48 y.o. male with history of inflammation of terminal ileum resulting in obstruction in the past. He is due for repeat surveillance of his colon and would like to have it here instead of seeing his GI.    Satisfactory candidate for colonoscopy.  The indications, risks, benefits and technical aspects of whole colon colonoscopy were outlined with risks including, but not limited to, perforation, bleeding and inability to visualize entire colon.  Management of each was reviewed.  The need of mechanical preparation of the colon was reviewed along with the use of monitored anesthetic care.  The patient's questions were asked and answered.      Manuelito Shahid MD

## 2024-01-09 ENCOUNTER — PREP FOR PROCEDURE (OUTPATIENT)
Dept: SURGERY | Facility: OTHER | Age: 49
End: 2024-01-09

## 2024-01-09 DIAGNOSIS — Z87.19 HISTORY OF INFLAMMATORY BOWEL DISEASE: Primary | ICD-10-CM

## 2024-01-29 NOTE — OR NURSING
Has not use inhaler in over a year and believes it to be . No Medications to take day of procedure.

## 2024-01-30 ENCOUNTER — ANESTHESIA EVENT (OUTPATIENT)
Dept: SURGERY | Facility: HOSPITAL | Age: 49
End: 2024-01-30
Payer: COMMERCIAL

## 2024-01-30 NOTE — ANESTHESIA PREPROCEDURE EVALUATION
Anesthesia Pre-Procedure Evaluation    Patient: Jese Joe   MRN: [id number] : 1975        Procedure : Procedure(s):  COLONOSCOPY with possible biopsy, posible polypectomy          Past Medical History:   Diagnosis Date     History of steroid therapy      Stomach problems       Past Surgical History:   Procedure Laterality Date     COLONOSCOPY N/A 2019    Procedure: COLONOSCOPY, WITH POLYPECTOMY AND BIOPSY;  Surgeon: Urszula Vides MD;  [facility]     COLONOSCOPY N/A 3/9/2020    Procedure: COLONOSCOPY, WITH POLYPECTOMY AND BIOPSY;  Surgeon: Urszula Vides MD;  [facility]     ESOPHAGOSCOPY, GASTROSCOPY, DUODENOSCOPY (EGD), COMBINED N/A 2019    Procedure: ESOPHAGOGASTRODUODENOSCOPY, WITH BIOPSY;  Surgeon: Urszula Vides MD;  [facility]     GENITOURINARY SURGERY       NO SURGICAL HISTORY[        Allergies   Allergen Reactions     Clindamycin Phosphate Hives      Social History     Tobacco Use     Smoking status: Former     Packs/day: 0.00     Years: 33.00     Additional pack years: 0.00     Total pack years: 0.00     Types: Cigarettes     Start date: 1987     Smokeless tobacco: Never   Substance Use Topics     Alcohol use: Yes     Comment: occasionally      Wt Readings from Last 1 Encounters:   23 93 kg (205 lb)        Anesthesia Evaluation   Pt has had prior anesthetic. Type: MAC.        ROS/MED HX  ENT/Pulmonary:     (+) sleep apnea, doesn't use CPAP,   ILAN risk factors, snores loudly,   daytime somnolence, observed stopped breathing,      tobacco use (quit 3 years ago), Past use,                       Neurologic:  - neg neurologic ROS     Cardiovascular:     (+)  - -   -  - -                                 Previous cardiac testing   Echo: Date: Results:    Stress Test:  Date: Results:    ECG Reviewed:  Date:  Results:  SB with RBBB  Cath:  Date: Results:      METS/Exercise Tolerance: >4 METS    Hematologic:  - neg hematologic  ROS     Musculoskeletal:  "Comment: Previous shoulder injection      GI/Hepatic: Comment: H/o SBO    (+)       Inflammatory bowel disease, bowel prep,            Renal/Genitourinary:  - neg Renal ROS     Endo:  - neg endo ROS     Psychiatric/Substance Use: Comment: Etoh 3-4 days a week 2-3 at a sitting    (+)    H/O chronic opiod use .     Infectious Disease:  - neg infectious disease ROS     Malignancy:  - neg malignancy ROS     Other:      (+)  , H/O Chronic Pain,         Physical Exam    Airway      Comment: Facial hair     Mallampati: II   TM distance: > 3 FB   Neck ROM: full   Mouth opening: > 3 cm    Respiratory Devices and Support         Dental       (+) Minor Abnormalities - some fillings, tiny chips      Cardiovascular          Rhythm and rate: regular and normal     Pulmonary           breath sounds clear to auscultation       OUTSIDE LABS:  CBC:   Lab Results   Component Value Date    WBC 8.9 03/08/2023    WBC 13.3 (H) 07/26/2022    HGB 14.6 03/08/2023    HGB 14.4 07/26/2022    HCT 43.1 03/08/2023    HCT 42.7 07/26/2022     03/08/2023     07/26/2022     BMP:   Lab Results   Component Value Date     03/08/2023     05/15/2019    POTASSIUM 4.2 03/08/2023    POTASSIUM 4.0 05/15/2019    CHLORIDE 104 03/08/2023    CHLORIDE 104 05/15/2019    CO2 26 03/08/2023    CO2 27 05/15/2019    BUN 15.4 03/08/2023    BUN 16 05/15/2019    CR 0.90 03/08/2023    CR 0.88 05/15/2019    GLC 87 03/08/2023    GLC 83 05/15/2019     COAGS: No results found for: \"PTT\", \"INR\", \"FIBR\"  POC: No results found for: \"BGM\", \"HCG\", \"HCGS\"  HEPATIC:   Lab Results   Component Value Date    ALBUMIN 4.4 03/08/2023    PROTTOTAL 7.2 03/08/2023    ALT 46 03/08/2023    AST 29 03/08/2023    ALKPHOS 70 03/08/2023    BILITOTAL 0.3 03/08/2023     OTHER:   Lab Results   Component Value Date    LACT 0.6 (L) 03/27/2019    GEOFFREY 9.5 03/08/2023    LIPASE 105 03/26/2019    AMYLASE 32 03/26/2019    TSH 2.67 03/08/2023    CRP <2.9 05/15/2019    SED 5 05/15/2019 "       Anesthesia Plan    ASA Status:  2    NPO Status:  NPO Appropriate (0330 finished prep; food last 0830 yesterday am)    Anesthesia Type: MAC.     - Reason for MAC: straight local not clinically adequate   Induction: Intravenous, Propofol.   Maintenance: Balanced.        Consents    Anesthesia Plan(s) and associated risks, benefits, and realistic alternatives discussed. Questions answered and patient/representative(s) expressed understanding.     - Discussed:     - Discussed with:  Patient      - Extended Intubation/Ventilatory Support Discussed: No.      - Patient is DNR/DNI Status: No     Use of blood products discussed: No .     Postoperative Care            Comments:    Other Comments: Risks and benefits of MAC anesthetic discussed including dental damage, aspiration, loss of airway, conversion to general anesthetic, CV complications, MI, stroke, death. Pt wishes to proceed.               LACHELLE Ceballos CRNA    I have reviewed the pertinent notes and labs in the chart from the past 30 days and (re)examined the patient.  Any updates or changes from those notes are reflected in this note.

## 2024-02-06 ENCOUNTER — ANESTHESIA (OUTPATIENT)
Dept: SURGERY | Facility: HOSPITAL | Age: 49
End: 2024-02-06
Payer: COMMERCIAL

## 2024-02-06 ENCOUNTER — HOSPITAL ENCOUNTER (OUTPATIENT)
Facility: HOSPITAL | Age: 49
Discharge: HOME | End: 2024-02-06
Attending: SURGERY | Admitting: SURGERY
Payer: COMMERCIAL

## 2024-02-06 VITALS
WEIGHT: 212 LBS | SYSTOLIC BLOOD PRESSURE: 134 MMHG | HEART RATE: 81 BPM | OXYGEN SATURATION: 97 % | BODY MASS INDEX: 28.71 KG/M2 | DIASTOLIC BLOOD PRESSURE: 89 MMHG | RESPIRATION RATE: 16 BRPM | HEIGHT: 72 IN | TEMPERATURE: 97.5 F

## 2024-02-06 PROCEDURE — 710N000012 HC RECOVERY PHASE 2, PER MINUTE: Performed by: SURGERY

## 2024-02-06 PROCEDURE — 999N000141 HC STATISTIC PRE-PROCEDURE NURSING ASSESSMENT: Performed by: SURGERY

## 2024-02-06 PROCEDURE — 88305 TISSUE EXAM BY PATHOLOGIST: CPT | Mod: TC | Performed by: SURGERY

## 2024-02-06 PROCEDURE — 88305 TISSUE EXAM BY PATHOLOGIST: CPT | Mod: 26 | Performed by: PATHOLOGY

## 2024-02-06 PROCEDURE — 45380 COLONOSCOPY AND BIOPSY: CPT | Performed by: SURGERY

## 2024-02-06 PROCEDURE — 45380 COLONOSCOPY AND BIOPSY: CPT | Performed by: NURSE ANESTHETIST, CERTIFIED REGISTERED

## 2024-02-06 PROCEDURE — 272N000001 HC OR GENERAL SUPPLY STERILE: Performed by: SURGERY

## 2024-02-06 PROCEDURE — 258N000003 HC RX IP 258 OP 636: Performed by: NURSE ANESTHETIST, CERTIFIED REGISTERED

## 2024-02-06 PROCEDURE — 250N000011 HC RX IP 250 OP 636: Performed by: NURSE ANESTHETIST, CERTIFIED REGISTERED

## 2024-02-06 PROCEDURE — 250N000009 HC RX 250: Performed by: NURSE ANESTHETIST, CERTIFIED REGISTERED

## 2024-02-06 PROCEDURE — 360N000075 HC SURGERY LEVEL 2, PER MIN: Performed by: SURGERY

## 2024-02-06 PROCEDURE — 370N000017 HC ANESTHESIA TECHNICAL FEE, PER MIN: Performed by: SURGERY

## 2024-02-06 RX ORDER — ONDANSETRON 4 MG/1
4 TABLET, ORALLY DISINTEGRATING ORAL EVERY 30 MIN PRN
Status: DISCONTINUED | OUTPATIENT
Start: 2024-02-06 | End: 2024-02-06 | Stop reason: HOSPADM

## 2024-02-06 RX ORDER — LIDOCAINE 40 MG/G
CREAM TOPICAL
Status: DISCONTINUED | OUTPATIENT
Start: 2024-02-06 | End: 2024-02-06 | Stop reason: HOSPADM

## 2024-02-06 RX ORDER — OXYCODONE HYDROCHLORIDE 5 MG/1
10 TABLET ORAL
Status: DISCONTINUED | OUTPATIENT
Start: 2024-02-06 | End: 2024-02-06 | Stop reason: HOSPADM

## 2024-02-06 RX ORDER — ONDANSETRON 2 MG/ML
4 INJECTION INTRAMUSCULAR; INTRAVENOUS EVERY 30 MIN PRN
Status: DISCONTINUED | OUTPATIENT
Start: 2024-02-06 | End: 2024-02-06 | Stop reason: HOSPADM

## 2024-02-06 RX ORDER — SODIUM CHLORIDE, SODIUM LACTATE, POTASSIUM CHLORIDE, CALCIUM CHLORIDE 600; 310; 30; 20 MG/100ML; MG/100ML; MG/100ML; MG/100ML
INJECTION, SOLUTION INTRAVENOUS CONTINUOUS
Status: DISCONTINUED | OUTPATIENT
Start: 2024-02-06 | End: 2024-02-06 | Stop reason: HOSPADM

## 2024-02-06 RX ORDER — PROPOFOL 10 MG/ML
INJECTION, EMULSION INTRAVENOUS PRN
Status: DISCONTINUED | OUTPATIENT
Start: 2024-02-06 | End: 2024-02-06

## 2024-02-06 RX ORDER — LIDOCAINE HYDROCHLORIDE 20 MG/ML
INJECTION, SOLUTION INFILTRATION; PERINEURAL PRN
Status: DISCONTINUED | OUTPATIENT
Start: 2024-02-06 | End: 2024-02-06

## 2024-02-06 RX ORDER — OXYCODONE HYDROCHLORIDE 5 MG/1
5 TABLET ORAL
Status: DISCONTINUED | OUTPATIENT
Start: 2024-02-06 | End: 2024-02-06 | Stop reason: HOSPADM

## 2024-02-06 RX ORDER — PROPOFOL 10 MG/ML
INJECTION, EMULSION INTRAVENOUS CONTINUOUS PRN
Status: DISCONTINUED | OUTPATIENT
Start: 2024-02-06 | End: 2024-02-06

## 2024-02-06 RX ADMIN — PROPOFOL 150 MCG/KG/MIN: 10 INJECTION, EMULSION INTRAVENOUS at 13:07

## 2024-02-06 RX ADMIN — LIDOCAINE HYDROCHLORIDE 80 MG: 20 INJECTION, SOLUTION INFILTRATION; PERINEURAL at 13:06

## 2024-02-06 RX ADMIN — PROPOFOL 50 MG: 10 INJECTION, EMULSION INTRAVENOUS at 13:07

## 2024-02-06 RX ADMIN — SODIUM CHLORIDE, POTASSIUM CHLORIDE, SODIUM LACTATE AND CALCIUM CHLORIDE: 600; 310; 30; 20 INJECTION, SOLUTION INTRAVENOUS at 10:50

## 2024-02-06 RX ADMIN — PROPOFOL 20 MG: 10 INJECTION, EMULSION INTRAVENOUS at 13:10

## 2024-02-06 RX ADMIN — MIDAZOLAM 2 MG: 1 INJECTION INTRAMUSCULAR; INTRAVENOUS at 13:03

## 2024-02-06 ASSESSMENT — ACTIVITIES OF DAILY LIVING (ADL)
ADLS_ACUITY_SCORE: 35
ADLS_ACUITY_SCORE: 35

## 2024-02-06 ASSESSMENT — LIFESTYLE VARIABLES: TOBACCO_USE: 1

## 2024-02-06 NOTE — OP NOTE
Jese Joe MRN# [id number]   YOB: 1975 Age: 49 year old      Date of Admission:  2/6/2024  Date of Service:   2/06/24    Primary care provider: Andrei Philip    PREOPERATIVE DIAGNOSIS:  History of crohn's       POSTOPERATIVE DIAGNOSIS:  No significant inflammation encountered small rectal polyp         PROCEDURE:  Colonoscopy with biopsy and polypectomy           INDICATIONS:  Screening colonoscopy.      Specimen:   ID Type Source Tests Collected by Time Destination   1 :  Biopsy Small Intestine, Terminal Ileum SURGICAL PATHOLOGY EXAM Manuelito Shahid MD 2/6/2024  1:20 PM    2 : Random Colon Biopsies Biopsy Other SURGICAL PATHOLOGY EXAM Manuelito Shahid MD 2/6/2024  1:22 PM    3 :  Polyp Rectum SURGICAL PATHOLOGY EXAM Manuelito Shahid MD 2/6/2024  1:28 PM        SURGEON: Manuelito Shahid MD    DESCRIPTION OF PROCEDURE: Jese Joe was brought into the endoscopy suite and placed in the left lateral decubitus position. After preprocedural pause and attended monitored anesthesia was administered, the external anus was inspected and was normal. Digital rectal exam was normal. The colonoscope was inserted and advanced under direct visualization to the level of the cecum which was identified by the appendiceal orifice and the ileocecal valve. The prep was excellent. The IC valve intubated and the mucosa showed possible occasional aphthous ulceration. Biopsies were taken. The colon was normal appearing grossly, random biopsies taken.   Upon slow withdrawal of the colonoscope, approximately 95% of the mucosa was directly visualized. There was a small polyp in the rectum that was removed with biopsy forceps.  The rest of the colon was without mucosal abnormality. There was no evidence of further polyps, inflammation, bleeding or AVMs. Retroflexion of the rectum was normal. The extra air was removed from the colon, and the colonoscope withdrawn. The patient tolerated the procedure well and was  taken to postanesthesia care unit.     We invite the patient to return in 2-5 years for follow up screening evaluation, pending pathology related to biopsies and polyps.    Manuelito Shahid MD

## 2024-02-06 NOTE — ANESTHESIA CARE TRANSFER NOTE
Patient: Jese Joe    Procedure: Procedure(s):  COLONOSCOPY with biopsies and polypectomy       Diagnosis: History of inflammatory bowel disease [Z87.19]  Diagnosis Additional Information: No value filed.    Anesthesia Type:   MAC     Note:    Oropharynx: oropharynx clear of all foreign objects and spontaneously breathing  Level of Consciousness: awake  Oxygen Supplementation: room air    Independent Airway: airway patency satisfactory and stable  Dentition: dentition unchanged  Vital Signs Stable: post-procedure vital signs reviewed and stable  Report to RN Given: handoff report given  Patient transferred to: Phase II    Handoff Report: Identifed the Patient, Identified the Reponsible Provider, Reviewed the pertinent medical history, Discussed the surgical course, Reviewed Intra-OP anesthesia mangement and issues during anesthesia, Set expectations for post-procedure period and Allowed opportunity for questions and acknowledgement of understanding  Vitals:  Vitals Value Taken Time   /78 02/06/24 1334   Temp 97.5  F (36.4  C) 02/06/24 1334   Pulse 73 02/06/24 1334   Resp 16 02/06/24 1334   SpO2 97 % 02/06/24 1342   Vitals shown include unfiled device data.    Electronically Signed By: LACHELLE Romano CRNA  February 6, 2024  1:43 PM

## 2024-02-06 NOTE — OR NURSING
Patient and responsible adult given discharge instructions with no questions regarding instructions. Denice score 19/20. Pain level 0/10.  Discharged from unit via ambulation. Patient discharged to home with significant other Rae.  Patient did have a couple of small stools reports pink colored.   Denies dizziness/lightheadedness.

## 2024-02-06 NOTE — ANESTHESIA POSTPROCEDURE EVALUATION
Patient: Jese Joe    Procedure: Procedure(s):  COLONOSCOPY with biopsies and polypectomy       Anesthesia Type:  MAC    Note:  Disposition: Outpatient   Postop Pain Control: Uneventful            Sign Out: Well controlled pain   PONV: No   Neuro/Psych: Uneventful            Sign Out: Acceptable/Baseline neuro status   Airway/Respiratory: Uneventful            Sign Out: Acceptable/Baseline resp. status   CV/Hemodynamics: Uneventful            Sign Out: Acceptable CV status; No obvious hypovolemia; No obvious fluid overload   Other NRE: NONE   DID A NON-ROUTINE EVENT OCCUR? No           Last vitals:  Vitals Value Taken Time   /89 02/06/24 1346   Temp 97.5  F (36.4  C) 02/06/24 1334   Pulse 81 02/06/24 1346   Resp 16 02/06/24 1346   SpO2 96 % 02/06/24 1347   Vitals shown include unfiled device data.    Electronically Signed By: LACHELLE MONTIEL CRNA  February 6, 2024  2:15 PM

## 2024-02-06 NOTE — DISCHARGE INSTRUCTIONS
After Anesthesia (Sleep Medicine)  What should I do after anesthesia?  You should rest and relax for the next 24 hours. Avoid risky or difficult (strenuous) activity. A responsible adult should stay with you overnight.  Don't drive or use any heavy equipment for 24 hours. Even if you feel normal, your reactions may be affected by the sleep medicine given to you.  Don't drink alcohol or make any important decisions for 24 hours.  Slowly get back to your regular diet, as you feel able.  How should I expect to feel?  It's normal to feel dizzy, light-headed, or faint for up to a full day after anesthesia or while taking pain medicine. If this happens:   Sit down for a few minutes before standing.  Have someone help you when you get up to walk or use the bathroom.  If you have nausea (feel sick to your stomach) or vomit (throw up):   Drink clear liquids (such as apple juice, ginger ale, broth, or 7UP) until you feel better.  If you feel sick to your stomach, or you keep vomiting for 24 hours, please call the doctor.  What else should I know?  You might have a dry mouth, sore throat, muscle aches, or trouble sleeping. These should go away after 24 hours.  Please contact your doctor if you have any other symptoms that concern you, such as fever, pain, bleeding, fluid drainage, swelling, or headache, or if it's been over 8 to 10 hours and you still aren't able to pee (urinate).  If you have a history of sleep apnea, it's very important to use your CPAP machine for the next 24 hours when you nap or sleep.   For informational purposes only. Not to replace the advice of your health care provider. Copyright   2023 [employer]. All rights reserved. Clinically reviewed by Jose Rodríguez MD. [email or web address] [id number] - REV 09/23.

## 2024-02-06 NOTE — H&P
Surgery Consult Clinic Note        RE:      Jese Joe  :   1975          Chief Complaint:  Colon Cancer Screening      History of Present Illness:  Jese Joe is a very pleasant 48 year old year old male who I am seeing at the request of Dr. Philip for evaluation of screening colon malignant neoplasm and consideration for colonoscopy.  He has history of bowel obstruction thought to be due to an inflammatory process in his terminal ileum. He has had two prior colonoscopies but has never been diagnosed with inflammatory bowel disease. He is not on treatment. He denies symptoms, he is under less stress and has quit smoking. He works as an RN in our ED. He specifically denies fever, chills, nausea, vomiting, chest pain, shortness of breath or palpitations.        Medical history:  Past Medical History        Past Medical History:   Diagnosis Date    History of steroid therapy      Stomach problems              Surgical history:  Past Surgical History         Past Surgical History:   Procedure Laterality Date    COLONOSCOPY N/A 2019     Procedure: COLONOSCOPY, WITH POLYPECTOMY AND BIOPSY;  Surgeon: Urszula Vides MD;  Location: [facility]    COLONOSCOPY N/A 3/9/2020     Procedure: COLONOSCOPY, WITH POLYPECTOMY AND BIOPSY;  Surgeon: Urszula Vides MD;  Location: [facility]    ESOPHAGOSCOPY, GASTROSCOPY, DUODENOSCOPY (EGD), COMBINED N/A 2019     Procedure: ESOPHAGOGASTRODUODENOSCOPY, WITH BIOPSY;  Surgeon: Urszula Vides MD;  Location: [facility] OR    GENITOURINARY SURGERY        NO SURGICAL HISTORY[                Family history:  Family History         Family History   Problem Relation Age of Onset    Other - See Comments Mother      Diabetes Maternal Grandfather      Heart Disease Paternal Grandfather           MI    Diabetes Other      Colon Cancer No family hx of              Medications:  Current Outpatient Prescriptions          Current Outpatient Medications   Medication Sig Dispense Refill     Acetaminophen (TYLENOL PO) Take 1,000 mg by mouth every 6 hours as needed         albuterol (PROAIR HFA/PROVENTIL HFA/VENTOLIN HFA) 108 (90 Base) MCG/ACT inhaler Inhale 2 puffs into the lungs every 6 hours as needed for shortness of breath / dyspnea or wheezing 18 g 1    ALPRAZolam (XANAX) 0.5 MG tablet TAKE 1 TABLET BY MOUTH 2 TIMES A DAY AS NEEDED FOR ANXIETY 20 tablet 0    benzonatate (TESSALON) 100 MG capsule Take 2 capsules (200 mg) by mouth 3 times daily as needed for cough 30 capsule 0    budesonide (ENTOCORT EC) 3 MG EC capsule Take 2 capsules (6 mg) by mouth every morning 60 capsule 1    cetirizine (ZYRTEC) 10 MG tablet Take 10 mg by mouth daily        fluticasone (FLONASE) 50 MCG/ACT nasal spray SPRAY 1 TO 2 SPRAYS INTO BOTH NOSTRILS DAILY 16 g 3    guaiFENesin-codeine (ROBITUSSIN AC) 100-10 MG/5ML solution Take 5-10 mLs by mouth every 4 hours as needed for cough (Do not take Lortab with this) 236 mL 0    HYDROcodone-acetaminophen (NORCO) 5-325 MG tablet TAKE 1 TABLET BY MOUTH 2 TIMES A DAY AS NEEDED 40 tablet 0    ondansetron (ZOFRAN-ODT) 4 MG ODT tab DISSOLVE 1 TABLET IN MOUTH EVERY 6 HOURS AS NEEDED FOR NAUSEA 20 tablet 0    spacer (OPTICHAMBER COREY) holding chamber Use with inhaler 1 each 0         Allergies:  The patientis allergic to clindamycin phosphate.  .  Social history:  Social History            Tobacco Use    Smoking status: Former       Packs/day: 0.00       Years: 33.00       Additional pack years: 0.00       Total pack years: 0.00       Types: Cigarettes       Start date: 1/1/1987    Smokeless tobacco: Never   Substance Use Topics    Alcohol use: Yes       Comment: occasionally      Marital status: .  Occupation: RN.     Review of Systems:  10 point review of systems was obtained and negative other than what previously mentioned in HPI     Physical Examination:  /70 (BP Location: Left arm, Cuff Size: Adult Large)   Pulse 69   Temp 96.9  F (36.1  C) (Tympanic)   Ht 1.829  m (6')   Wt 93 kg (205 lb)   SpO2 98%   BMI 27.80 kg/m    General: AAOx4, NAD, WN/WD, ambulating without assistance  HEENT:NCAT, EOMI, PERRL Sclerae anicteric; Trachea mideline,   Chest:  No acute distress, CTAB   Cardiac:  regular rate and rhythm, no murmur   Abdomen: non-distended   Extremities: Cursory exam unremarkable.  Skin: Warm, dry,   Neuro: no focal deficit,   Psych: happy, calm, asks appropriate questions        Assessment/Plan:  48 y.o. male with history of inflammation of terminal ileum resulting in obstruction in the past. He is due for repeat surveillance of his colon and would like to have it here instead of seeing his GI.    Satisfactory candidate for colonoscopy.  The indications, risks, benefits and technical aspects of whole colon colonoscopy were outlined with risks including, but not limited to, perforation, bleeding and inability to visualize entire colon.  Management of each was reviewed.  The need of mechanical preparation of the colon was reviewed along with the use of monitored anesthetic care.  The patient's questions were asked and answered.       Manuelito Shahid MD

## 2024-02-11 LAB
PATH REPORT.COMMENTS IMP SPEC: NORMAL
PATH REPORT.FINAL DX SPEC: NORMAL
PATH REPORT.GROSS SPEC: NORMAL
PATH REPORT.MICROSCOPIC SPEC OTHER STN: NORMAL
PATH REPORT.RELEVANT HX SPEC: NORMAL
PHOTO IMAGE: NORMAL

## 2024-03-28 NOTE — TELEPHONE ENCOUNTER
ALPRAZolam (XANAX) 0.5 MG tablet  Last Written Prescription Date:  2/13/19  Last Fill Quantity: 20,   # refills: 0  Last Office Visit: 11/20/18  Future Office visit:       Routing refill request to provider for review/approval because:  Drug not on the [facility], [facility] or [facility] refill protocol or controlled substance  
Cardiac catherization with possible intervention

## 2024-04-28 ENCOUNTER — HEALTH MAINTENANCE LETTER (OUTPATIENT)
Age: 49
End: 2024-04-28

## 2024-07-15 DIAGNOSIS — F41.9 ANXIETY: ICD-10-CM

## 2024-07-15 DIAGNOSIS — N50.89 GENITAL LESION, MALE: ICD-10-CM

## 2024-07-16 RX ORDER — ALPRAZOLAM 0.5 MG
TABLET ORAL
Qty: 20 TABLET | Refills: 0 | Status: SHIPPED | OUTPATIENT
Start: 2024-07-16 | End: 2024-09-05

## 2024-07-16 RX ORDER — VALACYCLOVIR HYDROCHLORIDE 1 G/1
TABLET, FILM COATED ORAL
Qty: 21 TABLET | Refills: 0 | Status: SHIPPED | OUTPATIENT
Start: 2024-07-16 | End: 2024-09-06

## 2024-09-04 DIAGNOSIS — F41.9 ANXIETY: ICD-10-CM

## 2024-09-05 RX ORDER — ALPRAZOLAM 0.5 MG
TABLET ORAL
Qty: 20 TABLET | Refills: 0 | Status: SHIPPED | OUTPATIENT
Start: 2024-09-05

## 2024-10-31 ENCOUNTER — TELEPHONE (OUTPATIENT)
Dept: FAMILY MEDICINE | Facility: OTHER | Age: 49
End: 2024-10-31

## 2024-10-31 DIAGNOSIS — J20.9 ACUTE BRONCHITIS, UNSPECIFIED ORGANISM: Primary | ICD-10-CM

## 2024-10-31 RX ORDER — AZITHROMYCIN 250 MG/1
TABLET, FILM COATED ORAL
Qty: 6 TABLET | Refills: 0 | Status: SHIPPED | OUTPATIENT
Start: 2024-10-31 | End: 2024-11-05

## 2024-10-31 RX ORDER — PREDNISONE 20 MG/1
40 TABLET ORAL DAILY
Qty: 10 TABLET | Refills: 0 | Status: SHIPPED | OUTPATIENT
Start: 2024-10-31 | End: 2024-11-05

## 2024-12-18 ENCOUNTER — OFFICE VISIT (OUTPATIENT)
Dept: FAMILY MEDICINE | Facility: OTHER | Age: 49
End: 2024-12-18
Attending: FAMILY MEDICINE
Payer: COMMERCIAL

## 2024-12-18 VITALS
WEIGHT: 197 LBS | OXYGEN SATURATION: 97 % | HEART RATE: 74 BPM | HEIGHT: 72 IN | TEMPERATURE: 96.9 F | DIASTOLIC BLOOD PRESSURE: 66 MMHG | SYSTOLIC BLOOD PRESSURE: 114 MMHG | BODY MASS INDEX: 26.68 KG/M2

## 2024-12-18 DIAGNOSIS — M25.512 CHRONIC PAIN OF BOTH SHOULDERS: ICD-10-CM

## 2024-12-18 DIAGNOSIS — N50.89 GENITAL LESION, MALE: ICD-10-CM

## 2024-12-18 DIAGNOSIS — M54.2 NECK PAIN: ICD-10-CM

## 2024-12-18 DIAGNOSIS — Z00.00 ROUTINE GENERAL MEDICAL EXAMINATION AT A HEALTH CARE FACILITY: Primary | ICD-10-CM

## 2024-12-18 DIAGNOSIS — G89.29 CHRONIC PAIN OF BOTH SHOULDERS: ICD-10-CM

## 2024-12-18 DIAGNOSIS — F41.9 ANXIETY: ICD-10-CM

## 2024-12-18 DIAGNOSIS — M25.511 CHRONIC PAIN OF BOTH SHOULDERS: ICD-10-CM

## 2024-12-18 PROBLEM — F11.90 CHRONIC, CONTINUOUS USE OF OPIOIDS: Chronic | Status: RESOLVED | Noted: 2019-09-23 | Resolved: 2024-12-18

## 2024-12-18 RX ORDER — ALPRAZOLAM 0.5 MG
1 TABLET ORAL
Qty: 20 TABLET | Refills: 0 | Status: SHIPPED | OUTPATIENT
Start: 2024-12-18

## 2024-12-18 RX ORDER — VALACYCLOVIR HYDROCHLORIDE 1 G/1
1000 TABLET, FILM COATED ORAL 3 TIMES DAILY
Qty: 21 TABLET | Refills: 0 | Status: SHIPPED | OUTPATIENT
Start: 2024-12-18

## 2024-12-18 RX ORDER — HYDROCODONE BITARTRATE AND ACETAMINOPHEN 5; 325 MG/1; MG/1
1 TABLET ORAL EVERY 6 HOURS PRN
Qty: 18 TABLET | Refills: 0 | Status: SHIPPED | OUTPATIENT
Start: 2024-12-18 | End: 2024-12-18

## 2024-12-18 RX ORDER — HYDROCODONE BITARTRATE AND ACETAMINOPHEN 5; 325 MG/1; MG/1
1 TABLET ORAL 2 TIMES DAILY PRN
Qty: 18 TABLET | Refills: 0 | Status: SHIPPED | OUTPATIENT
Start: 2024-12-18

## 2024-12-18 SDOH — HEALTH STABILITY: PHYSICAL HEALTH: ON AVERAGE, HOW MANY DAYS PER WEEK DO YOU ENGAGE IN MODERATE TO STRENUOUS EXERCISE (LIKE A BRISK WALK)?: 7 DAYS

## 2024-12-18 SDOH — HEALTH STABILITY: PHYSICAL HEALTH: ON AVERAGE, HOW MANY MINUTES DO YOU ENGAGE IN EXERCISE AT THIS LEVEL?: 30 MIN

## 2024-12-18 ASSESSMENT — ANXIETY QUESTIONNAIRES
GAD7 TOTAL SCORE: 5
GAD7 TOTAL SCORE: 5
1. FEELING NERVOUS, ANXIOUS, OR ON EDGE: SEVERAL DAYS
4. TROUBLE RELAXING: SEVERAL DAYS
6. BECOMING EASILY ANNOYED OR IRRITABLE: SEVERAL DAYS
5. BEING SO RESTLESS THAT IT IS HARD TO SIT STILL: NOT AT ALL
3. WORRYING TOO MUCH ABOUT DIFFERENT THINGS: SEVERAL DAYS
7. FEELING AFRAID AS IF SOMETHING AWFUL MIGHT HAPPEN: NOT AT ALL
IF YOU CHECKED OFF ANY PROBLEMS ON THIS QUESTIONNAIRE, HOW DIFFICULT HAVE THESE PROBLEMS MADE IT FOR YOU TO DO YOUR WORK, TAKE CARE OF THINGS AT HOME, OR GET ALONG WITH OTHER PEOPLE: NOT DIFFICULT AT ALL
7. FEELING AFRAID AS IF SOMETHING AWFUL MIGHT HAPPEN: NOT AT ALL
2. NOT BEING ABLE TO STOP OR CONTROL WORRYING: SEVERAL DAYS
GAD7 TOTAL SCORE: 5
8. IF YOU CHECKED OFF ANY PROBLEMS, HOW DIFFICULT HAVE THESE MADE IT FOR YOU TO DO YOUR WORK, TAKE CARE OF THINGS AT HOME, OR GET ALONG WITH OTHER PEOPLE?: NOT DIFFICULT AT ALL

## 2024-12-18 ASSESSMENT — PAIN SCALES - PAIN ENJOYMENT GENERAL ACTIVITY SCALE (PEG)
INTERFERED_ENJOYMENT_LIFE: 0 - DOES NOT INTERFERE
INTERFERED_ENJOYMENT_LIFE: 0
AVG_PAIN_PASTWEEK: 3
INTERFERED_GENERAL_ACTIVITY: 0
AVG_PAIN_PASTWEEK: 3
PEG_TOTALSCORE: 1
PEG_TOTALSCORE: 1
INTERFERED_GENERAL_ACTIVITY: 0 - DOES NOT INTERFERE

## 2024-12-18 ASSESSMENT — SOCIAL DETERMINANTS OF HEALTH (SDOH): HOW OFTEN DO YOU GET TOGETHER WITH FRIENDS OR RELATIVES?: ONCE A WEEK

## 2024-12-18 ASSESSMENT — PAIN SCALES - GENERAL: PAINLEVEL_OUTOF10: NO PAIN (0)

## 2024-12-18 NOTE — PROGRESS NOTES
"Preventive Care Visit  [facility]  Andrei Philip MD, Family Medicine  Dec 18, 2024      Assessment & Plan     Routine general medical examination at a health care facility  Doing well. Routine cares and follow.      Chronic pain of both shoulders  Stable.    Neck pain  Intermittent.  Meds on hand.  He never uses with the xanax which he uses minimally as well.  Basically this on hand for use but minimize.   - HYDROcodone-acetaminophen (NORCO) 5-325 MG tablet; Take 1 tablet by mouth every 6 hours as needed for severe pain. TAKE 1 TABLET BY MOUTH 2 TIMES A DAY AS NEEDED    Anxiety  As above.  Minimal PRN xanax.    - ALPRAZolam (XANAX) 0.5 MG tablet; Take 2 tablets (1 mg) by mouth nightly as needed for anxiety.    Genital lesion, male  Update on hand.  Sent.    - valACYclovir (VALTREX) 1000 mg tablet; Take 1 tablet (1,000 mg) by mouth 3 times daily.            BMI  Estimated body mass index is 27.09 kg/m  as calculated from the following:    Height as of this encounter: 1.816 m (5' 11.5\").    Weight as of this encounter: 89.4 kg (197 lb).       Counseling  Appropriate preventive services were addressed with this patient via screening, questionnaire, or discussion as appropriate for fall prevention, nutrition, physical activity, Tobacco-use cessation, social engagement, weight loss and cognition.  Checklist reviewing preventive services available has been given to the patient.  Reviewed patient's diet, addressing concerns and/or questions.   The patient reports drinking more than 3 alcoholic drinks per day and/or more than 7 drhnks per week. The patient was counseled and given information about possible harmful effects of excessive alcohol intake.        No follow-ups on file.    Yahaira Sawant is a 49 year old, presenting for the following:  Physical        12/18/2024     8:56 AM   Additional Questions   Roomed by Soo BHAKTA        Health Care Directive  Patient does not have a Health Care Directive: " Discussed advance care planning with patient; however, patient declined at this time.      12/18/2024   General Health   How would you rate your overall physical health? Good   Feel stress (tense, anxious, or unable to sleep) Only a little      (!) STRESS CONCERN      12/18/2024   Nutrition   Three or more servings of calcium each day? Yes   Diet: Regular (no restrictions)   How many servings of fruit and vegetables per day? (!) 2-3   How many sweetened beverages each day? 0-1            12/18/2024   Exercise   Days per week of moderate/strenous exercise 7 days   Average minutes spent exercising at this level 30 min            12/18/2024   Social Factors   Frequency of gathering with friends or relatives Once a week   Worry food won't last until get money to buy more No   Food not last or not have enough money for food? No   Do you have housing? (Housing is defined as stable permanent housing and does not include staying ouside in a car, in a tent, in an abandoned building, in an overnight shelter, or couch-surfing.) No   Are you worried about losing your housing? No   Lack of transportation? No   Unable to get utilities (heat,electricity)? No   Want help with housing or utility concern? No      (!) HOUSING CONCERN PRESENT      12/18/2024   Fall Risk   Fallen 2 or more times in the past year? No    Trouble with walking or balance? No        Patient-reported          12/18/2024   Dental   Dentist two times every year? Yes            12/18/2024   TB Screening   Were you born outside of the [place]? No            Today's PHQ-2 Score:       12/18/2024     9:18 AM   PHQ-2 ( 1999 [employer])   Q1: Little interest or pleasure in doing things 0    Q2: Feeling down, depressed or hopeless 0    PHQ-2 Score 0    Q1: Little interest or pleasure in doing things Not at all   Q2: Feeling down, depressed or hopeless Not at all   PHQ-2 Score 0       Patient-reported           12/18/2024   Substance Use   Alcohol more than 3/day or more than  7/wk Yes   How often do you have a drink containing alcohol 2 to 3 times a week   How many alcohol drinks on typical day 3 or 4   How often do you have 5+ drinks at one occasion Monthly   Audit 2/3 Score 3   How often not able to stop drinking once started Never   How often failed to do what normally expected Less than monthly   How often needed first drink in am after a heavy drinking session Never   How often feeling of guilt or remorse after drinking Less than monthly   How often unable to remember what happened the night before Never   Have you or someone else been injured because of your drinking No   Has anyone been concerned or suggested you cut down on drinking No   TOTAL SCORE - AUDIT 8   Do you use any other substances recreationally? No        Social History     Tobacco Use    Smoking status: Former     Types: Cigarettes     Start date: 1/1/1987    Smokeless tobacco: Never   Substance Use Topics    Alcohol use: Yes     Comment: occasionally    Drug use: No           12/18/2024   STI Screening   New sexual partner(s) since last STI/HIV test? (!) YES       ASCVD Risk   The 10-year ASCVD risk score (Alirio TERRY, et al., 2019) is: 2.1%    Values used to calculate the score:      Age: 49 years      Sex: Male      Is Non- : No      Diabetic: No      Tobacco smoker: No      Systolic Blood Pressure: 114 mmHg      Is BP treated: No      HDL Cholesterol: 62 mg/dL      Total Cholesterol: 178 mg/dL           Reviewed and updated as needed this visit by Provider                    Past Medical History:   Diagnosis Date    Chronic, continuous use of opioids 09/23/2019    History of steroid therapy     Stomach problems      Past Surgical History:   Procedure Laterality Date    COLONOSCOPY N/A 6/18/2019    Procedure: COLONOSCOPY, WITH POLYPECTOMY AND BIOPSY;  Surgeon: Urszula Vides MD;  [facility]    COLONOSCOPY N/A 3/9/2020    Procedure: COLONOSCOPY, WITH POLYPECTOMY AND BIOPSY;   "Surgeon: Urszula Vides MD;  Location[facility]    COLONOSCOPY N/A 2/6/2024    Procedure: COLONOSCOPY with biopsies and polypectomy;  Surgeon: Manuelito Shahid MD;  Location[facility]    ESOPHAGOSCOPY, GASTROSCOPY, DUODENOSCOPY (EGD), COMBINED N/A 6/18/2019    Procedure: ESOPHAGOGASTRODUODENOSCOPY, WITH BIOPSY;  Surgeon: Urszula Vides MD;  Location: [facility]    GENITOURINARY SURGERY      NO SURGICAL HISTORY[           Review of Systems  CONSTITUTIONAL: NEGATIVE for fever, chills, change in weight  INTEGUMENTARY/SKIN: NEGATIVE for worrisome rashes, moles or lesions  EYES: NEGATIVE for vision changes or irritation  ENT/MOUTH: NEGATIVE for ear, mouth and throat problems  RESP: NEGATIVE for significant cough or SOB  BREAST: NEGATIVE for masses, tenderness or discharge  CV: NEGATIVE for chest pain, palpitations or peripheral edema  GI: NEGATIVE for nausea, abdominal pain, heartburn, or change in bowel habits  : NEGATIVE for frequency, dysuria, or hematuria  MUSCULOSKELETAL: NEGATIVE for significant arthralgias or myalgia  NEURO: NEGATIVE for weakness, dizziness or paresthesias  ENDOCRINE: NEGATIVE for temperature intolerance, skin/hair changes  HEME: NEGATIVE for bleeding problems  PSYCHIATRIC: NEGATIVE for changes in mood or affect     Objective    Exam  /66   Pulse 74   Temp 96.9  F (36.1  C) (Tympanic)   Ht 1.816 m (5' 11.5\")   Wt 89.4 kg (197 lb)   SpO2 97%   BMI 27.09 kg/m     Estimated body mass index is 27.09 kg/m  as calculated from the following:    Height as of this encounter: 1.816 m (5' 11.5\").    Weight as of this encounter: 89.4 kg (197 lb).    Physical Exam  GENERAL: alert and no distress  EYES: Eyes grossly normal to inspection, PERRL and conjunctivae and sclerae normal  HENT: ear canals and TM's normal, nose and mouth without ulcers or lesions  NECK: no adenopathy, no asymmetry, masses, or scars  RESP: lungs clear to auscultation - no rales, rhonchi or wheezes  CV: regular rate and rhythm, " normal S1 S2, no S3 or S4, no murmur, click or rub, no peripheral edema  ABDOMEN: soft, nontender, no hepatosplenomegaly, no masses and bowel sounds normal  MS: no gross musculoskeletal defects noted, no edema  SKIN: no suspicious lesions or rashes  NEURO: Normal strength and tone, mentation intact and speech normal  PSYCH: mentation appears normal, affect normal/bright    Full labs reviewed.  Refills sent.      Signed Electronically by: Andrei Philip MD

## 2025-02-10 DIAGNOSIS — F41.9 ANXIETY: Primary | ICD-10-CM

## 2025-02-10 RX ORDER — ESCITALOPRAM OXALATE 20 MG/1
20 TABLET ORAL DAILY
Qty: 90 TABLET | Refills: 3 | Status: SHIPPED | OUTPATIENT
Start: 2025-02-10

## 2025-02-25 DIAGNOSIS — F41.9 ANXIETY: ICD-10-CM

## 2025-02-25 RX ORDER — ALPRAZOLAM 0.5 MG
TABLET ORAL
Qty: 20 TABLET | Refills: 0 | Status: SHIPPED | OUTPATIENT
Start: 2025-02-25

## 2025-03-05 DIAGNOSIS — J98.01 BRONCHOSPASM: ICD-10-CM

## 2025-03-05 RX ORDER — FLUTICASONE PROPIONATE 50 MCG
SPRAY, SUSPENSION (ML) NASAL
Qty: 16 G | Refills: 0 | Status: SHIPPED | OUTPATIENT
Start: 2025-03-05

## 2025-03-05 NOTE — TELEPHONE ENCOUNTER
Flonase  Last Written Prescription Date: 10/12/23  Last Fill Quantity: 16 g # of Refills: 3  Last Office Visit: 12/1824

## 2025-03-05 NOTE — TELEPHONE ENCOUNTER
Routing refill request to provider for review/approval because:  Nasal Allergy Protocol [id number]/05/2025 11:37 AM   Protocol Details   Medication indicated for associated diagnosis

## 2025-05-14 DIAGNOSIS — N50.89 GENITAL LESION, MALE: ICD-10-CM

## 2025-05-14 RX ORDER — VALACYCLOVIR HYDROCHLORIDE 1 G/1
1000 TABLET, FILM COATED ORAL 3 TIMES DAILY
Qty: 21 TABLET | Refills: 0 | Status: SHIPPED | OUTPATIENT
Start: 2025-05-14

## 2025-05-14 NOTE — TELEPHONE ENCOUNTER
VALACYCLOVIR HCL 1 GRAM TABLET         Last Written Prescription Date:  12/18/24  Last Fill Quantity: 21,   # refills: 0  Last Office Visit: 12/18/24  Future Office visit:       Routing refill request to provider for review/approval because:  Antivirals [id number]/2025 07:55 AM   Protocol Details Medication indicated for associated diagnosis

## (undated) DEVICE — SOL WATER IRRIG 1000ML BOTTLE 2F7114

## (undated) DEVICE — ENDO FORCEP ENDOJAW BIOPSY 2.8MMX230CM FB-220U

## (undated) DEVICE — TUBING SUCTION 12"X1/4" N612

## (undated) DEVICE — SUCTION MANIFOLD NEPTUNE 2 SYS 1 PORT 702-025-000

## (undated) DEVICE — ENDO TRAP POLYP E-TRAP 00711099

## (undated) DEVICE — SYR 30ML SLIP TIP W/O NDL 302833

## (undated) DEVICE — CONNECTOR ERBEFLO 2 PORT 20325-215

## (undated) DEVICE — TUBING SUCTION 20FT N620A

## (undated) DEVICE — ENDO SNARE EXACTO COLD 9MM LOOP 2.4MMX230CM 00711115

## (undated) DEVICE — KIT ENDO TURNOVER/PROCEDURE CARRY-ON 101822

## (undated) DEVICE — FORCEPS BIOPSY RADIAL JAW 4 LARGE W/NEEDLE 240CM M00513332

## (undated) DEVICE — SUCTION CATH AIRLIFE TRI-FLO W/CONTROL PORT 14FR  T60C

## (undated) DEVICE — GOWN IMPERVIOUS 2XL BLUE

## (undated) DEVICE — SPECIMEN CONTAINER 3OZ W/FORMALIN 59901

## (undated) DEVICE — ENDO FORCEP BX CAPTURA PRO SPIKE G50696

## (undated) DEVICE — Device

## (undated) DEVICE — ENDO BITE BLOCK ADULT OMNI-BLOC

## (undated) DEVICE — GLOVE EXAM NITRILE LG PF LATEX FREE 5064

## (undated) RX ORDER — METHYLPREDNISOLONE ACETATE 80 MG/ML
INJECTION, SUSPENSION INTRA-ARTICULAR; INTRALESIONAL; INTRAMUSCULAR; SOFT TISSUE
Status: DISPENSED
Start: 2020-03-04

## (undated) RX ORDER — DIPHENHYDRAMINE HYDROCHLORIDE 50 MG/ML
INJECTION INTRAMUSCULAR; INTRAVENOUS
Status: DISPENSED
Start: 2019-06-18

## (undated) RX ORDER — PROPOFOL 10 MG/ML
INJECTION, EMULSION INTRAVENOUS
Status: DISPENSED
Start: 2024-02-06

## (undated) RX ORDER — FENTANYL CITRATE 50 UG/ML
INJECTION, SOLUTION INTRAMUSCULAR; INTRAVENOUS
Status: DISPENSED
Start: 2019-06-18

## (undated) RX ORDER — LIDOCAINE HYDROCHLORIDE 10 MG/ML
INJECTION, SOLUTION INFILTRATION; PERINEURAL
Status: DISPENSED
Start: 2020-03-04